# Patient Record
Sex: MALE | Race: BLACK OR AFRICAN AMERICAN
[De-identification: names, ages, dates, MRNs, and addresses within clinical notes are randomized per-mention and may not be internally consistent; named-entity substitution may affect disease eponyms.]

---

## 2018-08-03 ENCOUNTER — HOSPITAL ENCOUNTER (INPATIENT)
Dept: HOSPITAL 84 - D.M2 | Age: 68
LOS: 4 days | Discharge: HOME | DRG: 246 | End: 2018-08-07
Attending: INTERNAL MEDICINE | Admitting: INTERNAL MEDICINE
Payer: MEDICARE

## 2018-08-03 VITALS
BODY MASS INDEX: 23.27 KG/M2 | WEIGHT: 162.54 LBS | WEIGHT: 162.54 LBS | HEIGHT: 70 IN | BODY MASS INDEX: 23.27 KG/M2 | HEIGHT: 70 IN

## 2018-08-03 VITALS — SYSTOLIC BLOOD PRESSURE: 176 MMHG | DIASTOLIC BLOOD PRESSURE: 92 MMHG

## 2018-08-03 VITALS — DIASTOLIC BLOOD PRESSURE: 68 MMHG | SYSTOLIC BLOOD PRESSURE: 118 MMHG

## 2018-08-03 VITALS — SYSTOLIC BLOOD PRESSURE: 126 MMHG | DIASTOLIC BLOOD PRESSURE: 58 MMHG

## 2018-08-03 VITALS — SYSTOLIC BLOOD PRESSURE: 166 MMHG | DIASTOLIC BLOOD PRESSURE: 84 MMHG

## 2018-08-03 DIAGNOSIS — N18.6: ICD-10-CM

## 2018-08-03 DIAGNOSIS — E87.5: ICD-10-CM

## 2018-08-03 DIAGNOSIS — I12.0: ICD-10-CM

## 2018-08-03 DIAGNOSIS — E78.5: ICD-10-CM

## 2018-08-03 DIAGNOSIS — Z99.2: ICD-10-CM

## 2018-08-03 DIAGNOSIS — I25.119: Primary | ICD-10-CM

## 2018-08-03 DIAGNOSIS — I70.213: ICD-10-CM

## 2018-08-03 DIAGNOSIS — Z87.891: ICD-10-CM

## 2018-08-03 DIAGNOSIS — Z95.1: ICD-10-CM

## 2018-08-03 DIAGNOSIS — I70.92: ICD-10-CM

## 2018-08-03 LAB
ALBUMIN SERPL-MCNC: 3.2 G/DL (ref 3.4–5)
ALP SERPL-CCNC: 89 U/L (ref 46–116)
ALT SERPL-CCNC: 33 U/L (ref 10–68)
ANION GAP SERPL CALC-SCNC: 16.1 MMOL/L (ref 8–16)
APTT BLD: 28 SECONDS (ref 22.8–39.4)
BASOPHILS NFR BLD AUTO: 0.4 % (ref 0–2)
BILIRUB SERPL-MCNC: 0.36 MG/DL (ref 0.2–1.3)
BUN SERPL-MCNC: 72 MG/DL (ref 7–18)
CALCIUM SERPL-MCNC: 8.3 MG/DL (ref 8.5–10.1)
CHLORIDE SERPL-SCNC: 100 MMOL/L (ref 98–107)
CHOLEST/HDLC SERPL: 2.2 RATIO (ref 2.3–4.9)
CK MB SERPL-MCNC: 1 U/L (ref 0–3.6)
CK MB SERPL-MCNC: 1 U/L (ref 0–3.6)
CK MB SERPL-MCNC: 1.1 U/L (ref 0–3.6)
CK SERPL-CCNC: 41 UL (ref 21–232)
CK SERPL-CCNC: 52 UL (ref 21–232)
CK SERPL-CCNC: 53 UL (ref 21–232)
CO2 SERPL-SCNC: 27.6 MMOL/L (ref 21–32)
CREAT SERPL-MCNC: 11 MG/DL (ref 0.6–1.3)
EOSINOPHIL NFR BLD: 2.2 % (ref 0–7)
ERYTHROCYTE [DISTWIDTH] IN BLOOD BY AUTOMATED COUNT: 16.4 % (ref 11.5–14.5)
GLOBULIN SER-MCNC: 3.7 G/L
GLUCOSE SERPL-MCNC: 75 MG/DL (ref 74–106)
HCT VFR BLD CALC: 34.8 % (ref 42–54)
HDLC SERPL-MCNC: 58 MG/DL (ref 32–96)
HGB BLD-MCNC: 11.1 G/DL (ref 13.5–17.5)
IMM GRANULOCYTES NFR BLD: 0.7 % (ref 0–5)
INR PPP: 1.19 (ref 0.85–1.17)
LDL-HDL RATIO: 1.1 RATIO (ref 1.5–3.5)
LDLC SERPL-MCNC: 64 MG/DL (ref 0–100)
LYMPHOCYTES NFR BLD AUTO: 24.3 % (ref 15–50)
MAGNESIUM SERPL-MCNC: 1.8 MG/DL (ref 1.8–2.4)
MCH RBC QN AUTO: 29.5 PG (ref 26–34)
MCHC RBC AUTO-ENTMCNC: 31.9 G/DL (ref 31–37)
MCV RBC: 92.6 FL (ref 80–100)
MONOCYTES NFR BLD: 10.2 % (ref 2–11)
NEUTROPHILS NFR BLD AUTO: 62.2 % (ref 40–80)
NT-PROBNP SERPL-MCNC: 4040 PG/ML (ref 0–125)
OSMOLALITY SERPL CALC.SUM OF ELEC: 295 MOSM/KG (ref 275–300)
PLATELET # BLD: 102 10X3/UL (ref 130–400)
PMV BLD AUTO: 11.2 FL (ref 7.4–10.4)
POTASSIUM SERPL-SCNC: 5.7 MMOL/L (ref 3.5–5.1)
PROT SERPL-MCNC: 6.9 G/DL (ref 6.4–8.2)
PROTHROMBIN TIME: 14.7 SECONDS (ref 11.6–15)
RBC # BLD AUTO: 3.76 10X6/UL (ref 4.2–6.1)
SODIUM SERPL-SCNC: 138 MMOL/L (ref 136–145)
TRIGL SERPL-MCNC: 40 MG/DL (ref 30–200)
TROPONIN I SERPL-MCNC: 0.04 NG/ML (ref 0–0.06)
TROPONIN I SERPL-MCNC: 0.05 NG/ML (ref 0–0.06)
TROPONIN I SERPL-MCNC: 0.07 NG/ML (ref 0–0.06)
WBC # BLD AUTO: 5.4 10X3/UL (ref 4.8–10.8)

## 2018-08-03 PROCEDURE — 5A1D70Z PERFORMANCE OF URINARY FILTRATION, INTERMITTENT, LESS THAN 6 HOURS PER DAY: ICD-10-PCS | Performed by: INTERNAL MEDICINE

## 2018-08-04 VITALS — SYSTOLIC BLOOD PRESSURE: 91 MMHG | DIASTOLIC BLOOD PRESSURE: 45 MMHG

## 2018-08-04 VITALS — DIASTOLIC BLOOD PRESSURE: 60 MMHG | SYSTOLIC BLOOD PRESSURE: 111 MMHG

## 2018-08-04 VITALS — SYSTOLIC BLOOD PRESSURE: 85 MMHG | DIASTOLIC BLOOD PRESSURE: 44 MMHG

## 2018-08-04 VITALS — DIASTOLIC BLOOD PRESSURE: 75 MMHG | SYSTOLIC BLOOD PRESSURE: 122 MMHG

## 2018-08-04 VITALS — DIASTOLIC BLOOD PRESSURE: 60 MMHG | SYSTOLIC BLOOD PRESSURE: 112 MMHG

## 2018-08-04 VITALS — SYSTOLIC BLOOD PRESSURE: 116 MMHG | DIASTOLIC BLOOD PRESSURE: 58 MMHG

## 2018-08-04 LAB
ANION GAP SERPL CALC-SCNC: 14.4 MMOL/L (ref 8–16)
BASOPHILS NFR BLD AUTO: 0.2 % (ref 0–2)
BUN SERPL-MCNC: 59 MG/DL (ref 7–18)
CALCIUM SERPL-MCNC: 7.9 MG/DL (ref 8.5–10.1)
CHLORIDE SERPL-SCNC: 100 MMOL/L (ref 98–107)
CO2 SERPL-SCNC: 27.7 MMOL/L (ref 21–32)
CREAT SERPL-MCNC: 9.5 MG/DL (ref 0.6–1.3)
EOSINOPHIL NFR BLD: 2.8 % (ref 0–7)
ERYTHROCYTE [DISTWIDTH] IN BLOOD BY AUTOMATED COUNT: 16.5 % (ref 11.5–14.5)
GLUCOSE SERPL-MCNC: 114 MG/DL (ref 74–106)
HCT VFR BLD CALC: 28.9 % (ref 42–54)
HGB BLD-MCNC: 9.2 G/DL (ref 13.5–17.5)
IMM GRANULOCYTES NFR BLD: 0.4 % (ref 0–5)
LYMPHOCYTES NFR BLD AUTO: 25.3 % (ref 15–50)
MCH RBC QN AUTO: 29 PG (ref 26–34)
MCHC RBC AUTO-ENTMCNC: 31.8 G/DL (ref 31–37)
MCV RBC: 91.2 FL (ref 80–100)
MONOCYTES NFR BLD: 12.3 % (ref 2–11)
NEUTROPHILS NFR BLD AUTO: 59 % (ref 40–80)
OSMOLALITY SERPL CALC.SUM OF ELEC: 291 MOSM/KG (ref 275–300)
PLATELET # BLD: 101 10X3/UL (ref 130–400)
PMV BLD AUTO: 11 FL (ref 7.4–10.4)
POTASSIUM SERPL-SCNC: 5.1 MMOL/L (ref 3.5–5.1)
RBC # BLD AUTO: 3.17 10X6/UL (ref 4.2–6.1)
SODIUM SERPL-SCNC: 137 MMOL/L (ref 136–145)
WBC # BLD AUTO: 5.4 10X3/UL (ref 4.8–10.8)

## 2018-08-05 VITALS — SYSTOLIC BLOOD PRESSURE: 97 MMHG | DIASTOLIC BLOOD PRESSURE: 53 MMHG

## 2018-08-05 VITALS — DIASTOLIC BLOOD PRESSURE: 57 MMHG | SYSTOLIC BLOOD PRESSURE: 99 MMHG

## 2018-08-05 VITALS — SYSTOLIC BLOOD PRESSURE: 139 MMHG | DIASTOLIC BLOOD PRESSURE: 69 MMHG

## 2018-08-05 VITALS — SYSTOLIC BLOOD PRESSURE: 104 MMHG | DIASTOLIC BLOOD PRESSURE: 57 MMHG

## 2018-08-05 VITALS — SYSTOLIC BLOOD PRESSURE: 94 MMHG | DIASTOLIC BLOOD PRESSURE: 56 MMHG

## 2018-08-05 VITALS — SYSTOLIC BLOOD PRESSURE: 95 MMHG | DIASTOLIC BLOOD PRESSURE: 49 MMHG

## 2018-08-05 LAB
ANION GAP SERPL CALC-SCNC: 16.3 MMOL/L (ref 8–16)
BASOPHILS NFR BLD AUTO: 0.2 % (ref 0–2)
BUN SERPL-MCNC: 77 MG/DL (ref 7–18)
CALCIUM SERPL-MCNC: 7.6 MG/DL (ref 8.5–10.1)
CHLORIDE SERPL-SCNC: 98 MMOL/L (ref 98–107)
CO2 SERPL-SCNC: 26.1 MMOL/L (ref 21–32)
CREAT SERPL-MCNC: 11.5 MG/DL (ref 0.6–1.3)
EOSINOPHIL NFR BLD: 2.2 % (ref 0–7)
ERYTHROCYTE [DISTWIDTH] IN BLOOD BY AUTOMATED COUNT: 16.7 % (ref 11.5–14.5)
GLUCOSE SERPL-MCNC: 107 MG/DL (ref 74–106)
HCT VFR BLD CALC: 27.9 % (ref 42–54)
HGB BLD-MCNC: 8.9 G/DL (ref 13.5–17.5)
IMM GRANULOCYTES NFR BLD: 0.4 % (ref 0–5)
LYMPHOCYTES NFR BLD AUTO: 19.2 % (ref 15–50)
MCH RBC QN AUTO: 28.9 PG (ref 26–34)
MCHC RBC AUTO-ENTMCNC: 31.9 G/DL (ref 31–37)
MCV RBC: 90.6 FL (ref 80–100)
MONOCYTES NFR BLD: 14.9 % (ref 2–11)
NEUTROPHILS NFR BLD AUTO: 63.1 % (ref 40–80)
OSMOLALITY SERPL CALC.SUM OF ELEC: 292 MOSM/KG (ref 275–300)
PLATELET # BLD: 114 10X3/UL (ref 130–400)
PMV BLD AUTO: 11.5 FL (ref 7.4–10.4)
POTASSIUM SERPL-SCNC: 5.4 MMOL/L (ref 3.5–5.1)
RBC # BLD AUTO: 3.08 10X6/UL (ref 4.2–6.1)
SODIUM SERPL-SCNC: 135 MMOL/L (ref 136–145)
WBC # BLD AUTO: 5.1 10X3/UL (ref 4.8–10.8)

## 2018-08-06 VITALS — DIASTOLIC BLOOD PRESSURE: 74 MMHG | SYSTOLIC BLOOD PRESSURE: 128 MMHG

## 2018-08-06 VITALS — DIASTOLIC BLOOD PRESSURE: 59 MMHG | SYSTOLIC BLOOD PRESSURE: 103 MMHG

## 2018-08-06 VITALS — DIASTOLIC BLOOD PRESSURE: 65 MMHG | SYSTOLIC BLOOD PRESSURE: 118 MMHG

## 2018-08-06 LAB
ANION GAP SERPL CALC-SCNC: 17.3 MMOL/L (ref 8–16)
BASOPHILS NFR BLD AUTO: 0.2 % (ref 0–2)
BUN SERPL-MCNC: 88 MG/DL (ref 7–18)
CALCIUM SERPL-MCNC: 7.6 MG/DL (ref 8.5–10.1)
CHLORIDE SERPL-SCNC: 100 MMOL/L (ref 98–107)
CO2 SERPL-SCNC: 26.2 MMOL/L (ref 21–32)
CREAT SERPL-MCNC: 13.5 MG/DL (ref 0.6–1.3)
EOSINOPHIL NFR BLD: 1.6 % (ref 0–7)
ERYTHROCYTE [DISTWIDTH] IN BLOOD BY AUTOMATED COUNT: 16.7 % (ref 11.5–14.5)
GLUCOSE SERPL-MCNC: 86 MG/DL (ref 74–106)
HCT VFR BLD CALC: 26.8 % (ref 42–54)
HGB BLD-MCNC: 8.6 G/DL (ref 13.5–17.5)
IMM GRANULOCYTES NFR BLD: 0.4 % (ref 0–5)
LYMPHOCYTES NFR BLD AUTO: 26.2 % (ref 15–50)
MCH RBC QN AUTO: 28.8 PG (ref 26–34)
MCHC RBC AUTO-ENTMCNC: 32.1 G/DL (ref 31–37)
MCV RBC: 89.6 FL (ref 80–100)
MONOCYTES NFR BLD: 14.8 % (ref 2–11)
NEUTROPHILS NFR BLD AUTO: 56.8 % (ref 40–80)
OSMOLALITY SERPL CALC.SUM OF ELEC: 301 MOSM/KG (ref 275–300)
PLATELET # BLD: 110 10X3/UL (ref 130–400)
PMV BLD AUTO: 12.1 FL (ref 7.4–10.4)
POTASSIUM SERPL-SCNC: 5.5 MMOL/L (ref 3.5–5.1)
RBC # BLD AUTO: 2.99 10X6/UL (ref 4.2–6.1)
SODIUM SERPL-SCNC: 138 MMOL/L (ref 136–145)
WBC # BLD AUTO: 5.2 10X3/UL (ref 4.8–10.8)

## 2018-08-06 PROCEDURE — B2151ZZ FLUOROSCOPY OF LEFT HEART USING LOW OSMOLAR CONTRAST: ICD-10-PCS | Performed by: INTERNAL MEDICINE

## 2018-08-06 PROCEDURE — B2111ZZ FLUOROSCOPY OF MULTIPLE CORONARY ARTERIES USING LOW OSMOLAR CONTRAST: ICD-10-PCS | Performed by: INTERNAL MEDICINE

## 2018-08-06 PROCEDURE — 4A023N7 MEASUREMENT OF CARDIAC SAMPLING AND PRESSURE, LEFT HEART, PERCUTANEOUS APPROACH: ICD-10-PCS | Performed by: INTERNAL MEDICINE

## 2018-08-06 PROCEDURE — B2121ZZ FLUOROSCOPY OF SINGLE CORONARY ARTERY BYPASS GRAFT USING LOW OSMOLAR CONTRAST: ICD-10-PCS | Performed by: INTERNAL MEDICINE

## 2018-08-06 PROCEDURE — B2181ZZ FLUOROSCOPY OF LEFT INTERNAL MAMMARY BYPASS GRAFT USING LOW OSMOLAR CONTRAST: ICD-10-PCS | Performed by: INTERNAL MEDICINE

## 2018-08-06 PROCEDURE — 027034Z DILATION OF CORONARY ARTERY, ONE ARTERY WITH DRUG-ELUTING INTRALUMINAL DEVICE, PERCUTANEOUS APPROACH: ICD-10-PCS | Performed by: INTERNAL MEDICINE

## 2018-08-07 VITALS — SYSTOLIC BLOOD PRESSURE: 113 MMHG | DIASTOLIC BLOOD PRESSURE: 57 MMHG

## 2018-08-07 VITALS — SYSTOLIC BLOOD PRESSURE: 104 MMHG | DIASTOLIC BLOOD PRESSURE: 57 MMHG

## 2018-08-07 VITALS — SYSTOLIC BLOOD PRESSURE: 100 MMHG | DIASTOLIC BLOOD PRESSURE: 54 MMHG

## 2018-08-07 LAB
ANION GAP SERPL CALC-SCNC: 15.8 MMOL/L (ref 8–16)
BASOPHILS NFR BLD AUTO: 0 % (ref 0–2)
BUN SERPL-MCNC: 57 MG/DL (ref 7–18)
CALCIUM SERPL-MCNC: 7.6 MG/DL (ref 8.5–10.1)
CHLORIDE SERPL-SCNC: 99 MMOL/L (ref 98–107)
CO2 SERPL-SCNC: 27.3 MMOL/L (ref 21–32)
CREAT SERPL-MCNC: 10 MG/DL (ref 0.6–1.3)
EOSINOPHIL NFR BLD: 1.5 % (ref 0–7)
ERYTHROCYTE [DISTWIDTH] IN BLOOD BY AUTOMATED COUNT: 17 % (ref 11.5–14.5)
GLUCOSE SERPL-MCNC: 86 MG/DL (ref 74–106)
HCT VFR BLD CALC: 27.1 % (ref 42–54)
HGB BLD-MCNC: 8.7 G/DL (ref 13.5–17.5)
IMM GRANULOCYTES NFR BLD: 0.4 % (ref 0–5)
LYMPHOCYTES NFR BLD AUTO: 20.2 % (ref 15–50)
MCH RBC QN AUTO: 28.9 PG (ref 26–34)
MCHC RBC AUTO-ENTMCNC: 32.1 G/DL (ref 31–37)
MCV RBC: 90 FL (ref 80–100)
MONOCYTES NFR BLD: 17 % (ref 2–11)
NEUTROPHILS NFR BLD AUTO: 60.9 % (ref 40–80)
OSMOLALITY SERPL CALC.SUM OF ELEC: 288 MOSM/KG (ref 275–300)
PLATELET # BLD: 110 10X3/UL (ref 130–400)
PMV BLD AUTO: 11 FL (ref 7.4–10.4)
POTASSIUM SERPL-SCNC: 5.1 MMOL/L (ref 3.5–5.1)
RBC # BLD AUTO: 3.01 10X6/UL (ref 4.2–6.1)
SODIUM SERPL-SCNC: 137 MMOL/L (ref 136–145)
WBC # BLD AUTO: 4.7 10X3/UL (ref 4.8–10.8)

## 2018-08-07 PROCEDURE — 027034Z DILATION OF CORONARY ARTERY, ONE ARTERY WITH DRUG-ELUTING INTRALUMINAL DEVICE, PERCUTANEOUS APPROACH: ICD-10-PCS | Performed by: INTERNAL MEDICINE

## 2018-08-08 ENCOUNTER — HOSPITAL ENCOUNTER (INPATIENT)
Dept: HOSPITAL 84 - D.M2 | Age: 68
LOS: 2 days | Discharge: HOME HEALTH | DRG: 246 | End: 2018-08-10
Attending: INTERNAL MEDICINE | Admitting: INTERNAL MEDICINE
Payer: MEDICARE

## 2018-08-08 VITALS
HEIGHT: 70 IN | HEIGHT: 70 IN | BODY MASS INDEX: 24.62 KG/M2 | WEIGHT: 172 LBS | BODY MASS INDEX: 24.62 KG/M2 | BODY MASS INDEX: 24.62 KG/M2 | BODY MASS INDEX: 24.62 KG/M2 | WEIGHT: 172 LBS

## 2018-08-08 DIAGNOSIS — Z99.2: ICD-10-CM

## 2018-08-08 DIAGNOSIS — E78.5: ICD-10-CM

## 2018-08-08 DIAGNOSIS — I12.0: ICD-10-CM

## 2018-08-08 DIAGNOSIS — N18.6: ICD-10-CM

## 2018-08-08 DIAGNOSIS — I21.4: Primary | ICD-10-CM

## 2018-08-08 DIAGNOSIS — D63.1: ICD-10-CM

## 2018-08-08 DIAGNOSIS — I25.10: ICD-10-CM

## 2018-08-08 LAB — HCV AB S/CO SERPL IA: 0.1 (ref 0–0.9)

## 2018-08-09 VITALS — SYSTOLIC BLOOD PRESSURE: 113 MMHG | DIASTOLIC BLOOD PRESSURE: 54 MMHG

## 2018-08-09 VITALS — SYSTOLIC BLOOD PRESSURE: 113 MMHG | DIASTOLIC BLOOD PRESSURE: 50 MMHG

## 2018-08-09 VITALS — SYSTOLIC BLOOD PRESSURE: 94 MMHG | DIASTOLIC BLOOD PRESSURE: 50 MMHG

## 2018-08-09 VITALS — DIASTOLIC BLOOD PRESSURE: 69 MMHG | SYSTOLIC BLOOD PRESSURE: 129 MMHG

## 2018-08-09 VITALS — SYSTOLIC BLOOD PRESSURE: 114 MMHG | DIASTOLIC BLOOD PRESSURE: 63 MMHG

## 2018-08-09 VITALS — DIASTOLIC BLOOD PRESSURE: 61 MMHG | SYSTOLIC BLOOD PRESSURE: 109 MMHG

## 2018-08-09 LAB
ALBUMIN SERPL-MCNC: 2.4 G/DL (ref 3.4–5)
ALP SERPL-CCNC: 81 U/L (ref 46–116)
ALT SERPL-CCNC: 26 U/L (ref 10–68)
ANION GAP SERPL CALC-SCNC: 13.2 MMOL/L (ref 8–16)
BASOPHILS NFR BLD AUTO: 0 % (ref 0–2)
BILIRUB SERPL-MCNC: 0.36 MG/DL (ref 0.2–1.3)
BUN SERPL-MCNC: 66 MG/DL (ref 7–18)
CALCIUM SERPL-MCNC: 7.1 MG/DL (ref 8.5–10.1)
CHLORIDE SERPL-SCNC: 100 MMOL/L (ref 98–107)
CK MB SERPL-MCNC: 1.3 U/L (ref 0–3.6)
CK MB SERPL-MCNC: 1.5 U/L (ref 0–3.6)
CK MB SERPL-MCNC: 1.6 U/L (ref 0–3.6)
CK SERPL-CCNC: 61 UL (ref 21–232)
CK SERPL-CCNC: 67 UL (ref 21–232)
CK SERPL-CCNC: 72 UL (ref 21–232)
CO2 SERPL-SCNC: 30.4 MMOL/L (ref 21–32)
CREAT SERPL-MCNC: 10.3 MG/DL (ref 0.6–1.3)
EOSINOPHIL NFR BLD: 3.9 % (ref 0–7)
ERYTHROCYTE [DISTWIDTH] IN BLOOD BY AUTOMATED COUNT: 16.9 % (ref 11.5–14.5)
GLOBULIN SER-MCNC: 2.8 G/L
GLUCOSE SERPL-MCNC: 101 MG/DL (ref 74–106)
HCT VFR BLD CALC: 26.4 % (ref 42–54)
HGB BLD-MCNC: 8.4 G/DL (ref 13.5–17.5)
IMM GRANULOCYTES NFR BLD: 0.2 % (ref 0–5)
INR PPP: 1.27 (ref 0.85–1.17)
LYMPHOCYTES NFR BLD AUTO: 35.1 % (ref 15–50)
MCH RBC QN AUTO: 29 PG (ref 26–34)
MCHC RBC AUTO-ENTMCNC: 31.8 G/DL (ref 31–37)
MCV RBC: 91 FL (ref 80–100)
MONOCYTES NFR BLD: 11.3 % (ref 2–11)
NEUTROPHILS NFR BLD AUTO: 49.5 % (ref 40–80)
OSMOLALITY SERPL CALC.SUM OF ELEC: 296 MOSM/KG (ref 275–300)
PLATELET # BLD: 104 10X3/UL (ref 130–400)
PMV BLD AUTO: 11.8 FL (ref 7.4–10.4)
POTASSIUM SERPL-SCNC: 4.6 MMOL/L (ref 3.5–5.1)
PROT SERPL-MCNC: 5.2 G/DL (ref 6.4–8.2)
PROTHROMBIN TIME: 15.5 SECONDS (ref 11.6–15)
RBC # BLD AUTO: 2.9 10X6/UL (ref 4.2–6.1)
SODIUM SERPL-SCNC: 139 MMOL/L (ref 136–145)
TROPONIN I SERPL-MCNC: 2.3 NG/ML (ref 0–0.06)
TROPONIN I SERPL-MCNC: 3.04 NG/ML (ref 0–0.06)
TROPONIN I SERPL-MCNC: 3.66 NG/ML (ref 0–0.06)
WBC # BLD AUTO: 4.4 10X3/UL (ref 4.8–10.8)

## 2018-08-10 VITALS — SYSTOLIC BLOOD PRESSURE: 112 MMHG | DIASTOLIC BLOOD PRESSURE: 52 MMHG

## 2018-08-10 VITALS — SYSTOLIC BLOOD PRESSURE: 120 MMHG | DIASTOLIC BLOOD PRESSURE: 55 MMHG

## 2018-08-10 VITALS — SYSTOLIC BLOOD PRESSURE: 108 MMHG | DIASTOLIC BLOOD PRESSURE: 66 MMHG

## 2018-08-10 VITALS — DIASTOLIC BLOOD PRESSURE: 54 MMHG | SYSTOLIC BLOOD PRESSURE: 117 MMHG

## 2018-08-10 PROCEDURE — 4A023N7 MEASUREMENT OF CARDIAC SAMPLING AND PRESSURE, LEFT HEART, PERCUTANEOUS APPROACH: ICD-10-PCS | Performed by: INTERNAL MEDICINE

## 2018-08-10 PROCEDURE — 027036Z DILATION OF CORONARY ARTERY, ONE ARTERY WITH THREE DRUG-ELUTING INTRALUMINAL DEVICES, PERCUTANEOUS APPROACH: ICD-10-PCS | Performed by: INTERNAL MEDICINE

## 2018-08-10 PROCEDURE — 5A1D70Z PERFORMANCE OF URINARY FILTRATION, INTERMITTENT, LESS THAN 6 HOURS PER DAY: ICD-10-PCS | Performed by: INTERNAL MEDICINE

## 2018-08-10 PROCEDURE — B2111ZZ FLUOROSCOPY OF MULTIPLE CORONARY ARTERIES USING LOW OSMOLAR CONTRAST: ICD-10-PCS | Performed by: INTERNAL MEDICINE

## 2018-09-07 ENCOUNTER — HOSPITAL ENCOUNTER (EMERGENCY)
Dept: HOSPITAL 84 - D.ER | Age: 68
Discharge: HOME | End: 2018-09-07
Payer: MEDICARE

## 2018-09-07 VITALS — DIASTOLIC BLOOD PRESSURE: 83 MMHG | SYSTOLIC BLOOD PRESSURE: 147 MMHG

## 2018-09-07 VITALS
HEIGHT: 70 IN | BODY MASS INDEX: 21.95 KG/M2 | HEIGHT: 70 IN | BODY MASS INDEX: 21.95 KG/M2 | WEIGHT: 153.32 LBS | WEIGHT: 153.32 LBS

## 2018-09-07 DIAGNOSIS — K59.00: ICD-10-CM

## 2018-09-07 DIAGNOSIS — Z99.2: ICD-10-CM

## 2018-09-07 DIAGNOSIS — I12.9: ICD-10-CM

## 2018-09-07 DIAGNOSIS — N18.9: ICD-10-CM

## 2018-09-07 DIAGNOSIS — K29.70: Primary | ICD-10-CM

## 2018-09-07 LAB
ALBUMIN SERPL-MCNC: 3.6 G/DL (ref 3.4–5)
ALP SERPL-CCNC: 85 U/L (ref 46–116)
ALT SERPL-CCNC: 22 U/L (ref 10–68)
ANION GAP SERPL CALC-SCNC: 17.1 MMOL/L (ref 8–16)
BASOPHILS NFR BLD AUTO: 0.2 % (ref 0–2)
BILIRUB SERPL-MCNC: 0.48 MG/DL (ref 0.2–1.3)
BUN SERPL-MCNC: 61 MG/DL (ref 7–18)
CALCIUM SERPL-MCNC: 8.6 MG/DL (ref 8.5–10.1)
CHLORIDE SERPL-SCNC: 97 MMOL/L (ref 98–107)
CO2 SERPL-SCNC: 28.3 MMOL/L (ref 21–32)
CREAT SERPL-MCNC: 11.3 MG/DL (ref 0.6–1.3)
EOSINOPHIL NFR BLD: 0.5 % (ref 0–7)
ERYTHROCYTE [DISTWIDTH] IN BLOOD BY AUTOMATED COUNT: 17.1 % (ref 11.5–14.5)
GLOBULIN SER-MCNC: 4 G/L
GLUCOSE SERPL-MCNC: 96 MG/DL (ref 74–106)
HCT VFR BLD CALC: 34.6 % (ref 42–54)
HGB BLD-MCNC: 11.2 G/DL (ref 13.5–17.5)
IMM GRANULOCYTES NFR BLD: 0.3 % (ref 0–5)
LYMPHOCYTES NFR BLD AUTO: 18.2 % (ref 15–50)
MCH RBC QN AUTO: 30.2 PG (ref 26–34)
MCHC RBC AUTO-ENTMCNC: 32.4 G/DL (ref 31–37)
MCV RBC: 93.3 FL (ref 80–100)
MONOCYTES NFR BLD: 11.1 % (ref 2–11)
NEUTROPHILS NFR BLD AUTO: 69.7 % (ref 40–80)
OSMOLALITY SERPL CALC.SUM OF ELEC: 292 MOSM/KG (ref 275–300)
PLATELET # BLD: 181 10X3/UL (ref 130–400)
PMV BLD AUTO: 11.3 FL (ref 7.4–10.4)
POTASSIUM SERPL-SCNC: 4.4 MMOL/L (ref 3.5–5.1)
PROT SERPL-MCNC: 7.6 G/DL (ref 6.4–8.2)
RBC # BLD AUTO: 3.71 10X6/UL (ref 4.2–6.1)
SODIUM SERPL-SCNC: 138 MMOL/L (ref 136–145)
WBC # BLD AUTO: 6 10X3/UL (ref 4.8–10.8)

## 2018-09-25 ENCOUNTER — HOSPITAL ENCOUNTER (OUTPATIENT)
Dept: HOSPITAL 84 - D.CATH | Age: 68
End: 2018-09-25
Attending: INTERNAL MEDICINE
Payer: MEDICARE

## 2018-09-25 VITALS — WEIGHT: 160.34 LBS | BODY MASS INDEX: 22.95 KG/M2 | HEIGHT: 70 IN

## 2018-09-25 VITALS — SYSTOLIC BLOOD PRESSURE: 129 MMHG | DIASTOLIC BLOOD PRESSURE: 53 MMHG

## 2018-09-25 DIAGNOSIS — I70.211: Primary | ICD-10-CM

## 2018-09-25 LAB
ANION GAP SERPL CALC-SCNC: 17.4 MMOL/L (ref 8–16)
BASOPHILS NFR BLD AUTO: 0 % (ref 0–2)
BUN SERPL-MCNC: 50 MG/DL (ref 7–18)
CALCIUM SERPL-MCNC: 9.1 MG/DL (ref 8.5–10.1)
CHLORIDE SERPL-SCNC: 98 MMOL/L (ref 98–107)
CO2 SERPL-SCNC: 29 MMOL/L (ref 21–32)
CREAT SERPL-MCNC: 10.3 MG/DL (ref 0.6–1.3)
EOSINOPHIL NFR BLD: 1.4 % (ref 0–7)
ERYTHROCYTE [DISTWIDTH] IN BLOOD BY AUTOMATED COUNT: 17 % (ref 11.5–14.5)
GLUCOSE SERPL-MCNC: 95 MG/DL (ref 74–106)
HCT VFR BLD CALC: 39.8 % (ref 42–54)
HGB BLD-MCNC: 12.7 G/DL (ref 13.5–17.5)
IMM GRANULOCYTES NFR BLD: 0.3 % (ref 0–5)
LYMPHOCYTES NFR BLD AUTO: 19.6 % (ref 15–50)
MCH RBC QN AUTO: 30.5 PG (ref 26–34)
MCHC RBC AUTO-ENTMCNC: 31.9 G/DL (ref 31–37)
MCV RBC: 95.7 FL (ref 80–100)
MONOCYTES NFR BLD: 13.6 % (ref 2–11)
NEUTROPHILS NFR BLD AUTO: 65.1 % (ref 40–80)
OSMOLALITY SERPL CALC.SUM OF ELEC: 291 MOSM/KG (ref 275–300)
PLATELET # BLD: 169 10X3/UL (ref 130–400)
PMV BLD AUTO: 11 FL (ref 7.4–10.4)
POTASSIUM SERPL-SCNC: 4.4 MMOL/L (ref 3.5–5.1)
RBC # BLD AUTO: 4.16 10X6/UL (ref 4.2–6.1)
SODIUM SERPL-SCNC: 140 MMOL/L (ref 136–145)
WBC # BLD AUTO: 7.1 10X3/UL (ref 4.8–10.8)

## 2018-10-09 ENCOUNTER — HOSPITAL ENCOUNTER (OUTPATIENT)
Dept: HOSPITAL 84 - D.CATH | Age: 68
Discharge: HOME | End: 2018-10-09
Attending: INTERNAL MEDICINE
Payer: MEDICARE

## 2018-10-09 VITALS
HEIGHT: 70 IN | BODY MASS INDEX: 22.24 KG/M2 | BODY MASS INDEX: 22.24 KG/M2 | WEIGHT: 155.33 LBS | WEIGHT: 155.33 LBS | HEIGHT: 70 IN

## 2018-10-09 VITALS — DIASTOLIC BLOOD PRESSURE: 72 MMHG | SYSTOLIC BLOOD PRESSURE: 129 MMHG

## 2018-10-09 DIAGNOSIS — I70.92: ICD-10-CM

## 2018-10-09 DIAGNOSIS — I70.212: Primary | ICD-10-CM

## 2018-10-09 DIAGNOSIS — Z01.812: ICD-10-CM

## 2018-10-09 LAB
ANION GAP SERPL CALC-SCNC: 17.2 MMOL/L (ref 8–16)
BASOPHILS NFR BLD AUTO: 0.5 % (ref 0–2)
BUN SERPL-MCNC: 40 MG/DL (ref 7–18)
CALCIUM SERPL-MCNC: 8.9 MG/DL (ref 8.5–10.1)
CHLORIDE SERPL-SCNC: 101 MMOL/L (ref 98–107)
CO2 SERPL-SCNC: 27.1 MMOL/L (ref 21–32)
CREAT SERPL-MCNC: 9.5 MG/DL (ref 0.6–1.3)
EOSINOPHIL NFR BLD: 1.7 % (ref 0–7)
ERYTHROCYTE [DISTWIDTH] IN BLOOD BY AUTOMATED COUNT: 17.2 % (ref 11.5–14.5)
GLUCOSE SERPL-MCNC: 71 MG/DL (ref 74–106)
HCT VFR BLD CALC: 44.7 % (ref 42–54)
HGB BLD-MCNC: 13.6 G/DL (ref 13.5–17.5)
IMM GRANULOCYTES NFR BLD: 0.2 % (ref 0–5)
LYMPHOCYTES NFR BLD AUTO: 27 % (ref 15–50)
MCH RBC QN AUTO: 29.9 PG (ref 26–34)
MCHC RBC AUTO-ENTMCNC: 30.4 G/DL (ref 31–37)
MCV RBC: 98.2 FL (ref 80–100)
MONOCYTES NFR BLD: 14 % (ref 2–11)
NEUTROPHILS NFR BLD AUTO: 56.6 % (ref 40–80)
OSMOLALITY SERPL CALC.SUM OF ELEC: 286 MOSM/KG (ref 275–300)
PLATELET # BLD: 132 10X3/UL (ref 130–400)
PMV BLD AUTO: 11.8 FL (ref 7.4–10.4)
POTASSIUM SERPL-SCNC: 5.3 MMOL/L (ref 3.5–5.1)
RBC # BLD AUTO: 4.55 10X6/UL (ref 4.2–6.1)
SODIUM SERPL-SCNC: 140 MMOL/L (ref 136–145)
WBC # BLD AUTO: 4.2 10X3/UL (ref 4.8–10.8)

## 2019-02-03 ENCOUNTER — HOSPITAL ENCOUNTER (INPATIENT)
Dept: HOSPITAL 84 - D.ER | Age: 69
LOS: 6 days | Discharge: HOME HEALTH | DRG: 248 | End: 2019-02-09
Attending: INTERNAL MEDICINE | Admitting: INTERNAL MEDICINE
Payer: MEDICARE

## 2019-02-03 VITALS — SYSTOLIC BLOOD PRESSURE: 164 MMHG | DIASTOLIC BLOOD PRESSURE: 88 MMHG

## 2019-02-03 VITALS
BODY MASS INDEX: 21.03 KG/M2 | HEIGHT: 70 IN | WEIGHT: 146.91 LBS | BODY MASS INDEX: 21.03 KG/M2 | BODY MASS INDEX: 21.03 KG/M2 | HEIGHT: 70 IN | WEIGHT: 146.91 LBS | BODY MASS INDEX: 21.03 KG/M2

## 2019-02-03 DIAGNOSIS — N18.6: ICD-10-CM

## 2019-02-03 DIAGNOSIS — I25.119: ICD-10-CM

## 2019-02-03 DIAGNOSIS — I12.0: ICD-10-CM

## 2019-02-03 DIAGNOSIS — I21.4: Primary | ICD-10-CM

## 2019-02-03 DIAGNOSIS — K22.2: ICD-10-CM

## 2019-02-03 DIAGNOSIS — K44.9: ICD-10-CM

## 2019-02-03 NOTE — NUR
C/O CHEST PAIN LEVEL 8 ON NUMBER SCALE DESCRIBED AS SHARP, SHOOTING. CALLED
AND RECEIVED ORDERS FROM VIRGIL RASMUSSEN TO GIVE MORPHINE 4MG IV Q 4HR PRN
AND ZOFRAN 4MG Q4H IV PRN FOR NAUSEA. TO GIVE HIS B/P MEDICATIONS FOR ELEVATED
B/P.

## 2019-02-03 NOTE — NUR
RECEIVED FROM ER VIA . AMBULATED TO THE BED WITH ASSISTANCE. ALERT/ORIENTED
X3. DENIES CHEST PAIN. IV IN RT FA INTACT SL. LEFT UA FISTULA NOTED WITH GOOD
BRUIT/THRILL. VITAL SIGNS TAKEN SHOWS B/P 164/88. STATED HE HAD TAKEN HIS
MORNING B/P MEDICATIONS BUT NOT THE PM'S. REQUESTED A SNACK, STATED HE HAD
NOTHING TO EAT SINCE NOON. ORIENTED TO THE ROOM AND CALL LIGHT.

## 2019-02-03 NOTE — HEMODYNAMI
PATIENT:BROOK CABELLO                                    MEDICAL RECORD: [id number]
: 46                                            [address]
[id number]                                       ADMISSION DATE: 19
 
 
 Generatedon:201911:10
Patient name: BROOK CABELLO Patient #: [id number] Visit #: [id number] SSN: 
: 1950 Date
of study: 2019
Page: Of
Hemodynamic Procedure Report
****************************
Patient Data
Patient Demographics
Procedure consent was obtained
First Name: BROOK           Gender: Male
Last Name: DESTINEY          : 1950
Patient #: [id number]       Age: 68 year(s)
Visit #: [id number]       Race: Unknown
Accession #:
[id number]
Additional ID: [id number]
Contact details
Address: [address] [place] [address]    Phone: [phone]
State: [place]
City: [place]
Zip code: [address]
Past Medical History
Allergies: No known allergies
Admission
Admission Data
Admission Date: 2/3/2019    Admission Time: 20:57
Room #: 2136
Lab Results
Lab Result Date: 2019   Lab Result Time: 0:00
Biochemistry
Name         Units    Result                Min      Max
BUN          mg/dl    52       --(----)-*   7        18
Creatinine   mg/dl    12.4     --(----)-*   0.6      1.3
CBC
Name         Units    Result                Min      Max
Hemoglobin   g/dl     11.1     *-(----)--   13.5     17.5
Procedure
Procedure Types
Cath Procedure
Diagnostic Procedure
LHC
Coronaries w/Grafts
Sedation Charges
Moderate Sedation up to 15 minutes
PCI Procedure
AMI/SVG/ PTCA or Stent
SVG-BMS/YOCASTA Initial
Procedure Description
Procedure Date
Procedure Date: 2019
Procedure Start Time: 10:36
Procedure End Time: 11:05
Procedure Staff
 
Name                            Function
Osman Galvan MD              Performing Physician
Dario Jacinto RT                  Scrub
Johan Sumner RT               Scrub
Isabell Call RT               Monitor
Heidi Stein RN                Nurse
Procedure Data
Cath Procedure
Fluoroscopy
Diagnostic fluoroscopy      Total fluoroscopy Time: 9.2
time: 9.2 min               min
Diagnostic fluoroscopy      Total fluoroscopy dose: 636
dose: 636 mGy               mGy
Contrast Material
Contrast Material Type                       Amount (ml)
Isovue 300                                   157
Entry Location
Entry     Primary  Successful  Side  Size  Upsize Upsize Entry    Closure Succes
sful  Closure
Location                             (Fr)  1 (Fr) 2 (Fr) Remarks  Device        
      Remarks
Femoral                        Right 5 Fr
vein
Femoral                        Right 5 Fr  6 Fr                   Exoseal
artery                                     Short
Estimated blood loss: 10 ml
Diagnostic catheters
Device Type               Used For           End Catheter
Placement
MULTIPACK JL 4.0 5Fr      Procedure
catheter
MULTIPACK 3DRC 5Fr        Procedure
catheter
DIAGNOSTIC AR1 MOD 5Fr    Procedure
catheter ([id number])
DIAGNOSTIC AL2 5Fr        Procedure
catheter (329531X)
DIAGNOSTIC LCB 5Fr        Procedure
catheter (806773F)
MULTIPACK Pigtail 5 Fr    Procedure
catheter
Procedure Complications
No complications
Procedure Medications
Medication           Administration Route Dosage
0.9% NaCl            I.V.                 100 ml/hr
Oxygen               etCO2 Nasal cannula  2 l/min
Lidocaine 2%         added to field       20
Heparin Flush Bag    added to field       2 bags
(1000units/500ml NS)
Versed               I.V.                 2 mg
Fentanyl             I.V.                 50 mcg
Versed               I.V.                 2 mg
Fentanyl             I.V.                 50 mcg
Heparin Bolus        I.V.                 5000 units
Plavix               P.O.                 75 mg
Hemodynamics
Rest
HGB: 11.1 (g/dl) Heart Rate: 73 (bpm)
Snapshots
 
Pre Cath      Intra         NCS           Post Cath
Vital Signs
Time     Heart  Resp   SPO2 etCO2   NIBP (mmHg)  Rhythm  Pain    Sedation
Rate   (ipm)  (%)  (mmHg)                       Status  Level
(bpm)
10:21:51 71     12     100  26      164/85(108)  NSR     0 (11)  10(A)
, No
pain
10:26:17 68     14     100  29      166/85(105)  NSR     0 (11)  10(A)
, No
pain
10:30:41 70     13     100  29.8    166/92(114)  NSR     0 (11)  10(A)
, No
pain
10:35:03 69     14     100  36.5    158/91(105)  NSR     0 (11)  10(A)
, No
pain
10:39:23 69     11     100  33.4    158/90(105)  NSR     0 (11)  9(A)
, No
pain
10:43:44 72     10     100  30      142/80(102)  NSR     0 (11)  10(A)
, No
pain
10:48:00 78     11     100  30      113/79(104)  NSR     0 (11)  9(A)
, No
pain
10:52:07 73     11     100  25.3    127/77(99)   NSR     0 (11)  9(A)
, No
pain
10:56:19 87     13     100  20.9    131/79(108)  NSR     0 (11)  9(A)
, No
pain
11:00:29 88     11     100  21.6    150/93(124)  NSR     0 (11)  10(A)
, No
pain
11:04:45 96     12     100  39.5    160/109(130) NSR     0 (11)  10(A)
, No
pain
Medications
Time     Medication       Route   Dose  Verified Delivered Reason          Notes
    Effectiveness
by       by
10:25:52 0.9% NaCl        I.V.    100   Osman  Heidi     used for
ml/hr [facility] Stein   procedure
MD LOBATO
10:25:58 Oxygen           etCO2   2     Osman  Heidi     used for
Nasal   l/min Dora  Emil   procedure
cannula       MD       RN
10:26:04 Lidocaine 2%     added   20ml  Osman Eppsory   for local
to      vial  [facility]   anesthetic
field         MD       MD
10:26:10 Heparin Flush    added   2     Osman  Osman   used for
Bag              to      bags  [facility]   procedure
(1000units/500ml field         MD       MD
NS)
10:38:03 Versed           I.V.    2 mg  Osman  Heidi     for sedation
[facility] MD, RN
10:38:09 Fentanyl         I.V.    50    Osman  Heidi     for sedation
mcg   [facility] Stein MD, RN
10:42:59 Versed           I.V.    2 mg  Osman  Heidi     for sedation
[facility] Stein MD, RN
10:43:05 Fentanyl         I.V.    50    Osman  Heidi     for sedation
[facility]   [facility] Prav[facility] Stein MD, RN
10:52:56 Heparin Bolus    I.V.    5000  Osman  Heidi     for             verif
ied
units [facility] Stein   anticoagulation with Dr. MD LOBATO                        [place]
10:55:27 Plavix           P.O.    75 mg Osmna Gordon     for
[facility] Stein   antiplatelet
MD       RN        therapy
Procedure Log
Time     Note
9:52:11  Signed procedure consent form obtained from patient.
9:52:13  Diagnostic Cath status Elective
9:52:14  Heidi Stein RN sent for patient. Start room use.
10:07:49 Patient received from Med II to CCL 1 Alert and
oriented. Tansferred to table in Supine position.
10:07:50 Warm blankets applied, and monica hugger turned on for
patient comfort.
10:07:51 Correct patient and procedure confirmed by team.
10:07:51 ECG and BP/O2 sat monitors applied to patient.
10:08:00 H&P Date Dictated: 2/3/2019 Within 30 days and on
chart..
10:08:01 Pre-procedure instructions explained to patient.
10:08:01 Pre-op teaching completed and patient verbalized
understanding.
10:08:02 Family unavailable.
10:08:04 Patient NPO since Midnight.
10:08:11 Patient allergic to No known allergies
10:08:34 Lab Result : BUN 52 mg/dl
10:08:34 Lab Result : Hemoglobin 11.1 g/dl
10:08:34 Lab Result : Creatinine 12.4 mg/dl
10:18:42 Is the patient allergic to Iodine/contrast media? No.
10:18:44 Is patient on blood thinner?No
10:18:45 Patient diabetic? No.
10:18:49 Previous problem with sedation/anesthesia? No ?
10:18:50 Snore? No
10:18:51 Sleep apnea? No
10:18:52 Deviated septum? No
10:18:52 Opens mouth fully? Yes
10:18:53 Sticks out tongue? Yes
10:18:54 Airway obstruction? No ?
10:18:57 Dentures? No ?
10:19:00 Pre procedure: right dorsailis pedis pulse Doppler
10:19:04 Pre procedure: left dorsailis pedis pulse Doppler
10:19:31 IV started by Heidi Stein RN inright hand with a 22
gauge IV catheter with 0.9% NaCl at KVO.
10:19:32 Lab results completed and on chart.
10:19:37 Right groin area was prepped with chlora-prep and
draped in sterile fashion
10:20:01 Alarms reviewed by R. N.
10:20:02 Sharps counted by scrub and verified by R.N.
10:20:16 Use device set Femoral Dx
10:20:18 ACIST Syringe (21695) opened to sterile field.
10:20:19 Bag Decanter (2002S) opened to sterile field.
10:20:20 ACIST Hand Control (71817) opened to sterile field.
 
10:20:20 ACIST Manifold (63338) opened to sterile field.
10:20:21 Tegaderm 4 x 4 (1626W) opened to sterile field.
10:20:30 SHEATH 5FR Darlington (AGQ466) opened to sterile field.
10:20:31 Medline Cath Pack ([id number]) opened to sterile field.
10:20:31 DIAGNOSTIC WIRE .035 260cm J wire (542873) opened to
sterile field.
10:20:32 DIAGNOSTIC Multipack 5Fr catheter set ([id number]) opened
to sterile field.
10::39 Baseline sample Acquired.
10:20:39 Vital chart was started
10::43 Rhythm: sinus rhythm
10::44 Full Disclosure recording started
10:25:52 0.9% NaCl 100 ml/hr I.V. was administered by Heidi Stein RN; used for procedure;
10:25:58 Oxygen 2 l/min etCO2 Nasal cannula was administered by
Heidi Stein RN; used for procedure;
10:26:04 Lidocaine 2% 20ml vial added to field was administered
by Osman Galvan MD; for local anesthetic;
10:26:10 Heparin Flush Bag (1000units/500ml NS) 2 bags added to
field was administered by Osman Galvan MD; used for
procedure;
10:27:40 IV right hand D/C'd due to infiltration.
10:28:04 DR. GALVAN WILL ACCESS VEIN FOR MEDS.
10:30:38 --------ALL STOP TIME OUT------
10::39 Final Timeout: patient, procedure, and site verified
with staff and physician. All members of the team are
in agreement.
10:30:43 Right groin site verified by team.
10:30:48 Fire Safety Assessment: A--An alcohol-based skin
anteseptic being used preoperatively., C--Open oxygen
or nitrous oxide is being used., D--An ESU, laser, or
fiber-optic light is being used.
10:30:50 Physical assessment completed. ASA score P 2 - A
patient with mild systemic disease as per Osman Galvan MD.
10:30:53 Sedation plan: IV Moderate Sedation Medication:Versed,
Fentanyl
10:36:09 Procedure started.
10:36:13 Local anesthetic to right femoral artery with
Lidocaine 2% by Osman Galvan MD.**INITIAL ACCESS
ONLY**
10:36:24 Zero performed for pressure channel P1
10:36:37 A 5 Fr sheath was inserted into the Right Femoral vein
10:36:49 A 5 Fr sheath was inserted into the Right Femoral
artery
10:37:07 SHEATH 5FR Darlington ([id number]) opened to sterile field.
10:38:03 Versed 2 mg I.V. was administered by Heidi Stein RN;
for sedation;
10:38:09 Fentanyl 50 mcg I.V. was administered by Heidi Stein
RN; for sedation;
10:38:42 A MULTIPACK JL 4.0 5Fr catheter was advanced over the
wire and used for Procedure.
10:39:27 LCA angiography performed.
10:39:30 Catheter removed.
10:40:02 A MULTIPACK 3DRC 5Fr catheter was advanced over the
wire and used for Procedure.
10:40:46 RCA angiography performed.
10:41:58 LIMA to LAD angiography performed.
10:42:59 Versed 2 mg I.V. was administered by Heidi Stein RN;
for sedation;
 
10:43:05 Fentanyl 50 mcg I.V. was administered by Heidi Stein
RN; for sedation;
10:43:31 Catheter removed.
10:43:36 A DIAGNOSTIC AR1 MOD 5Fr catheter ([id number]) was
advanced over the wire and used for Procedure.
10:45:11 UNABLE TO ENGAGE
10:45:12 Catheter removed.
10:45:50 A DIAGNOSTIC AL2 5Fr catheter ([id number]) was advanced
over the wire and used for Procedure.
10:49:08 UNABLE T O ENGAGE
10:49:09 Catheter removed.
10:49:37 A DIAGNOSTIC LCB 5Fr catheter ([id number]) was advanced
over the wire and used for Procedure.
10:49:44 SVG to Diag angiography performed.
10:50:04 Catheter removed.
10:50:37 A MULTIPACK Pigtail 5 Fr catheter was advanced over
the wire and used for Procedure.
10:51:23 CATH REMOVED
10:51:28 INFLATOR Merit BasixCompak (PI0412) opened to sterile
field.
10:51:28 WHISPER 300cm guide wire ([id number]) opened to sterile
field.
10:51:29 SHEATH 6FR Darlington (DFP168) opened to sterile field.
10:51:41 GUIDE 6FR LCB catheter (LA6LCB) opened to sterile
field.
10:51:59 Sheath upsized to a 6 Fr Short.
10:52:56 Heparin Bolus 5000 units I.V. was administered by
Heidi Stein RN; for anticoagulation; verified with
Dr. Cunningham
10:53:11 6 Fr LCB guide catheter was inserted over the wire
10:55:25 WHISPER 300 wire advanced.
10:55:27 Plavix 75 mg P.O. was administered by Heidi Stein
RN; for antiplatelet therapy;
10:56:31 Place stent Inflation Number: 1 A INTEGRITY OTW 3.5 X
30 stent ([id number]) was prepped and advanced across
the Aorta Left -> Mid LAD. The stent was deployed at
14 AMANDA for 0:15 (min:sec).
10:56:53 Stent catheter was removed intact over wire.
10:59:32 Place stent Inflation Number: 2 A INTEGRITY RX 3.5 x
18 stent ([id number]) was prepped and advanced across
the Aorta Left -> Mid LAD. The stent was deployed at
14 AMANDA for 0:15 (min:sec).
11:00:10 Stent catheter was removed intact over wire.
11:00:11 Wire removed.
11:00:11 Guide catheter removed.
11:01:19 Procedure type changed to Cath procedure, Diagnostic
procedure, LHC, Coronaries w/Grafts, Sedation Charges,
Moderate Sedation up to 15 minutes, PCI procedure,
AMI/SVG/ PTCA or Stent, SVG-BMS/YOCASTA Initial
11:01:33 EXOSEAL 6Fr () opened to sterile field.
11:01:44 Sheath removed intact; hemostasis achieved with
Exoseal to the Right Femoral artery.
11:01:47 Procedure ended.(Physican Out)
11:03:02 Contrast amount:Isovue 300 157ml.
11:03:08 Fluoroscopy time 09.20 minutes.
11:03:12 Fluoroscopy dose: 636 mGy
11:03:12 Flurop Dose total: 636
11:03:29 5FR SHEATH VENOUS was sutured in with 2-0 SILK.
11:03:40 Post-op/insertion site Right Femoral artery dressed
using a 4 x 4 and Tegaderm.
 
11:03:43 Post-procedure physical assessment completed. ASA
score P 2 - A patient with mild systemic disease as
per Osman Galvan MD.
11:03:46 Post procedure rhythm: unchanged.
11:03:48 Estimated blood loss: 10 ml
11:03:49 Post procedure instruction explained to
patient.Patient verbalizes understanding.
11:03:50 Patient needs reinforcement of post procedure
teaching.
11:05:17 Procedure and supply charges have been captured,
reviewed, submitted and are correct.
11:05:19 Procedure Complication : No complications
11:05:21 Vital chart was stopped
11:05:22 See physician's report for complete and final results.
11:05:23 Report given to PCU.
11:05:29 Patient transfered to PCU with Bed.
11:05:31 Procedure ended.
11:05:31 Full Disclosure recording stopped
11:05:34 End room use (Document Last)
Intervention Summary
Intervention Notes
Time     ActionType  Lesion and  Equipment    Action#  Pressure  Duration
Attributes  Used
10:56:31 Place stent Aorta Left  INTEGRITY    1        14        00:15
-> Mid LAD  OTW 3.5 X 30
stent
([id number])
10:59:32 Place stent Aorta Left  INTEGRITY RX 2        14        00:15
-> Mid LAD  3.5 x 18
stent
([id number])
Device Usage
Item Name    Manufacture  Quantity  Catalog     Hospital Part    Current Minimal
 Lot# /
Number      Charge   Number  Stock   Stock   Serial#
Code
ACIST        Acist        1         86969       239374   749970  769481  20
Syringe      Medical
(25275)      Systems Inc
Bag Decanter Microtek     1         S       266366   06224   801661  5
(S)      Medical Inc.
ACIST Hand   Acist        1         62131       894342   144647  743324  5
Control      Medical
(27283)      Systems Inc
ACIST        Acist        1         16017       012145   481863  864751  5
Manifold     Medical
(56717)      Systems Inc
Tegaderm 4 x 3M           1         1626W       520680   449502  523554  5
4 (1626W)
SHEATH 5FR   Terumo       2         VBP206      466202   495568  172745  5
Darlington
(KIT502)
Medline Cath Medline      1         [id number]   682034   85360   752765  5
Pack
([id number])
DIAGNOSTIC   St Akira      1         853017      272539   299886  396628  30
WIRE .035
260cm J wire
(864730)
DIAGNOSTIC   Cardinal     1         IA6251      746847   95002   516190  30
 
Multipack    Health
5Fr catheter
set (PA9558)
MULTIPACK JL Cardinal     1                                      020434  5
4.0 5Fr      Health
catheter
MULTIPACK    Cardinal     1                                      965008  5
3DRC 5Fr     Health
catheter
DIAGNOSTIC   Cardinal     1         140400Q     166460   057862  065084  15
AR1 MOD 5Fr  Health
catheter
(362201S)
DIAGNOSTIC   Cardinal     1         480256C     877296   978990  128539  15
AL2 5Fr      Health
catheter
(572182F)
DIAGNOSTIC   Cardinal     1         495589Z     565435   363729  226696  5
LCB 5Fr      Health
catheter
(862685D)
MULTIPACK    Cardinal     1                                      519785  5
Pigtail 5 Fr Health
catheter
INFLATOR     Merit        1         IL6792      146578   340081  776541  15
[facility]        Medical
BasixCompak
(AB6168)
WHISPER      Hall       1         [id number]   177688   766045  692591  5
300cm guide  Vascular
wire
(2158086OF)
SHEATH 6FR   Terumo       1         ZTL372      318476   446835  459615  40
Darlington
(IAO729)
GUIDE 6FR    Medtronic    1         LA6LCB      579522   76060   572882  1
LCB catheter
(LA6LCB)
INTEGRITY    Medtronic    1         [id number]   230454   596986  060750  1      
 [id number]
OTW 3.5 X 30
stent
([id number])
INTEGRITY RX Medtronic    1         [id number]  441839   087085  695083  5      
 [id number]
3.5 x 18
stent
([id number])
EXOSEAL 6Fr  Cardinal     1                595181   041294  613390  10
()      Health
Signature Audit [place]
Stage           Time        Signature      Unsigned
Intra-Procedure 2019    Isabell Call
11:10:43 AM RT(R)
Signatures
Monitor : Isabell Call Signature :
RT                       ______________________________
 
Date : ______________ Time :
______________
 
 
 
 
 
 
 
 
 
 
 
 
 
 
 
 
 
 
 
 
 
 
 
 
 
 
 
 
 
 
 
 
 
 
 
 
 
 
 
 
 
 
 
 
 
 
 
 
 
 
 
 
 
[place] [address] [place] [address]

## 2019-02-04 VITALS — DIASTOLIC BLOOD PRESSURE: 58 MMHG | SYSTOLIC BLOOD PRESSURE: 110 MMHG

## 2019-02-04 VITALS — DIASTOLIC BLOOD PRESSURE: 76 MMHG | SYSTOLIC BLOOD PRESSURE: 131 MMHG

## 2019-02-04 VITALS — SYSTOLIC BLOOD PRESSURE: 164 MMHG | DIASTOLIC BLOOD PRESSURE: 88 MMHG

## 2019-02-04 VITALS — DIASTOLIC BLOOD PRESSURE: 77 MMHG | SYSTOLIC BLOOD PRESSURE: 139 MMHG

## 2019-02-04 VITALS — SYSTOLIC BLOOD PRESSURE: 121 MMHG | DIASTOLIC BLOOD PRESSURE: 85 MMHG

## 2019-02-04 LAB
ALBUMIN SERPL-MCNC: 2.8 G/DL (ref 3.4–5)
ALP SERPL-CCNC: 74 U/L (ref 46–116)
ALT SERPL-CCNC: 24 U/L (ref 10–68)
ANION GAP SERPL CALC-SCNC: 11.4 MMOL/L (ref 8–16)
BASOPHILS NFR BLD AUTO: 0 % (ref 0–2)
BILIRUB SERPL-MCNC: 0.34 MG/DL (ref 0.2–1.3)
BUN SERPL-MCNC: 52 MG/DL (ref 7–18)
CALCIUM SERPL-MCNC: 8.1 MG/DL (ref 8.5–10.1)
CHLORIDE SERPL-SCNC: 96 MMOL/L (ref 98–107)
CO2 SERPL-SCNC: 24.5 MMOL/L (ref 21–32)
CREAT SERPL-MCNC: 12.4 MG/DL (ref 0.6–1.3)
EOSINOPHIL NFR BLD: 2.3 % (ref 0–7)
ERYTHROCYTE [DISTWIDTH] IN BLOOD BY AUTOMATED COUNT: 15.8 % (ref 11.5–14.5)
GLOBULIN SER-MCNC: 3.5 G/L
GLUCOSE SERPL-MCNC: 95 MG/DL (ref 74–106)
HCT VFR BLD CALC: 34.3 % (ref 42–54)
HGB BLD-MCNC: 11.1 G/DL (ref 13.5–17.5)
IMM GRANULOCYTES NFR BLD: 0.3 % (ref 0–5)
LYMPHOCYTES NFR BLD AUTO: 24.9 % (ref 15–50)
MCH RBC QN AUTO: 29.8 PG (ref 26–34)
MCHC RBC AUTO-ENTMCNC: 32.4 G/DL (ref 31–37)
MCV RBC: 92 FL (ref 80–100)
MONOCYTES NFR BLD: 14.2 % (ref 2–11)
NEUTROPHILS NFR BLD AUTO: 58.3 % (ref 40–80)
OSMOLALITY SERPL CALC.SUM OF ELEC: 270 MOSM/KG (ref 275–300)
PLATELET # BLD: 128 10X3/UL (ref 130–400)
PMV BLD AUTO: 11.5 FL (ref 7.4–10.4)
POTASSIUM SERPL-SCNC: 3.9 MMOL/L (ref 3.5–5.1)
PROT SERPL-MCNC: 6.3 G/DL (ref 6.4–8.2)
RBC # BLD AUTO: 3.73 10X6/UL (ref 4.2–6.1)
SODIUM SERPL-SCNC: 128 MMOL/L (ref 136–145)
TROPONIN I SERPL-MCNC: 0.42 NG/ML (ref 0–0.06)
WBC # BLD AUTO: 3.9 10X3/UL (ref 4.8–10.8)

## 2019-02-04 PROCEDURE — B2121ZZ FLUOROSCOPY OF SINGLE CORONARY ARTERY BYPASS GRAFT USING LOW OSMOLAR CONTRAST: ICD-10-PCS | Performed by: INTERNAL MEDICINE

## 2019-02-04 PROCEDURE — B2111ZZ FLUOROSCOPY OF MULTIPLE CORONARY ARTERIES USING LOW OSMOLAR CONTRAST: ICD-10-PCS | Performed by: INTERNAL MEDICINE

## 2019-02-04 PROCEDURE — 4A023N7 MEASUREMENT OF CARDIAC SAMPLING AND PRESSURE, LEFT HEART, PERCUTANEOUS APPROACH: ICD-10-PCS | Performed by: INTERNAL MEDICINE

## 2019-02-04 PROCEDURE — 02703EZ DILATION OF CORONARY ARTERY, ONE ARTERY WITH TWO INTRALUMINAL DEVICES, PERCUTANEOUS APPROACH: ICD-10-PCS | Performed by: INTERNAL MEDICINE

## 2019-02-04 PROCEDURE — B2151ZZ FLUOROSCOPY OF LEFT HEART USING LOW OSMOLAR CONTRAST: ICD-10-PCS | Performed by: INTERNAL MEDICINE

## 2019-02-04 NOTE — NUR
WAS GOING TO CHECK ON PT AND THIS NURSE NOTICED THAT PT WAS SITTING UP EATING
HIS LUNCH, INFORMED PT THAT HE NEEDED TO REMAIN FLAT FOR 2HOURS. RT GROIN
ASSESSED AT THIS TIME NOTICED THAT PT HAD DEVELOPED A HEMATOMA TO RT GROIN.
CATH LAB TEAM NOTIFIED AND KIRT APPLIED FEMSTOP TO RT GROIN, TO REMAIN IN
PLACE FOR 1HR AND PT MUST LAY FLAT FOR 4HRS.PT VERBALIZED UNDERSTANDING, WILL
CONTINUE TO MONITOR PT.

## 2019-02-04 NOTE — NUR
RECEIVED PT BACK TO ROOM 2136 VIA BED, VITAL SIGNS STABLE, PLACED PT ON
FREQUENT VITAL SIGNS. RT GROIN DRESSING CDI WITH PULSE PALPABLE. VENOUS SHEATH
TO RT GROIN NOTED NOW INFUSING NS AT 100CC/HR. PT DROWSY BUT EASILY AROUSES TO
VOICE. INSTRUCTED PT TO REMAIN FLAT FOR 4HOURS. PT DENIES ANY NEEDS AT THIS
TIME. CALL LIGHT IN REACH, NAD NOTED, WILL CONTINUE TO MONITOR.

## 2019-02-04 NOTE — NUR
ADMINISTERED 2MG OF MORPHINE FOR PAIN LEVEL OF 9/10. PT DENIES ANY OTHER NEEDS
AT THIS TIME. NO CHANGES TO RT GROIN.

## 2019-02-04 NOTE — NUR
PRE-OP MEDS GIVEN AT THIS TIME. PT'S RT FA IV NOT WORKING, TRIED TO START IV
X2 STICKS, WAS UNSUCCESSFUL. NOTIFIED CATH LAB AND WAS INFORMED THAT THEY
COULD GET IV IN CATH LAB. UNABLE TO GIVE BENADRYL IV.

## 2019-02-04 NOTE — NUR
NO CHANGES FROM PREVIOUS ASSESSMENT. PT RATES PAIN LEVEL NOW 7/10. DENIES ANY
NEEDS AT THIS TIME. CALL LIGHT IN REACH, NAD NOTED, WILL CONTINUE TO MONITOR.

## 2019-02-04 NOTE — NUR
ALERT/AWAKE DENIES PAIN OR ANY NEEDS. RT GROIN HEART CATH SITE DRSG HAS WITH
SOME DRIED BLOOD. RT GROIN IV WITH NS INFUSING AT 20ML/HR. ORIENTED TO CALL
LIGHT FOR ANY NEEDS.

## 2019-02-05 VITALS — SYSTOLIC BLOOD PRESSURE: 133 MMHG | DIASTOLIC BLOOD PRESSURE: 50 MMHG

## 2019-02-05 VITALS — DIASTOLIC BLOOD PRESSURE: 50 MMHG | SYSTOLIC BLOOD PRESSURE: 99 MMHG

## 2019-02-05 VITALS — DIASTOLIC BLOOD PRESSURE: 70 MMHG | SYSTOLIC BLOOD PRESSURE: 120 MMHG

## 2019-02-05 VITALS — SYSTOLIC BLOOD PRESSURE: 110 MMHG | DIASTOLIC BLOOD PRESSURE: 55 MMHG

## 2019-02-05 VITALS — DIASTOLIC BLOOD PRESSURE: 62 MMHG | SYSTOLIC BLOOD PRESSURE: 111 MMHG

## 2019-02-05 VITALS — DIASTOLIC BLOOD PRESSURE: 67 MMHG | SYSTOLIC BLOOD PRESSURE: 133 MMHG

## 2019-02-05 NOTE — NUR
AM ROUNDS- PT RESTING COMFORTABLY IN BED, EASILY AROUSES TO VOICE. PT A/O X4,
RESP EVEN AND NONLABORED ON RA. RT GROIN BRUISED A LITTLE BUT NO SIGNS OF
BLEEDING OR HEMATOMA. RT GROIN VENOUS SHEATH INFUSING NS AT KVO. PT DENIES ANY
NEEDS AT THIS TIME. CALL LIGHT IN REACH, NAD NOTED, WILL CONTINUE TO MONITOR.

## 2019-02-05 NOTE — NUR
NOTIFIED KATH KUMAR THAT PT IS ASKING WHEN HE WILL GET DIALYISIS, SINCE HE
USUALLY GETS DIALYISIS MWF. KATH KUMAR STATED THAT DOCTOR JENY WILL SEE
PT TODAY.

## 2019-02-05 NOTE — NUR
AM MEDS GIVEN AT THIS TIME. ALSO GAVE 2MG OF MORPHINE FOR PAIN LEVEL OF 8/10.
PT DENIES ANY OTHER NEEDS AT THIS TIME, CALL LIGHT IN REACH, NAD NOTED,W ILL
CONTINUE TO MONITOR.

## 2019-02-05 NOTE — NUR
INITIAL ASSESSMENT COMPLETED - PT A/O X4. PT SITTING UP IN BED, RR EVEN AND
UL. DENIES BATHROOM NEEDS AT THIS TIME. NO C/O PAIN/DISCOMFORT. R GROIN IV
C/D/I. NO FURTHER NEEDS NOTED AT THIS TIME, WCTM AND FOLLOW POC. CL IN REACH,
SR UP X2, BED IN LOWEST POSITION.

## 2019-02-05 NOTE — NUR
PT TRANSFERED BACK TO ROOM, DIALYSIS NURSE NOTIFIED THIS NURSE THAT THEY WERE
UNABLE TO DUE DIALYSIS DUE TO FISTULA NOT WORKING. PT DENIES ANY NEEDS AT THIS
TIME,. CALL LIGHT IN REACH,NAD NOTED, WILL CONTINUE TO MONITOR.

## 2019-02-05 NOTE — NUR
CALLED KATH/MYRON WITH [facility] TO ADVISE HER THIS PATIENT IS ADMITTED TO DR. AGRAWAL BUT NO ONE FROM [facility] HAS SEEN PATIENT WITH ADMIT DATE OF 02/03/19.
VERBAL ACKNOWLEDGEMENT RETURNTED

## 2019-02-06 VITALS — SYSTOLIC BLOOD PRESSURE: 127 MMHG | DIASTOLIC BLOOD PRESSURE: 70 MMHG

## 2019-02-06 VITALS — SYSTOLIC BLOOD PRESSURE: 132 MMHG | DIASTOLIC BLOOD PRESSURE: 79 MMHG

## 2019-02-06 VITALS — DIASTOLIC BLOOD PRESSURE: 71 MMHG | SYSTOLIC BLOOD PRESSURE: 128 MMHG

## 2019-02-06 VITALS — DIASTOLIC BLOOD PRESSURE: 69 MMHG | SYSTOLIC BLOOD PRESSURE: 145 MMHG

## 2019-02-06 LAB
AMYLASE SERPL-CCNC: 144 U/L (ref 25–115)
ANION GAP SERPL CALC-SCNC: 21.9 MMOL/L (ref 8–16)
APTT BLD: 29.9 SECONDS (ref 22.8–39.4)
BASOPHILS NFR BLD AUTO: 0 % (ref 0–2)
BUN SERPL-MCNC: 86 MG/DL (ref 7–18)
CALCIUM SERPL-MCNC: 7.6 MG/DL (ref 8.5–10.1)
CHLORIDE SERPL-SCNC: 95 MMOL/L (ref 98–107)
CO2 SERPL-SCNC: 23 MMOL/L (ref 21–32)
CREAT SERPL-MCNC: 16.8 MG/DL (ref 0.6–1.3)
EOSINOPHIL NFR BLD: 2.8 % (ref 0–7)
ERYTHROCYTE [DISTWIDTH] IN BLOOD BY AUTOMATED COUNT: 15.9 % (ref 11.5–14.5)
GLUCOSE SERPL-MCNC: 94 MG/DL (ref 74–106)
HCT VFR BLD CALC: 33.2 % (ref 42–54)
HGB BLD-MCNC: 10.9 G/DL (ref 13.5–17.5)
IMM GRANULOCYTES NFR BLD: 0.2 % (ref 0–5)
INR PPP: 1.33 (ref 0.85–1.17)
LIPASE SERPL-CCNC: 258 U/L (ref 73–393)
LYMPHOCYTES NFR BLD AUTO: 20.5 % (ref 15–50)
MCH RBC QN AUTO: 29.9 PG (ref 26–34)
MCHC RBC AUTO-ENTMCNC: 32.8 G/DL (ref 31–37)
MCV RBC: 91.2 FL (ref 80–100)
MONOCYTES NFR BLD: 15.1 % (ref 2–11)
NEUTROPHILS NFR BLD AUTO: 61.4 % (ref 40–80)
OSMOLALITY SERPL CALC.SUM OF ELEC: 295 MOSM/KG (ref 275–300)
PLATELET # BLD: 120 10X3/UL (ref 130–400)
PMV BLD AUTO: 10.4 FL (ref 7.4–10.4)
POTASSIUM SERPL-SCNC: 4.9 MMOL/L (ref 3.5–5.1)
PROTHROMBIN TIME: 16 SECONDS (ref 11.6–15)
RBC # BLD AUTO: 3.64 10X6/UL (ref 4.2–6.1)
SODIUM SERPL-SCNC: 135 MMOL/L (ref 136–145)
WBC # BLD AUTO: 4.7 10X3/UL (ref 4.8–10.8)

## 2019-02-06 PROCEDURE — 0DB68ZX EXCISION OF STOMACH, VIA NATURAL OR ARTIFICIAL OPENING ENDOSCOPIC, DIAGNOSTIC: ICD-10-PCS | Performed by: INTERNAL MEDICINE

## 2019-02-06 PROCEDURE — 0D758ZZ DILATION OF ESOPHAGUS, VIA NATURAL OR ARTIFICIAL OPENING ENDOSCOPIC: ICD-10-PCS | Performed by: INTERNAL MEDICINE

## 2019-02-06 NOTE — NUR
Dialysis Coordinator: [facility] MWF. ADELSO HERNANDEZ.

## 2019-02-06 NOTE — NUR
PT FOUND WANDERING IN THE STREETS. THE POLICE WAS NOTIFIED AND THEY CALLED THE
HOSPITAL. I WENT DOWN TO PICK PT BACK FROM THE MAIN ENTRANCE WITH TWO OTHER
NURSES, BACK INTO THE FACILITY AND HIS ROOM. PT APPEARS DISORIENTED, PT HAS
JUST HAD EGD DONE THIS AM. [facility] TO HELP REORIENT PT
ABOUT HIS DIALYSIS DAYS. PT CURRENTLY STABLE AND HAS ACCEPTED TO REMAIN IN THE
HOSPITAL. PT HAD PREVIOUSLY TOOK OUT HIS IV AND HAS REFUSED A NEW IV AT THIS
TIME. PT CURRENTLY 68 SINUS ON TELE. CALLED MONISHA VELAZCO TO NOTIFY HER ABOUT PT.
NO BLEEDING FROM PT IV SITE NOTED, NO S/S OF DISTRESS, PT DENIES NEED FOR PAIN
AT THIS TIME. WILL CTM. CL IN REACH, BED IN LOW.

## 2019-02-06 NOTE — NUR
RECIEVED BEDSIDE REPORT. AM ROUNDS COMPLETED. PT JUST RETURNED FROM EGD WITH
BIOPSY. RECIEVED REPORT DONNELL. STATES SHE REMOVED VENOUS SHEET FROM GROIN
AREA. AM MEDS GIVEN. AM MEALS ORDERED. PT DENIES ANY FURTHER NEEDS AT THIS
TIME. WILL CPOC. CL IN REACH, BED IN LOW. WILL CTM.

## 2019-02-06 NOTE — NUR
PT SITTING UP IN BED WITH EYES CLOSED RR- EVEN AND UNLABORED. POSEY BED ALARM
IN PLACE. PT DENIES ANY NEEDS OR PAIN At THIS TIME. BED LOW CALL LIGHT WITHIN
REACH. WILL CONTINUE TO MONIOR.

## 2019-02-06 NOTE — NUR
PT AWARE OF NPO STATUS - CONENTS TO BE SIGNED IN THE AM. NO NEEDS NOTED AT
THIS TIME. RR EVEN AND UL, NO S/S OF DISTRESS. RESTING QUIETLY IN BED. WILL
CONTINUE TO ASSESS NEEDS. DENIES ANY PAIN/DISCOMFORT AT THIS TIME. CL IN
REACH, SR UP X2, BED IN LOWEST POSITION.

## 2019-02-06 NOTE — NUR
Nutrition Follow Up:
Chart reviewed. Pt scheduled for EGD today. Pt reported that he felt as if
food was getting stuck in his throat.
Diet: Renal Gastric Soft
PO Intake: 39% meal avg
BM: 2/5/19
Labs and meds reviewed
Rec continue renal gastric soft diet with SLP recs for consistencies if
needed.
RD following.

## 2019-02-07 VITALS — SYSTOLIC BLOOD PRESSURE: 145 MMHG | DIASTOLIC BLOOD PRESSURE: 82 MMHG

## 2019-02-07 VITALS — DIASTOLIC BLOOD PRESSURE: 88 MMHG | SYSTOLIC BLOOD PRESSURE: 151 MMHG

## 2019-02-07 VITALS — DIASTOLIC BLOOD PRESSURE: 78 MMHG | SYSTOLIC BLOOD PRESSURE: 134 MMHG

## 2019-02-07 VITALS — DIASTOLIC BLOOD PRESSURE: 67 MMHG | SYSTOLIC BLOOD PRESSURE: 136 MMHG

## 2019-02-07 VITALS — DIASTOLIC BLOOD PRESSURE: 96 MMHG | SYSTOLIC BLOOD PRESSURE: 169 MMHG

## 2019-02-07 LAB
ANION GAP SERPL CALC-SCNC: 30.9 MMOL/L (ref 8–16)
BUN SERPL-MCNC: 94 MG/DL (ref 7–18)
CALCIUM SERPL-MCNC: 8.2 MG/DL (ref 8.5–10.1)
CHLORIDE SERPL-SCNC: 94 MMOL/L (ref 98–107)
CO2 SERPL-SCNC: 16.7 MMOL/L (ref 21–32)
CREAT SERPL-MCNC: 18.2 MG/DL (ref 0.6–1.3)
GLUCOSE SERPL-MCNC: 95 MG/DL (ref 74–106)
OSMOLALITY SERPL CALC.SUM OF ELEC: 300 MOSM/KG (ref 275–300)
POTASSIUM SERPL-SCNC: 5.6 MMOL/L (ref 3.5–5.1)
SODIUM SERPL-SCNC: 136 MMOL/L (ref 136–145)
T4 FREE SERPL-MCNC: 1.15 NG/DL (ref 0.76–1.46)
TSH SERPL-ACNC: 2.94 UIU/ML (ref 0.36–3.74)

## 2019-02-07 NOTE — NUR
PT BACK FROM DIALYSIS BY WHEELCHAIR. MEDS GIVEN. PT CURRENTLY RESTING IN BED.
PT STATES HE FEELS MUCH MORE BETTER AND DENIES ANY NEEDS AT THIS TIME. WILL
CTM.

## 2019-02-07 NOTE — NUR
PT RESTING IN BED WITH EYES CLOSED RR-EVEN AND UNLABORED. BED LOW CALL LIGHT
WITHIN REACH. WILL CONTINUE TO MONITOR.

## 2019-02-07 NOTE — NUR
FOUND PT WONDERING AROUND IN HALLWAY. DIRECTED PT BACK TO ROOM AND IN BED. PT
BED ALARM IN PLACE. PT RR-EVEN AND UNLABORED. PT COMPLAINS OF HEART BURN. BED
LOW CALL LIGHT WITHIN REACH. WILL CONTINUE TO MONITOR.

## 2019-02-07 NOTE — NUR
PT HAVE A NEW ORDER FOR CARAFATE AND PROTONIX. MEDS NOT GIVEN YET. PT IN
DIALYSIS. WILL ADMINISTER WHEN PT RETURN.

## 2019-02-07 NOTE — NUR
AM ROUNDS COMPLETED. VSS, PT STILL CONFUSED, AND HAS BEEN TRYING TO LEAVE THE
FACILTY. PT SCHEDULED FOR DIALYSIS. THIS AM. RR UNLABORED, NO S/S OF DISTRESS.
I HAVE TRIED ALL MORNING TO REORIENT THE PT TO TIME AND PLACE. WILL CTM. CL IN
REACH. BED IN  LOW.

## 2019-02-07 NOTE — NUR
Received patient in bed resting, alert and oriented x 4, left arm fistula
(good bruit and thrill), PIV in Rt AC is SL, Right groin dressing post Cath
procedure is C/D/I with large amount purplish bruising around site.   Soft
when palpated, no signs of hematoma.  On room air, respirations easy and
regular, no signs of distress.  No voiced concerns at this time.

## 2019-02-07 NOTE — NUR
PT FOUND THROWING UP BY LUIS ALFREDO MICHAUD. IV 4MG ZOFRAN GIVEN PER MITCHEL CONNELL.
PT STATES HE FEELS LIKE THROWING UP, BUT NOTHING IS REALLY COMING OUT AT THIS
MOMENT. WILL CTM.

## 2019-02-07 NOTE — NUR
22G IV ESTABLISHED IN PT'S RIGHT HAND. TV FLUSHES. STICKS X2. PT VOMITING
CLEAR BLACK FOUL SMELLING VOMIT. PT COMPLAINS OF "HEART BURN." BED LOW CALL
LIGHT WITHIN REACH. WILL CONTINUE TO MONITOR.

## 2019-02-08 VITALS — DIASTOLIC BLOOD PRESSURE: 78 MMHG | SYSTOLIC BLOOD PRESSURE: 146 MMHG

## 2019-02-08 VITALS — DIASTOLIC BLOOD PRESSURE: 87 MMHG | SYSTOLIC BLOOD PRESSURE: 141 MMHG

## 2019-02-08 VITALS — SYSTOLIC BLOOD PRESSURE: 150 MMHG | DIASTOLIC BLOOD PRESSURE: 79 MMHG

## 2019-02-08 VITALS — DIASTOLIC BLOOD PRESSURE: 53 MMHG | SYSTOLIC BLOOD PRESSURE: 140 MMHG

## 2019-02-08 LAB
ANION GAP SERPL CALC-SCNC: 21.6 MMOL/L (ref 8–16)
BUN SERPL-MCNC: 59 MG/DL (ref 7–18)
CALCIUM SERPL-MCNC: 8.9 MG/DL (ref 8.5–10.1)
CHLORIDE SERPL-SCNC: 97 MMOL/L (ref 98–107)
CO2 SERPL-SCNC: 25.1 MMOL/L (ref 21–32)
CREAT SERPL-MCNC: 13.5 MG/DL (ref 0.6–1.3)
GLUCOSE SERPL-MCNC: 85 MG/DL (ref 74–106)
OSMOLALITY SERPL CALC.SUM OF ELEC: 293 MOSM/KG (ref 275–300)
POTASSIUM SERPL-SCNC: 4.7 MMOL/L (ref 3.5–5.1)
SODIUM SERPL-SCNC: 139 MMOL/L (ref 136–145)

## 2019-02-08 NOTE — MORECARE
CASE MANAGEMENT DISCHARGE SUMMARY
 
 
PATIENT: BROOK CABELLO                       UNIT: [id number]
ACCOUNT#: [id number]                       ADM DATE: 19
AGE: 68     : [birth year]  SEX: M            ROOM/BED: D.2136    
AUTHOR: MARCELLE ASTUDILLO                             PHYSICIAN:                               
 
REFERRING PHYSICIAN: ELIF AGRAWAL MD       
DATE OF SERVICE: 19
Discharge Plan
 
 
Patient Name: BROOK CABELLO
Facility: [facility]:[place]
Encounter #: [id number]
Medical Record #: [id number]
: 1950
Planned Disposition: Home with Home Health
Anticipated Discharge Date: 19
 
Discharge Date: 
Expected LOS: 4
Initial Reviewer: [email or web address]
Initial Review Date: 2019
Generated: 19   6:07 pm 
  
 
 
 
 
 
 
Coverage Notice
 
Reviewer: [id number] Leon Ashby
 
Notice Issued Date-Time: 2019  15:15
Notice Type: Medicare Outpatient Observation Notice
 
Notice Delivered To: Patient
Relationship to Patient: Self
Representative Name: 
 
Delivery Method: HAND - Hand Delivered
Marla Days:
Prior Verbal Notification: 
 
Recipient Understood Notice: Yes
Recipient Signature: Yes
 
Med Rec Note Co-signed by Attending:
 
Coverage Notice Comment:  
Reviewer: [id number] Leon Berg
 
Notice Issued Date-Time: 2019  14:55
Notice Type: Patient Choice Letter
 
Notice Delivered To: Patient
Relationship to Patient: 
Representative Name: 
 
Delivery Method: HAND - Hand Delivered
Marla Days:
Prior Verbal Notification: 
 
Recipient Understood Notice: Yes
Recipient Signature: Yes
Med Rec Note Co-signed by Attending:
 
Coverage Notice Comment:  [facility].
Reviewer: [id number] Leon Berg
 
Notice Issued Date-Time: 2019  14:55
Notice Type: IM Discharge Notice
 
Notice Delivered To: Patient
Relationship to Patient: 
Representative Name: 
 
Delivery Method: HAND - Hand Delivered
Marla Days:
Prior Verbal Notification: 
 
Recipient Understood Notice: Yes
Recipient Signature: Yes
Med Rec Note Co-signed by Attending:
 
Coverage Notice Comment:  
Patient Name: BROOK CABELLO
 
Encounter #: [id number]
Page [id number]
 
 
 
 
 
Electronically Signed by MARCELLE ASTUDILLO on 19 at 1707
 
 
 
 
 
 
**All edits/amendments must be made on the electronic document**
 
DICTATION DATE: 19     : CANDIE  19     
RPT#: [id number]                                DC DATE:        
                                               STATUS: ADM IN  
[facility]
 [place] [address]
***END OF REPORT***

## 2019-02-08 NOTE — CN
PATIENT NAME:BROOK CABELLO                                 MEDICAL RECORD: [id number]
: 46                                              LOCATION:. D.2136
ADMIT DATE: 19                                       ACCOUNT: [id number]
CONSULTING PHYSICIAN:    NELLY CHAPMAN MD          
                                               
REFERRING PHYSICIAN:     ELIF AGRAWAL MD       
 
 
DATE OF CONSULTATION:  2019
 
HISTORY OF PRESENT ILLNESS:  A 68-year-old gentleman with a history of coronary
artery disease, status post coronary artery bypass grafting, history of
recurrent stenting, has history of chronic renal sufficiency, transferred here
from [place] for higher level of care, being found to have positive enzymes. 
Currently pain free.  Reports some effort and tolerance as of late.  We are
asked to see him concerning his cardiovascular status.
 
PAST MEDICAL HISTORY:  Includes;
1.  History of chronic renal insufficiency.
2.  Hypertension.
3.  Dyslipidemia.
 
MEDICATIONS:  Include Flexeril 5 mg p.o. t.i.d., Phenergan 25 b.i.d. p.r.n.,
Plavix 75 every day, atorvastatin 40 every day, hydralazine 25 t.i.d.,
metoprolol 25 every day.
 
SOCIAL HISTORY:  Lives in [place].  Nonsmoker, nondrinker.  No set of exercise
program.  Does try to stay active.
 
PHYSICAL EXAMINATION:
GENERAL:  Pleasant, appears stated age.
VITAL SIGNS:  Blood pressure 139/77, pulse 79 and regular.
HEENT:  Normocephalic, atraumatic.
NECK:  No JVD or bruit.
HEART:  Regular with II/VI systolic ejection murmur.
LUNGS:  Fairly good air excursion.
ABDOMEN:  Soft, nontender.
EXTREMITIES:  Pulses; decreased 1+.  There is no edema.
 
IMPRESSION:  Acute coronary artery syndrome/ non-ST-elevation myocardial
infarction.
 
PLAN:  For angiography, intervention based on above.
 
TRANSINT:[id number] Voice Confirmation ID: [id number] DOCUMENT ID: [id number]
                                           
                                           NELLY CHAPMAN MD          
 
 
 
Electronically Signed by NELLY CHAPMAN on 19 at 1222
CC:                                                             [id number]
DICTATION DATE: 19 0855     :     19 1044      ADM IN  
                                                                              
[place] [address]

## 2019-02-08 NOTE — NUR
PT LYING IN BED WITH EYES CLOSED, RR EVEN AND UNLABORED. BED IN LOW POSITION.
72 SINUS ON TELEMETRY. NO S/S OF DISTRESS NOTED. CALL LIGHT IN REACH. WILL
CTM.

## 2019-02-08 NOTE — MORECARE
CASE MANAGEMENT DISCHARGE SUMMARY
 
 
PATIENT: BROOK CABELLO                       UNIT: [id number]
ACCOUNT#: [id number]                       ADM DATE: 19
AGE: 68     : [birth year]  SEX: M            ROOM/BED: D.2136    
AUTHOR: WILDADOC                             PHYSICIAN:                               
 
REFERRING PHYSICIAN: ELIF AGRAWAL MD       
DATE OF SERVICE: 19
Discharge Plan
 
 
Patient Name: BROOK CABELLO
Facility: [facility]:[place]
Encounter #: [id number]
Medical Record #: [id number]
: 1950
Planned Disposition: Home with Home Health
Anticipated Discharge Date: 19
 
Discharge Date: 
Expected LOS: 4
Initial Reviewer: [email or web address]
Initial Review Date: 2019
Generated: 19   6:28 pm 
Comments
 
DCP- Discharge Planning
 
Updated by [email or web address]: Moises Berg on 19   4:22 pm CT
Patient Name:  BROOK CABELLO   
Encounter No:  [id number]   
:  1950   
Primary Insurance:  MEDICARE A & B  
Anticipated DC Date: 2019   
Planned Disposition:  Home with Home Health  
External Planned Provider: [facility]  
  
  
DISCHARGE PLANNING NOTE:    
CM RECEIVED HOME HEALTH ORDER, MET WITH PT IN ROOM TO DISCUSS DISCHARGE 
PLANNING AND NEEDS. PT REPORTS LIVING AT HOME INDEPENDENTLY AND ALONE, BUT 
HAS BEEN STAYING WITH AND WILL RETURN TO HIS SISTER MERARI'S HOME AT [address]  PHONE [phone].  PT HAS  ROLLING WALKER FROM 
[employer].  PT HAS [facility] AT HOME PRIOR TO ADMISSION 
FOR NURSING AND THERAPY SERVICES.  PT GOES TO OUTPATIENT DIALYSIS IN [place], 
[facility], 1100AM, SISTER DRIVES TO AND FROM UNIT.  CM DISCUSSED AVAILABILITY OF 
HOME HEALTH, REHAB SERVICES AND MEDICAL EQUIPMENT. PT WOULD LIKE HOME HEALTH 
WITH [facility] WHEN HE GETS HOME FOR THERAPY SERVICES. PROVIDER LISTING 
 
PROVIDED TO PT.  CHOICE LETTER FOR [facility] SIGNED.  PT REPORTS HIS SSITER 
KNOWS HE IS LEAVING TOMORROW AND WILL PICK HIM UP FOR DISCHARGE HOME TOMORROW.
 IMPORTANT MESSAGE FROM MEDICARE PROVIDED AND EXPLAINED.  
  
FOR DISCHARGE, FAX DISCHARGE INFORMATION TO [facility]  [facility] AT 
[phone], CALL AND NOTIFY [facility] OF DISCHARGE HOME [phone].  
  
Moises Otis, CASE MANAGEMENT
 DCPIA - Discharge Planning Initial Assessment
 
Updated by [id number]: Moises Berg on 19   5:07 pm
*  Is the patient Alert and Oriented?
Yes
*  How many steps to enter\exit or inside your home?
 
*  PCP
DR. MICHELLE REEVES
*  Pharmacy
[facility]
*  Preadmission Environment
Home with Family
*  ADLs
Independent
*  Equipment
Rolling Walker
*  Other Equipment
[employer] - MEDICAL EQUIPMENT PROVIDER
*  List name and contact numbers for known caregivers / representatives who 
currently or will assist patient after discharge:
MERARI CABELLO, SISTER, [phone] [address], [place]  [address]
 
*  Verbal permission to speak to the caregivers and representatives has been 
obtained from the patient.
Yes
*  Community resources currently utilized
Home Health
*  Please name any agencies selected above.
[facility]
*  Additional services required to return to the preadmission environment?
No
*  Can the patient safely return to the preadmission environment?
Yes
*  Has this patient been hospitalized within the prior 30 days at any 
hospital?
No
 
 
 
 
 
Coverage Notice
 
 
Reviewer: [id number] Leon Ashby
 
Notice Issued Date-Time: 2019  15:15
Notice Type: Medicare Outpatient Observation Notice
 
Notice Delivered To: Patient
Relationship to Patient: Self
Representative Name: 
 
Delivery Method: HAND - Hand Delivered
Marla Days:
Prior Verbal Notification: 
 
Recipient Understood Notice: Yes
Recipient Signature: Yes
Med Rec Note Co-signed by Attending:
 
Coverage Notice Comment:  
Reviewer: [email or web address] Leon Berg
 
Notice Issued Date-Time: 2019  14:55
Notice Type: Patient Choice Letter
 
Notice Delivered To: Patient
Relationship to Patient: 
Representative Name: 
 
Delivery Method: HAND - Hand Delivered
Marla Days:
Prior Verbal Notification: 
 
Recipient Understood Notice: Yes
Recipient Signature: Yes
Med Rec Note Co-signed by Attending:
 
Coverage Notice Comment:  [facility]
Reviewer: [id number] Leon Berg
 
Notice Issued Date-Time: 2019  14:55
Notice Type: IM Discharge Notice
 
Notice Delivered To: Patient
Relationship to Patient: 
Representative Name: 
 
Delivery Method: HAND - Hand Delivered
Marla Days:
Prior Verbal Notification: 
 
Recipient Understood Notice: Yes
Recipient Signature: Yes
Med Rec Note Co-signed by Attending:
 
 
Coverage Notice Comment:  
 
Last DP export: 19   4:19 pm
Patient Name: BROOK CABELLO
Encounter #: [id number]
Page [id number]
 
 
 
 
 
Electronically Signed by DOC MCCM on 19 at 1728
 
 
 
 
 
 
**All edits/amendments must be made on the electronic document**
 
DICTATION DATE: 19     : CANDIE  19     
RPT#: [id number]                                DC DATE:        
                                               STATUS: ADM IN  
[facility]
 [place] [address]
***END OF REPORT***

## 2019-02-08 NOTE — OP
PATIENT NAME:  BROOK CABELLO                             MEDICAL RECORD: [id number]
:[birth year]                                             LOCATION:D.M2     D.2136
                                                         ADMISSION DATE:19
SURGEON:  NELLY CHAPMAN MD          
 
 
DATE OF OPERATION:  2019
 
PROCEDURE:  Left heart catheterization, selective coronary angiography, right
femoral artery approach.
 
CATHETERS:  A 5-Saudi Arabian sheath, 5/4 left and right Logan, 5/4 pig.
 
The procedure was well tolerated.  We proceeded immediately to stenting of
saphenous vein graft to circumflex.
 
FINDINGS:  Left ventriculography not performed.
 
CORONARY ANATOMY:
LEFT MAIN:  Left main fills for a short time, basically totally occluded as is
the native LAD and native circumflex.
RIGHT CORONARY ARTERY:  Totally occluded proximally.
 
BYPASS GRAFTS:
1.  LIMA to LAD is widely patent.  There is a small LAD distally with no
discrete stenosis.
2.  Saphenous vein graft to the circumflex.  This fills a large circ territory
as well as retrograde fashion fills the distal right.  This has a long diffuse
80% stenosis from his ostium to the mid portion of the graft.
 
PLAN:  Intervention momentarily.
 
DESCRIPTION OF PROCEDURE:  A 5-Saudi Arabian sheath was changed for a 5-Saudi Arabian sheath. 
LCB guiding catheter provided excellent guide catheter support followed by 300
cm Whisper wire was placed across the tightly occluded LAD down this portion of
vessel.  Stents deployed were a 3.5 x 30 mm and a 3.5 x 18 mm, both Integrity
non-drug-eluting stent up to 14 atmospheres.  Final angiography shows excellent
resolution with basically resolution of the entire lesion from 80% up to 90%
stenosis with no significant residual.  SINGH flow actually improved distally
with improvement in collateral flow to the distal right as well.  Sheath closed
with ExoSeal device.  The patient was previously on Plavix.  Heparin was used in
the lab.
 
TRANSINT:[id number] Voice Confirmation ID: [id number] DOCUMENT ID: [id number]
                                           
                                           NELLY CHAPMAN MD          
 
 
 
Electronically Signed by NELLY CHAPMAN on 19 at 1222
CC:                                                             [id number]
DICTATION DATE: 19 1116     :     19 1238      ADM IN  
                                                                              
[place] [address]

## 2019-02-08 NOTE — NUR
PATIENT DOES NOT HAVE PASSCODE SET UP. SISTER CALLED ASKING FOR INFORMATION
ABOUT PATIENT. I WENT IN AND ASKED IF I COULD GIVE HER INFO AND PATIENT
STATED, "YES. SHE IS MY CAREGIVER."

## 2019-02-08 NOTE — MORECARE
CASE MANAGEMENT DISCHARGE SUMMARY
 
 
PATIENT: BROOK CABELLO                       UNIT: [id number]
ACCOUNT#: [id number]                       ADM DATE: 19
AGE: 68     : [birth year]  SEX: M            ROOM/BED: D.2136    
AUTHOR: MARCELLE ASTUDILLO                             PHYSICIAN:                               
 
REFERRING PHYSICIAN: ELIF AGRAWAL MD       
DATE OF SERVICE: 19
Discharge Plan
 
 
Patient Name: BROOK CABELLO
Facility: [facility]:[place]
Encounter #: [id number]
Medical Record #: [id number]
: 1950
Planned Disposition: Home with Home Health
Anticipated Discharge Date: 19
 
Discharge Date: 
Expected LOS: 4
Initial Reviewer: [email or web address]
Initial Review Date: 2019
Generated: 19   6:19 pm 
 DCPIA - Discharge Planning Initial Assessment
 
Updated by [id number]: Moises Berg on 19   5:07 pm
*  Is the patient Alert and Oriented?
Yes
*  How many steps to enter\exit or inside your home?
 
*  PCP
DR. MICHELLE REEVES
*  Pharmacy
[facility]
*  Preadmission Environment
Home with Family
*  ADLs
Independent
*  Equipment
Rolling Walker
*  Other Equipment
[employer] - MEDICAL EQUIPMENT PROVIDER
*  List name and contact numbers for known caregivers / representatives who 
currently or will assist patient after discharge:
MERARI CABELLO, SISTER, [phone] [address]  [address]
 
*  Verbal permission to speak to the caregivers and representatives has been 
 
obtained from the patient.
Yes
*  Community resources currently utilized
Home Health
*  Please name any agencies selected above.
[facility]
*  Additional services required to return to the preadmission environment?
No
*  Can the patient safely return to the preadmission environment?
Yes
*  Has this patient been hospitalized within the prior 30 days at any 
hospital?
No
 
External Providers
External Provider: [facility]
 
Next Contact Date: 2019
Service Request Date: 
Service Type: 
Resolution: 
 
Reviewer: 
Comments: 
 
 
 
 
Coverage Notice
 
Reviewer: [id number] Leon Ashby
 
Notice Issued Date-Time: 2019  15:15
Notice Type: Medicare Outpatient Observation Notice
 
Notice Delivered To: Patient
Relationship to Patient: Self
Representative Name: 
 
Delivery Method: HAND - Hand Delivered
Marla Days:
Prior Verbal Notification: 
 
Recipient Understood Notice: Yes
Recipient Signature: Yes
Med Rec Note Co-signed by Attending:
 
Coverage Notice Comment:  
Reviewer: [email or web address] Leon Berg
 
Notice Issued Date-Time: 2019  14:55
Notice Type: Patient Choice Letter
 
 
Notice Delivered To: Patient
Relationship to Patient: 
Representative Name: 
 
Delivery Method: HAND - Hand Delivered
Marla Days:
Prior Verbal Notification: 
 
Recipient Understood Notice: Yes
Recipient Signature: Yes
Med Rec Note Co-signed by Attending:
 
Coverage Notice Comment:  [facility].
Reviewer: [email or web address] Leon Berg
 
Notice Issued Date-Time: 2019  14:55
Notice Type: IM Discharge Notice
 
Notice Delivered To: Patient
Relationship to Patient: 
Representative Name: 
 
Delivery Method: HAND - Hand Delivered
Marla Days:
Prior Verbal Notification: 
 
Recipient Understood Notice: Yes
Recipient Signature: Yes
Med Rec Note Co-signed by Attending:
 
Coverage Notice Comment:  
 
Last DP export: 19   4:07 pm
Patient Name: BROOK CABELLO
 
Encounter #: [id number]
Page [phone]
 
 
 
 
 
Electronically Signed by MARCELLE ASTUDILLO on 19 at 1719
 
 
 
 
 
 
**All edits/amendments must be made on the electronic document**
 
DICTATION DATE: 19     : CANDIE  19     
RPT#: [id number]                                DC DATE:        
                                               STATUS: ADM IN  
[facility]
[address] [place] [address]
***END OF REPORT***

## 2019-02-08 NOTE — EC
PATIENT:BROOK CABELLO                   DATE OF SERVICE: 02/03/19
SEX: M                                  MEDICAL RECORD: [id number]
DATE OF BIRTH: [birth year]                        LOCATION:D.M2      D.213
AGE OF PATIENT: 68                             ADMISSION DATE: 02/05/19
 
REFERRING PHYSICIAN:                               
 
INTERPRETING PHYSICIAN: NELLY CHAPMAN MD          
 
 
 
                             ECHOCARDIOGRAM REPORT
  ECHO CHARGES 4               ECHO COMPLETE                 Date: 02/04/19
 
 
 
CLINICAL DIAGNOSIS: ELEVATED TROPONIN             
 
                         ECHOCARDIOGRAPHIC MEASUREMENTS
      (adult normal given)
   AC root (d.<3.7cm) 3.5  cm   LV Septum d (<1.2 cm> 1.5  cm
      Valve Excursion 0.7  cm     LV Septum (systole) 1.9  cm
Left Atria (s.<4.0cm> 3.0  cm          LVPW d(<1.2cm) 1.4  cm
        RV (d.<2.3cm) 2.3  cm           LVPW (sytole) 1.9  cm
  LV diastole(<5.6CM) 5.4  cm       MV E-F(>70mm/sec)      cm
           LV systole 3.3  cm           LVOT Diameter 2.0  cm
       MV exc.(>10mm)      cm
Est.ejection fraction (50-75%)     %
 
   DOPPLER:
     LVIT      cm/sec A 107  cm/sec E 85.0  cm/sec
       LA      cm/sec      RVSP 30.0 mmHg
     LVOT 78.0 cm/sec   AOP1/2T      m/s
  Asc. Ao 280  cm/sec
     RVOT 64.0 cm/sec
       RA      cm/sec
       PA 77.0 cm/sec
 AV Gradient Peak 32.0 mmHg  AV Mean 17.3 mmHg  AV Area 0.8  cm
 MV Gradient Peak 5.6  mmHg  MV Mean 1.8  mmHg  MV Area      cm
   COMMENTS:                                              
 
 
 Cardiac Sonographer: Antwon PANIAGUAOE            
      Cardiologist: 3          Dr. Cunningham             
             TAPE# PACS           
                                       Pericardial Effusion N                        
 
 
DATE OF SERVICE:  02/05/2019
 
Adequate 2-D echo, color flow and spectral Doppler, and M-Mode.
 
LVH is present.  LV internal dimensions are normal.  Wall motion is normal.  EF
is greater than or equal to 55%.  Aortic valve sclerosis is with mild stenosis
by Doppler interrogation.  Peak gradient of 30 mmHg, putting this in mild AS
range.  Left atrium is normal.  Mitral valve shows no prolapse.  Mild MR. 
Right-sided chamber is grossly normal.  Mild TR.
 
 
 
 
ECHOCARDIOGRAM REPORT                          [id number]    BROOK CABELLO           
 
 
TRANSINT:[id number] Voice Confirmation ID: [id number] DOCUMENT ID: [id number]
                                           
                                           NELLY CHAPMAN MD          
 
 
 
Electronically Signed by NELLY CHAPMAN on 02/08/19 at 1222
 
 
 
 
 
 
 
 
 
 
 
 
 
 
 
 
 
 
 
 
 
 
 
 
 
 
 
 
 
 
 
 
 
 
 
 
 
 
 
CC:                                                             [id number]
DICTATION DATE: 02/05/19 0838     :     02/05/19 0933      ADM IN  
                                                                              
[facility]                                          
[address] [place] [address]

## 2019-02-08 NOTE — NUR
REPORT RECEIVED. EVENING ROUNDS COMPLETED. PT SITTING UP IN BED WITH EYES
OPEN, RR EVEN AND UNLABORED. BED IN LOW POSITION. INTRODUCED SELF TO PT. PT
DENIES FURTHER NEEDS. NO S/S OF DISTRESS. CALL LIGHT IN REACH. WILL CTM.

## 2019-02-08 NOTE — NUR
UPON ASSESSMENT THIS MORNING I NOTED PATIENT HAD NO PIV. PER MANDY, APN WITH
RENAL, NO NEED TO RESTART IV AT THIS TIME.

## 2019-02-09 VITALS — SYSTOLIC BLOOD PRESSURE: 155 MMHG | DIASTOLIC BLOOD PRESSURE: 86 MMHG

## 2019-02-09 VITALS — SYSTOLIC BLOOD PRESSURE: 151 MMHG | DIASTOLIC BLOOD PRESSURE: 82 MMHG

## 2019-02-09 VITALS — SYSTOLIC BLOOD PRESSURE: 149 MMHG | DIASTOLIC BLOOD PRESSURE: 81 MMHG

## 2019-02-09 VITALS — DIASTOLIC BLOOD PRESSURE: 81 MMHG | SYSTOLIC BLOOD PRESSURE: 146 MMHG

## 2019-02-09 LAB
ALBUMIN SERPL-MCNC: 3 G/DL (ref 3.4–5)
ALP SERPL-CCNC: 87 U/L (ref 46–116)
ALT SERPL-CCNC: 22 U/L (ref 10–68)
ANION GAP SERPL CALC-SCNC: 21.2 MMOL/L (ref 8–16)
BASOPHILS NFR BLD AUTO: 0.3 % (ref 0–2)
BILIRUB SERPL-MCNC: 0.34 MG/DL (ref 0.2–1.3)
BUN SERPL-MCNC: 73 MG/DL (ref 7–18)
CALCIUM SERPL-MCNC: 8.4 MG/DL (ref 8.5–10.1)
CHLORIDE SERPL-SCNC: 95 MMOL/L (ref 98–107)
CO2 SERPL-SCNC: 25.3 MMOL/L (ref 21–32)
CREAT SERPL-MCNC: 15.3 MG/DL (ref 0.6–1.3)
EOSINOPHIL NFR BLD: 1.3 % (ref 0–7)
ERYTHROCYTE [DISTWIDTH] IN BLOOD BY AUTOMATED COUNT: 16 % (ref 11.5–14.5)
GLOBULIN SER-MCNC: 3.7 G/L
GLUCOSE SERPL-MCNC: 94 MG/DL (ref 74–106)
HCT VFR BLD CALC: 35.9 % (ref 42–54)
HCV AB S/CO SERPL IA: <0.1 S/CO RAT (ref 0–0.9)
HGB BLD-MCNC: 11.6 G/DL (ref 13.5–17.5)
IMM GRANULOCYTES NFR BLD: 0.3 % (ref 0–5)
LYMPHOCYTES NFR BLD AUTO: 23.3 % (ref 15–50)
MCH RBC QN AUTO: 29.3 PG (ref 26–34)
MCHC RBC AUTO-ENTMCNC: 32.3 G/DL (ref 31–37)
MCV RBC: 90.7 FL (ref 80–100)
MONOCYTES NFR BLD: 17.4 % (ref 2–11)
NEUTROPHILS NFR BLD AUTO: 57.4 % (ref 40–80)
OSMOLALITY SERPL CALC.SUM OF ELEC: 295 MOSM/KG (ref 275–300)
PLATELET # BLD: 159 10X3/UL (ref 130–400)
PMV BLD AUTO: 11.3 FL (ref 7.4–10.4)
POTASSIUM SERPL-SCNC: 4.5 MMOL/L (ref 3.5–5.1)
PROT SERPL-MCNC: 6.7 G/DL (ref 6.4–8.2)
RBC # BLD AUTO: 3.96 10X6/UL (ref 4.2–6.1)
SODIUM SERPL-SCNC: 137 MMOL/L (ref 136–145)
WBC # BLD AUTO: 3.9 10X3/UL (ref 4.8–10.8)

## 2019-02-09 NOTE — NUR
PATIENTS PHARMACY IN [place] CLOSED, DOES NOT REOPEN UNTIL MONDAY. [facility] IN
[place] IS CLOSED AT THIS TIME AND DOES NOT REOPEN UNTIL MONDAY. NEEDED A 24
PHARMACY FOR PATIENT TO  HIS CARAFATE. CALLED PRESCRIPTION TO [facility]
ON [facility] IN [place]. [phone]

## 2019-02-09 NOTE — NUR
Patient refused to continue the gastric emptying scan with approx. 30 mins
left. Stated he was having severe burning in chest. Tried to elevate patient
to see if that would help ease the burning. He stated it did not and wanted
the exam stopped. Tried to convience the patient to continue so he could have
a complete exam. He acknowledged his understanding that if the exam is
terminated, then he would only have a partial study sent to the physician
to read due to being unable to re-start the exam. He still refused to
continue. Once the patient was moved from under the camera and sat up, he
stated he felt much better.

## 2019-02-09 NOTE — NUR
RECIEVED REPORT FROM VILMA REIS, THAT PT REOMOVED HIS IV, AND REFUSED GETTING
ANOTHER ONE. ALSO PT REFUSED HIS TELEMETRY. WILL CTM.

## 2019-02-09 NOTE — NUR
READ DC INSTRUCTION TO PT. PT VERBALIZED UNDERSTANDING. TELE RETURNED TO
TELEMETRY JUVENAL ALVARADO. ALL PT BELONGINGS SENT HOME WITH PT. PT WHEELED OUT OF
THE FACILITY. LANE GAVIRIA BROTHER-DANETTE WILL GIVE PT RIDE HOME. PT
DISCHARGED.

## 2019-02-09 NOTE — NUR
SAMIA FROM IR STATES PT COULD NOT COMPLETE THE PROCEDURE. PT WHEELED BACK TO
HIS ROOM. PT STATES HE HAS BEEN HAVING STOMACH ACHE ALL MORNING. PROTONIX AND
CARAFATE GIVEN ALONGSIDE AM MEDS. WILL CTM. CL IN REACH, BED IN LOW.

## 2019-02-09 NOTE — NUR
PT LYING IN BED WITH EYES OPEN, RR EVEN AND UNLABORED. PT CONTINUES TO REMOVE
TELEMETRY LEADS AFTER EDUCATION PROVIDED. DENIES FURTHER NEEDS, CALL LIGHT IN
REACH. WILL CTM.

## 2019-02-09 NOTE — NUR
RECIEVED BEDSIDE REPORT. AM ROUNDS COMPLETED. PT SITTING UP IN BED. VVS,
AA0X3. RR UNLABORED, NO S/S OF DISTRESS. PT IS READY FOR GASTRIC EMPTYING
SCAN. PT STILL NPO TILL AFTER THE PROCEDURE. PT DENIES ANY NEEDS AT THIS TIME.
WILL CPOC. AM MEDS NOT GIVEN AT THIS TIME, WILL CTM.

## 2019-02-11 NOTE — MORECARE
CASE MANAGEMENT DISCHARGE SUMMARY
 
 
PATIENT: BROOK CABELLO                       UNIT: [id number]
ACCOUNT#: [id number]                       ADM DATE: 19
AGE: 68     : [birth year]  SEX: M            ROOM/BED: D.2131    
AUTHOR: MARCELLE ASTUDILLO                             PHYSICIAN:                               
 
REFERRING PHYSICIAN: ELIF AGRAWAL MD       
DATE OF SERVICE: 19
Discharge Plan
 
 
Patient Name: BROOK CABELLO
Facility: [facility]:[place]
Encounter #: [id number]
Medical Record #: [id number]
: 1950
Planned Disposition: Home with Home Health
Anticipated Discharge Date: 19
 
Discharge Date: 2019
Expected LOS: 4
Initial Reviewer: [id number]
Initial Review Date: 2019
Generated: 19  12:19 pm 
Comments
 
DCP- Discharge Planning
 
Updated by [email or web address]: Moises Berg on 19  10:16 am CT
Patient Name:  BROOK CABELLO   
Encounter No:  [id number]   
:  1950   
Primary Insurance:  MEDICARE A & B  
Anticipated DC Date: 2019   
Planned Disposition:  Home with Home Health  
External Planned Provider: [facility]  
  
  
DCP follow-up note: CM REVIEWED CHART, CM CALLED [facility] TO NOTIFY OF 
DISCHARGE HOME [phone].  [facility] INFORMED CM THAT THEY HAD RECEIVED 
ALL DISCHARGE INFORMATION OVER THE WEEKEND AND THE NURSE IS ADMITTING PT 
TODAY FOR HOME HEALTH.  NO FURTHER NEEDS IDENTIFIED.  
  
  
Moises Berg, CASE MANAGEMENT.  
Moises Berg
DCP- Discharge Planning
 
 
Updated by [id number]: Moises Berg on 19   4:22 pm CT
Patient Name:  BROOK CABELLO   
Encounter No:  [id number]   
:  1950   
Primary Insurance:  MEDICARE A & B  
Anticipated DC Date: 2019   
Planned Disposition:  Home with Home Health  
External Planned Provider: [facility], [place] OFFICE  
  
  
DISCHARGE PLANNING NOTE:    
CM RECEIVED HOME HEALTH ORDER, MET WITH PT IN ROOM TO DISCUSS DISCHARGE 
PLANNING AND NEEDS. PT REPORTS LIVING AT HOME INDEPENDENTLY AND ALONE, BUT 
HAS BEEN STAYING WITH AND WILL RETURN TO HIS SISTER MERARI'S HOME AT [facility] 
[address].  PHONE [phone].  PT HAS  ROLLING WALKER FROM 
[facility].  PT HAS [facility] AT HOME PRIOR TO ADMISSION 
FOR NURSING AND THERAPY SERVICES.  PT GOES TO OUTPATIENT DIALYSIS IN [place], 
[facility], 1100AM, SISTER DRIVES TO AND FROM UNIT.  CM DISCUSSED AVAILABILITY OF 
HOME HEALTH, REHAB SERVICES AND MEDICAL EQUIPMENT. PT WOULD LIKE HOME HEALTH 
WITH [facility] WHEN HE GETS HOME FOR THERAPY SERVICES. PROVIDER LISTING 
PROVIDED TO PT.  CHOICE LETTER FOR [facility] SIGNED.  PT REPORTS HIS SSITER 
KNOWS HE IS LEAVING TOMORROW AND WILL PICK HIM UP FOR DISCHARGE HOME TOMORROW.
 IMPORTANT MESSAGE FROM MEDICARE PROVIDED AND EXPLAINED.  
  
FOR DISCHARGE, FAX DISCHARGE INFORMATION TO [facility]  [facility] AT 
[phone], CALL AND NOTIFY [facility] OF DISCHARGE HOME [phone].  
  
Moises Berg, CASE MANAGEMENT
 DCPIA - Discharge Planning Initial Assessment
 
Updated by [email or web address]: Moises Berg on 19   5:07 pm
*  Is the patient Alert and Oriented?
Yes
*  How many steps to enter\exit or inside your home?
 
*  PCP
DR. MICHELLE REEVES
*  Pharmacy
[facility]
*  Preadmission Environment
Home with Family
*  ADLs
Independent
*  Equipment
Rolling Walker
*  Other Equipment
[facility] - MEDICAL EQUIPMENT PROVIDER
*  List name and contact numbers for known caregivers / representatives who 
currently or will assist patient after discharge:
MERARI CABELLO, , [phone] [address] [address], [place]  [phone]
 
*  Verbal permission to speak to the caregivers and representatives has been 
 
obtained from the patient.
Yes
*  Community resources currently utilized
Home Health
*  Please name any agencies selected above.
[facility]
*  Additional services required to return to the preadmission environment?
No
*  Can the patient safely return to the preadmission environment?
Yes
*  Has this patient been hospitalized within the prior 30 days at any 
hospital?
No
 
 
 
 
 
Coverage Notice
 
Reviewer: [email or web address] - Patria Ashby
 
Notice Issued Date-Time: 2019  15:15
Notice Type: Medicare Outpatient Observation Notice
 
Notice Delivered To: Patient
Relationship to Patient: Self
Representative Name: 
 
Delivery Method: HAND - Hand Delivered
Marla Days:
Prior Verbal Notification: 
 
Recipient Understood Notice: Yes
Recipient Signature: Yes
Med Rec Note Co-signed by Attending:
 
Coverage Notice Comment:  
Reviewer: [email or web address] Leon Berg
 
Notice Issued Date-Time: 2019  14:55
Notice Type: Patient Choice Letter
 
Notice Delivered To: Patient
Relationship to Patient: 
Representative Name: 
 
Delivery Method: HAND - Hand Delivered
Marla Days:
Prior Verbal Notification: 
 
Recipient Understood Notice: Yes
 
Recipient Signature: Yes
Med Rec Note Co-signed by Attending:
 
Coverage Notice Comment:  [facility].
Reviewer: [email or web address] Leon Berg
 
Notice Issued Date-Time: 2019  14:55
Notice Type: IM Discharge Notice
 
Notice Delivered To: Patient
Relationship to Patient: 
Representative Name: 
 
Delivery Method: HAND - Hand Delivered
Marla Days:
Prior Verbal Notification: 
 
Recipient Understood Notice: Yes
Recipient Signature: Yes
Med Rec Note Co-signed by Attending:
 
Coverage Notice Comment:  
 
Last DP export: 19   4:28 pm
Patient Name: BROOK CABELLO
Encounter #: [id number]
Page [id number]
 
 
 
 
 
Electronically Signed by MARCELLE ASTUDILLO on 19 at 1119
 
 
 
 
 
 
**All edits/amendments must be made on the electronic document**
 
DICTATION DATE: 198     : CANDIE  19 1118     
RPT#: [id number]                                DC DATE:19
                                               STATUS: DIS IN  
[facility]
[address] [place] [address]
***END OF REPORT***

## 2019-02-22 ENCOUNTER — HOSPITAL ENCOUNTER (INPATIENT)
Dept: HOSPITAL 84 - D.ER | Age: 69
LOS: 2 days | Discharge: HOME | DRG: 246 | End: 2019-02-24
Attending: INTERNAL MEDICINE | Admitting: INTERNAL MEDICINE
Payer: MEDICARE

## 2019-02-22 VITALS — SYSTOLIC BLOOD PRESSURE: 128 MMHG | DIASTOLIC BLOOD PRESSURE: 69 MMHG

## 2019-02-22 VITALS — SYSTOLIC BLOOD PRESSURE: 141 MMHG | DIASTOLIC BLOOD PRESSURE: 63 MMHG

## 2019-02-22 VITALS
BODY MASS INDEX: 21.66 KG/M2 | HEIGHT: 70 IN | BODY MASS INDEX: 21.66 KG/M2 | HEIGHT: 70 IN | BODY MASS INDEX: 21.66 KG/M2 | WEIGHT: 151.32 LBS | WEIGHT: 151.32 LBS | BODY MASS INDEX: 21.66 KG/M2

## 2019-02-22 VITALS — SYSTOLIC BLOOD PRESSURE: 112 MMHG | DIASTOLIC BLOOD PRESSURE: 57 MMHG

## 2019-02-22 VITALS — DIASTOLIC BLOOD PRESSURE: 98 MMHG | SYSTOLIC BLOOD PRESSURE: 176 MMHG

## 2019-02-22 VITALS — SYSTOLIC BLOOD PRESSURE: 169 MMHG | DIASTOLIC BLOOD PRESSURE: 93 MMHG

## 2019-02-22 VITALS — SYSTOLIC BLOOD PRESSURE: 153 MMHG | DIASTOLIC BLOOD PRESSURE: 80 MMHG

## 2019-02-22 VITALS — SYSTOLIC BLOOD PRESSURE: 122 MMHG | DIASTOLIC BLOOD PRESSURE: 59 MMHG

## 2019-02-22 VITALS — SYSTOLIC BLOOD PRESSURE: 145 MMHG | DIASTOLIC BLOOD PRESSURE: 68 MMHG

## 2019-02-22 VITALS — DIASTOLIC BLOOD PRESSURE: 73 MMHG | SYSTOLIC BLOOD PRESSURE: 148 MMHG

## 2019-02-22 VITALS — SYSTOLIC BLOOD PRESSURE: 115 MMHG | DIASTOLIC BLOOD PRESSURE: 50 MMHG

## 2019-02-22 VITALS — SYSTOLIC BLOOD PRESSURE: 161 MMHG | DIASTOLIC BLOOD PRESSURE: 93 MMHG

## 2019-02-22 VITALS — SYSTOLIC BLOOD PRESSURE: 155 MMHG | DIASTOLIC BLOOD PRESSURE: 88 MMHG

## 2019-02-22 VITALS — SYSTOLIC BLOOD PRESSURE: 137 MMHG | DIASTOLIC BLOOD PRESSURE: 80 MMHG

## 2019-02-22 VITALS — SYSTOLIC BLOOD PRESSURE: 162 MMHG | DIASTOLIC BLOOD PRESSURE: 83 MMHG

## 2019-02-22 VITALS — DIASTOLIC BLOOD PRESSURE: 65 MMHG | SYSTOLIC BLOOD PRESSURE: 133 MMHG

## 2019-02-22 VITALS — SYSTOLIC BLOOD PRESSURE: 144 MMHG | DIASTOLIC BLOOD PRESSURE: 68 MMHG

## 2019-02-22 VITALS — SYSTOLIC BLOOD PRESSURE: 156 MMHG | DIASTOLIC BLOOD PRESSURE: 84 MMHG

## 2019-02-22 VITALS — SYSTOLIC BLOOD PRESSURE: 136 MMHG | DIASTOLIC BLOOD PRESSURE: 62 MMHG

## 2019-02-22 VITALS — SYSTOLIC BLOOD PRESSURE: 157 MMHG | DIASTOLIC BLOOD PRESSURE: 85 MMHG

## 2019-02-22 DIAGNOSIS — I12.0: ICD-10-CM

## 2019-02-22 DIAGNOSIS — I21.4: Primary | ICD-10-CM

## 2019-02-22 DIAGNOSIS — R07.9: ICD-10-CM

## 2019-02-22 DIAGNOSIS — Z99.2: ICD-10-CM

## 2019-02-22 DIAGNOSIS — N18.6: ICD-10-CM

## 2019-02-22 LAB
ALBUMIN SERPL-MCNC: 3.3 G/DL (ref 3.4–5)
ALP SERPL-CCNC: 98 U/L (ref 46–116)
ALT SERPL-CCNC: 19 U/L (ref 10–68)
ANION GAP SERPL CALC-SCNC: 20.2 MMOL/L (ref 8–16)
APTT BLD: 129.6 SECONDS (ref 22.8–39.4)
BASOPHILS NFR BLD AUTO: 0.1 % (ref 0–2)
BILIRUB SERPL-MCNC: 0.54 MG/DL (ref 0.2–1.3)
BUN SERPL-MCNC: 32 MG/DL (ref 7–18)
CALCIUM SERPL-MCNC: 8.4 MG/DL (ref 8.5–10.1)
CHLORIDE SERPL-SCNC: 102 MMOL/L (ref 98–107)
CK MB SERPL-MCNC: 2.8 U/L (ref 0–3.6)
CK MB SERPL-MCNC: 4.1 U/L (ref 0–3.6)
CK SERPL-CCNC: 110 UL (ref 21–232)
CK SERPL-CCNC: 131 UL (ref 21–232)
CO2 SERPL-SCNC: 23.4 MMOL/L (ref 21–32)
CREAT SERPL-MCNC: 9.4 MG/DL (ref 0.6–1.3)
EOSINOPHIL NFR BLD: 0.7 % (ref 0–7)
ERYTHROCYTE [DISTWIDTH] IN BLOOD BY AUTOMATED COUNT: 17 % (ref 11.5–14.5)
GLOBULIN SER-MCNC: 3.5 G/L
GLUCOSE SERPL-MCNC: 78 MG/DL (ref 74–106)
HCT VFR BLD CALC: 35.2 % (ref 42–54)
HGB BLD-MCNC: 10.7 G/DL (ref 13.5–17.5)
IMM GRANULOCYTES NFR BLD: 0.1 % (ref 0–5)
INR PPP: 1.36 (ref 0.85–1.17)
LYMPHOCYTES NFR BLD AUTO: 19.5 % (ref 15–50)
MAGNESIUM SERPL-MCNC: 1.9 MG/DL (ref 1.8–2.4)
MCH RBC QN AUTO: 29.1 PG (ref 26–34)
MCHC RBC AUTO-ENTMCNC: 30.4 G/DL (ref 31–37)
MCV RBC: 95.7 FL (ref 80–100)
MONOCYTES NFR BLD: 7.5 % (ref 2–11)
NEUTROPHILS NFR BLD AUTO: 72.1 % (ref 40–80)
OSMOLALITY SERPL CALC.SUM OF ELEC: 288 MOSM/KG (ref 275–300)
PLATELET # BLD: 135 10X3/UL (ref 130–400)
PMV BLD AUTO: 11.8 FL (ref 7.4–10.4)
POTASSIUM SERPL-SCNC: 3.6 MMOL/L (ref 3.5–5.1)
PROT SERPL-MCNC: 6.8 G/DL (ref 6.4–8.2)
PROTHROMBIN TIME: 16.2 SECONDS (ref 11.6–15)
RBC # BLD AUTO: 3.68 10X6/UL (ref 4.2–6.1)
SODIUM SERPL-SCNC: 142 MMOL/L (ref 136–145)
TROPONIN I SERPL-MCNC: 3.1 NG/ML (ref 0–0.06)
TROPONIN I SERPL-MCNC: 3.7 NG/ML (ref 0–0.06)
WBC # BLD AUTO: 7 10X3/UL (ref 4.8–10.8)

## 2019-02-22 NOTE — NUR
REASSESSMENT COMPLETE, PLEASE SEE FLOW SHEETS FOR DETAILS. NO ACUTE CHANGES
FROM PREVIOUS ASSESSMENT TO NOTE. EJ SITE INSPECTED, CDI. BED LOW AND
LOCKED, TITRATING NITRO TO EFFECT, HEMODYNAMICALLY STABLE. WILL CPOC.

## 2019-02-22 NOTE — HEMODYNAMI
PATIENT:BROOK CABELLO                                    MEDICAL RECORD: [id number]
: [birth year]                                            LOCATION:[place] [address]
ACCT# [id number]                                       ADMISSION DATE: 19
 
 
 Generatedon:201913:08
Patient name: BROOK CABELLO Patient #: [id number] Visit #: [id number] SSN: 
: 1950 Date of study:
2019
Page: Of
Hemodynamic Procedure Report
****************************
Patient Data
Patient Demographics
Procedure consent was obtained
First Name: BROOK           Gender: Male
Last Name: DESTINEY          : 1950
Patient #: [id number]       Age: 68 year(s)
Visit #: [id number]       Race: Unknown
Accession #:
[id number]
Additional ID: [id number]
Contact details
Address: [address] [place] [address]    Phone: [phone]
State: [place]
City: [place]
Zip code: [address]
Past Medical History
Allergies: No known allergies
Admission
Admission Data
Admission Date: 2019   Admission Time: 17:27
Admit Source: Emergency
department
Room #: [facility]
Procedure
Procedure Types
Cath Procedure
Diagnostic Procedure
LHC
LHC w/Coronaries w/Grafts
Sedation Charges
Moderate Sedation up to 15 minutes
PCI Procedure
AMI/SVG/ PTCA or Stent
SVG-BMS/YOCASTA Initial
Procedure Description
Procedure Date
Procedure Date: 2019
Procedure Start Time: 12:42
Procedure End Time: 13:07
Procedure Staff
Name                            Function
Trevor Bhandari MD                Performing Physician
Florentino Ball RT                  Monitor
Stacey Christy RT             Scrub
Gutierrez Lee RN              Nurse
Procedure Data
Cath Procedure
 
Fluoroscopy
Diagnostic fluoroscopy      Total fluoroscopy Time: 9
time: 9 min                 min
Diagnostic fluoroscopy      Total fluoroscopy dose:
dose: 306.63 mGy            306.63 mGy
Contrast Material
Contrast Material Type                       Amount (ml)
Isovue 300                                   104
Entry Location
Entry     Primary  Successful  Side  Size  Upsize Upsize Entry    Closure Succes
sful  Closure
Location                             (Fr)  1 (Fr) 2 (Fr) Remarks  Device        
      Remarks
Femoral                        Left  6 Fr                         Exoseal
artery                               Short
Estimated blood loss: 10 ml
Diagnostic catheters
Device Type               Used For           End Catheter
Placement
MULTIPACK Pigtail 5 Fr    Procedure
catheter
MULTIPACK JL 4.0 5Fr      Procedure
catheter
DIAGNOSTIC JL 6 5Fr       Procedure
catheter (297143U)
DIAGNOSTIC JL 5 5Fr       Procedure
catheter (187314Y)
DIAGNOSTIC IM 5Fr         Procedure
catheter (428820F)
Procedure Complications
No complications
Procedure Medications
Medication           Administration Route Dosage
0.9% NaCl            I.V.                 10 ml/hr
Oxygen               etCO2 Nasal cannula  2 l/min
Lidocaine 2%                              20
Heparin Flush Bag    added to field       2 bags
(1000units/500ml NS)
Heparin Drip                              800 units/hr
(33096jhdqk/250 D5W)
Nitro (50mg/250 D5W)                      33 mcg/min
Fentanyl             I.V.                 50 mcg
Nitro (50mg/250 D5W)                      33 mcg/min
Fentanyl             I.V.                 50 mcg
Hemodynamics
Rest
Heart Rate: 58 (bpm)
Snapshots
Pre Cath      Intra         NCS           Post Cath
Vital Signs
Time     Heart  Resp   SPO2 etCO2   NIBP       Rhythm  Pain    Sedation
Rate   (ipm)  (%)  (mmHg)  (mmHg)             Status  Level
(bpm)
12:29:36 63     20     100  0       146/66(99) NSR     0 (11)  10(A)
, No
pain
12:34:01 58     12     100  0       142/58(81) NSR     0 (11)  10(A)
, No
pain
12:38:17 58     12     100  0       116/51(70) NSR     0 (11)  10(A)
 
, No
pain
12:42:35 58     11     100  0       112/43(76) NSR     0 (11)  10(A)
, No
pain
12:46:53 59     11     100  0       112/32(71) NSR     0 (11)  10(A)
, No
pain
12:51:07 59     11     100  0       127/49(78) NSR     0 (11)  10(A)
, No
pain
12:56:20 61     11     100  0       122/59(80) NSR     0 (11)  10(A)
, No
pain
13:00:34 61     13     100  0       123/70(80) NSR     0 (11)  10(A)
, No
pain
13:04:48 60     12     100  0       139/63(81) NSR     0 (11)  10(A)
, No
pain
Medications
Time     Medication       Route           Dose     Verified Delivered Reason    
      Notes  Effectiveness
by       by
12:34:11 0.9% NaCl        I.V.            10 ml/hr Gutierrez Whitley   Per physic
ofelia Lee
RN       RN
12:34:26 Oxygen           etCO2 Nasal     2 l/min  Gutierrez  Gutierrez   for low 02
 sats
cannula                  Rosa Lee
RN       RN
12:34:37 Lidocaine 2%                     20ml     Gutierrez  Gutierrez   for local
vial     Lordarrel Lee   anesthetic
RN       RN
12:34:48 Heparin Flush    added to field  2 bags   Gutierrez  Gutierrez   used for
Bag                                       Lorigan  Lordarrel   procedure
(1000units/500ml                          RN       RN
NS)
12:35:30 Heparin Drip     I.V. drip       800      Gutierrez  Gutierrez   for
(12225ngoui/250  infusing and    units/hr Lorigan  Lorigan   anticoagulation
D5W)             turned off               RN       RN
12:35:56 Nitro (50mg/250  I.V. drip       33       Gutierrez  Gutierrez   for
D5W)             infusing        mcg/min  Lorigan  Lordarrel   vasodilation
RN       RN
12:39:04 Fentanyl         I.V.            50 mcg   Gutierrez  Gutierrez   for sedati
on
Lordarrel Lee RN       RN
12:43:10 Nitro (50mg/250  I.V. drip       33       Gutierrez  Gutierrez   for
D5W)             infusing(turned mcg/min  Lorigan  Lorigan   vasodilation
off)                     RN       RN
13:00:52 Fentanyl         I.V.            50 mcg   Gutierrez  Gutierrez   for sedati
on
Rosa Lee RN       RN
Procedure Log
Time     Note
12:14:33 Informed consent obtained and on chart
12:14:38 Admit Source: Emergency department
 
12:14:54 Diagnostic Cath status Elective
12:14:56 Gutierrez Lee RN sent for patient. Start room use.
12:14:56 Time tracking: Regular hours (M-F 7:00 - 5:00)
12:14:59 Plan of Care:Hemodynamics will remain stable., Cardiac
rhythm will remain stable., Comfort level will be
maintained., Respiratory function will remain
adequate., Patient/ family verbilizes understanding of
procedure., Procedure tolerated without complication.,
Recovers from procedure without complications..
12:22:29 Patient received from CVICU to CCL 1 Alert and
oriented. Tansferred to table in Supine position.
12:22:30 Warm blankets applied, and monica hugger turned on for
patient comfort.
12:22:31 Correct patient and procedure confirmed by team.
12:22:32 ECG and BP/O2 sat monitors applied to patient.
12:22:33 Full Disclosure recording started
12:28:32 Vital chart was started
12:28:39 Rhythm: sinus bradycardia
12:34:08 H&P Date Dictated: 2019 Within 30 days and on
chart..
12:34:09 Pre-procedure instructions explained to patient.
12:34:10 Pre-op teaching completed and patient verbalized
understanding.
12:34:11 0.9% NaCl 10 ml/hr I.V. was administered by Gutierrez Lee RN; Per physician;
12:34:12 Family unavailable.
12:34:13 Patient NPO since Midnight.
12:34:18 Patient allergic to No known allergies
12:34:20 Is the patient allergic to Iodine/contrast media? No.
12:34:21 Is patient on blood thinner?Yes
12:34:26 Oxygen 2 l/min etCO2 Nasal cannula was administered by
Gutierrez Lee RN; for low 02 sats;
12:34:27 **ACC** The patient was administered the following
blood thiners within the last 24 hours: **ACC**Plavix,
**ACC**Heparin
12:34:29 Patient diabetic? No.
12:34:31 Previous problem with sedation/anesthesia? No ?
12:34:32 Snore? No
12:34:33 Sleep apnea? No
12:34:34 Deviated septum? No
12:34:34 Opens mouth fully? Yes
12:34:35 Sticks out tongue? Yes
12:34:36 Airway obstruction? No ?
12:34:37 Lidocaine 2% 20ml vial was administered by Gutierrez Lee RN; for local anesthetic;
12:34:41 Dentures? Yes out
12:34:43 Pre procedure: right dorsailis pedis pulse 2+ Normal;
easily identifiable; not easily obliterated
12:34:45 Pre procedure: left dorsailis pedis pulse 2+ Normal;
easily identifiable; not easily obliterated
12:34:47 Patient pain scale 0/10 ?.
12:34:48 Heparin Flush Bag (1000units/500ml NS) 2 bags added to
field was administered by Gutierrez Lee RN; used for
procedure;
12:34:53 IV patent on arrival in right EJ with 0.9% NaCl at
[facility].
12:35:04 Lab results completed and on chart.
12:35:06 Left groin area was prepped with chlora-prep and
draped in sterile fashion
12:35:07 Alarms reviewed by RIraj N.
 
12:35:07 Sharps counted by scrub and verified by R.N.
12:35:09 Use device set Femoral Dx
12:35:10 ACIST Syringe (59343) opened to sterile field.
12:35:11 Bag Decanter (2002S) opened to sterile field.
12:35:11 Medline Cath Pack ([id number]) opened to sterile field.
12:35:12 ACIST Hand Control (13673) opened to sterile field.
12:35:12 ACIST Manifold (08209) opened to sterile field.
12:35:13 Tegaderm 4 x 4 (1626W) opened to sterile field.
12:35:14 DIAGNOSTIC WIRE .035 260cm J wire (460554) opened to
sterile field.
12:35:15 DIAGNOSTIC Multipack 5Fr catheter set (FW5371) opened
to sterile field.
12:35:30 Heparin Drip (06259yrade/250 D5W) 800 units/hr I.V.
drip infusing and turned off was administered by
Gutierrez Lee RN; for anticoagulation;
12:35:56 Nitro (50mg/250 D5W) 33 mcg/min I.V. drip infusing was
administered by Gutierrez Lee RN; for vasodilation;
12:37:07 Physician arrived
12:37:07 --------ALL STOP TIME OUT------
12:37:08 Final Timeout: patient, procedure, and site verified
with staff and physician. All members of the team are
in agreement.
12:37:10 Right groin site verified by team.
12:37:48 Fire Safety Assessment: A--An alcohol-based skin
anteseptic being used preoperatively., C--Open oxygen
or nitrous oxide is being used., D--An ESU, laser, or
fiber-optic light is being used.
12:38:40 Physical assessment completed. ASA score P 3 - A
patient with severe systemic disease as per Trevor Bhandari MD.
12:38:43 Sedation plan: IV Moderate Sedation
Medication:Fentanyl
12:39:04 Fentanyl 50 mcg I.V. was administered by Gutierrez Lee RN; for sedation;
12:41:18 Procedure started.
12:41:25 Zero performed for pressure channel P1
12:41:29 Zero performed for pressure channel P1
12:41:32 Baseline sample Acquired.
12:41:37 Zero performed for pressure channel P1
12:42:00 Local anesthetic to left femerol artery with Lidocaine
2% by Trevor Bhandari MD.**INITIAL ACCESS ONLY**
12:42:14 A 6 Fr Short sheath was inserted into the Left Femoral
artery
12:42:20 SHEATH 6FR Rhinelander (IGH992) opened to sterile field.
12:42:58 A MULTIPACK Pigtail 5 Fr catheter was advanced over
the wire and used for Procedure.
12:43:10 Nitro (50mg/250 D5W) 33 mcg/min I.V. drip
infusing(turned off) was administered by Gutierrez Lee RN; for vasodilation;
12:45:00 Catheter removed.
12:45:06 A MULTIPACK JL 4.0 5Fr catheter was advanced over the
wire and used for Procedure.
12:45:57 Catheter exchanged over wire.
12:47:26 A DIAGNOSTIC JL 6 5Fr catheter (687913D) was advanced
over the wire and used for Procedure.
12:48:38 Catheter exchanged over wire.
12:48:43 A DIAGNOSTIC JL 5 5Fr catheter (457074Q) was advanced
over the wire and used for Procedure.
12:49:03 LCA angiography performed.
12:49:04 Catheter exchanged over wire.
 
12:49:20 A DIAGNOSTIC IM 5Fr catheter (732552L) was advanced
over the wire and used for Procedure.
12:49:45 LIMA to LAD angiography performed.
12:50:39 SVG to Circ angiography performed.
12:50:49 Catheter removed.
12:51:03 GUIDE 6FR GEETHA 90 catheter ([id number]) opened to sterile
field.
12:51:13 CHOICE PT Extra Support 182cm wire (7582621Y0) opened
to sterile field.
12:51:14 INFLATOR Merit BasixCompak (AL8288) opened to sterile
field.
12:51:36 6 Fr GEETHA 90cm guide catheter was inserted over the
wire
12:51:42 CHOICE PT ES wire advanced.
12:55:08 Wire advanced across lesion.
12:57:17 Inflate balloon Inflation number: 1 A EUPHORA 2.0 x 15
Balloon ([id number]) was prepped and advanced across the
LIMA -> Dist LAD, then inflated to 13 AMANDA for 0:10
(min:sec).
12:57:35 Balloon removed over the wire.
12:59:04 Place stent Inflation Number: 2 A ANTONIO RX 2.0 x 30
stent ([id number]) was prepped and advanced across
the LIMA -> Dist LAD. The stent was deployed at 17 AMANDA
for 0:10 (min:sec).
12:59:06 Stent catheter was removed intact over wire.
12:59:06 Wire removed.
12:59:07 Guide catheter removed.
12:59:18 EXOSEAL 6Fr () opened to sterile field.
12:59:28 Sheath removed intact; hemostasis achieved with
Exoseal to the Left Femoral artery.
12:59:30 Procedure ended.(Physican Out)
13:00:52 Fentanyl 50 mcg I.V. was administered by Gutierrez Lee RN; for sedation;
13:05:34 Fluoroscopy time 09.00 minutes.
13:05:50 Flurop Dose total: 306.63
13:05:50 Fluoroscopy dose: 306.63 mGy
13:05:54 Contrast amount:Isovue 300 104ml.
13:05:55 Sharps counted by scrub and verified by R.N.
13:05:59 Post-op/insertion site Left Femoral artery dressed
using a 4 x 4 and Tegaderm.
13:06:05 Post left femerol artery:stable, soft, clean and dry
13:06:06 Post Procedure Pulses reassessed and unchanged
13:06:08 Post-procedure physical assessment completed. ASA
score P 3 - A patient with severe systemic disease as
per Trevor Bhandari MD.
13:06:10 Post procedure rhythm: unchanged.
13:06:13 Estimated blood loss: 10 ml
13:06:14 Post procedure instruction explained to
patient.Patient verbalizes understanding.
13:06:14 Patient needs reinforcement of post procedure
teaching.
13:06:27 Procedure type changed to Cath procedure, Diagnostic
procedure, LHC, LHC w/Coronaries w/Grafts, Sedation
Charges, Moderate Sedation up to 15 minutes, PCI
procedure, AMI/SVG/ PTCA or Stent, SVG-BMS/YOCASTA
Initial
13:07:34 Procedure and supply charges have been captured,
reviewed, submitted and are correct.
13:07:36 Procedure Complication : No complications
13:07:38 Vital chart was stopped
 
13:07:38 See physician's report for complete and final results.
13:07:39 Report given to CVICU.
13:07:41 Patient transfered to CVICU with Stretcher.
13:07:44 Procedure ended.
13:07:44 Full Disclosure recording stopped
13:07:55 End room use (Document Last)
Intervention Summary
Intervention Notes
Time     ActionType  Lesion and  Equipment Used Action#  Pressure  Duration
Attributes
12:57:17 Inflate     LIMA ->     EUPHORA 2.0 x  1        13        00:10
balloon     Dist LAD    15 Balloon
([id number])
12:59:04 Place stent LIMA ->     ANTONIO RX 2.0 x  2        17        00:10
Dist LAD    30 stent
([id number])
Device Usage
Item Name      Manufacture  Quantity  Catalog Number Hospital Part     Current M
inimal Lot# /
Charge   Number   Stock   Stock   Serial#
Code
ACIST Syringe  Acist        1         28522          356171   751353   053728  2
0
(10668)        Medical
Systems Inc
Bag Decanter   Microtek     1         S          730627   12987    146459  5
(S)        Medical Inc.
Medline Cath   Medline      1         [id number]      235836   14027    430899  5
Pack
([id number])
ACIST Hand     Acist        1         55723          855024   794172   047627  5
Control        Medical
(43532)        Systems Inc
ACIST Manifold Acist        1         08423          043628   634291   037437  5
(51004)        Medical
Systems Inc
Tegaderm 4 x 4 3M           1         1626W          605470   691352   757585  5
(1626W)
DIAGNOSTIC     St Akira      1         835733         366904   647470   732418  3
0
WIRE .035
260cm J wire
(641947)
DIAGNOSTIC     Cardinal     1         QQ5934         035309   95084    630049  3
0
Multipack 5Fr  Health
catheter set
(PW1461)
SHEATH 6FR     Terumo       1         FUQ845         363041   976231   634118  4
0
Rhinelander
(XNU917)
MULTIPACK      Cardinal     1                                          731609  5
Pigtail 5 Fr   Health
catheter
MULTIPACK JL   Cardinal     1                                          580361  5
4.0 5Fr        Health
catheter
DIAGNOSTIC JL  Cardinal     1         215727G        040448   390150   700970  5
6 5Fr catheter Health
 
(348220Z)
DIAGNOSTIC JL  Cardinal     1         927361N        231926   557239   071141  5
5 5Fr catheter Health
(983450Q)
DIAGNOSTIC IM  Cardinal     1         244294A        707551   226045   421693  5
5Fr catheter   Health
(640160P)
GUIDE 6FR GEETHA  Medtronic    1         [id number]        483829   04810    034764  1
90 catheter
(CG0ENJL)
CHOICE PT      Girard       1         [id number]  088252   473027   851464  5
Extra Support  Scientific
182cm wire
(8896929C3)
INFLATOR Merit Merit        1         HP1293         612452   398972   156736  1
5
AppAssure Software    Medical
(PO8755)
EUPHORA 2.0 x  Medtronic    1         [id number]       438123   407769   439332  5
       [id number]
15 Balloon
(KUV5469A)
ANTONIO RX 2.0 x  Medtronic    1         [id number]   147400   6276187  836448  5
       [id number]
30 stent
([id number])
EXOSEAL 6Fr    Cardinal     1                   087139   240448   432530  1
0
()        Health
Signature Audit [place]
Stage           Time        Signature      Unsigned
Intra-Procedure 2019   Florentino Ball
1:08:33 PM  RT(R)
Signatures
Monitor : Florentino Ball RT Signature :
______________________________
Date : ______________ Time :
______________
 
 
 
 
 
 
 
 
 
 
 
 
 
 
 
 
 
[id number] [place] [address]

## 2019-02-22 NOTE — NUR
PT ARRIVES VIA EMS TO ER AS A TRANSFER FROM [facility] WITH NSTEMI
AND MI.  PT HAS HEPARIN GTT GOING , NITRO GTT GOING AND NS AT TKO. ALL CONT. ON
ARRIVAL. PT HAS A IV IN HIS RIGHT JUGULAR. NO C/O CHEST PAIN ON ARRIVAL. STATES
HE IS HUNGRY "I HAVE NOT EATEN ALL DAY. "

## 2019-02-22 NOTE — MORECARE
CASE MANAGEMENT DISCHARGE SUMMARY
 
 
PATIENT: BROOK CABELLO                       UNIT: [id number]
ACCOUNT#: [id number]                       ADM DATE: 19
AGE: 68     : [birth year]  SEX: M            ROOM/BED: [facility]    
AUTHOR: MARCELLE ASTUDILLO                             PHYSICIAN:                               
 
REFERRING PHYSICIAN: ELIF AGRAWAL MD       
DATE OF SERVICE: 19
Discharge Plan
 
 
Patient Name: BROOK CABELLO
Facility: [facility]
Encounter #: [id number]
Medical Record #: [id number]
: 1950
Planned Disposition: 
Anticipated Discharge Date: 
 
Discharge Date: 
Expected LOS: 
Initial Reviewer: [email or web address]
Initial Review Date: 2019
Generated: 19   8:31 pm 
  
 
 
 
 
 
 
 
Patient Name: BROOK CABELLO
 
Encounter #: [id number]
Page [id number]
 
 
 
 
 
Electronically Signed by MARCELLE ASTUDILLO on 19 at 1931
 
 
 
 
 
 
**All edits/amendments must be made on the electronic document**
 
DICTATION DATE: 19     : CANDIE  19     
RPT#: [id number]                                DC DATE:        
                                               STATUS: ADM IN  
[facility]
 [place] [address]
***END OF REPORT***

## 2019-02-22 NOTE — NUR
ARRIVED TO UNIT 1930, ADMISSIONS ASSESSMENT AND HISTORY OBTAINED. PLEASE SEE
FLOW SHEETS FOR FULL DETAILS. ATTEMPT TO RESITE IV X3 UNSUCCESSFUL. RIGHT
EXTERNAL IJ IN PLACE, THREE WAY ATTACHED FOR GTTS, DRESSING CDI, PATENT ATT.
WILL CONTINUE TO TITRATE GTTS TO EFFECT AND MONITOR PATIENT.

## 2019-02-23 VITALS — DIASTOLIC BLOOD PRESSURE: 58 MMHG | SYSTOLIC BLOOD PRESSURE: 157 MMHG

## 2019-02-23 VITALS — SYSTOLIC BLOOD PRESSURE: 121 MMHG | DIASTOLIC BLOOD PRESSURE: 57 MMHG

## 2019-02-23 VITALS — SYSTOLIC BLOOD PRESSURE: 145 MMHG | DIASTOLIC BLOOD PRESSURE: 81 MMHG

## 2019-02-23 VITALS — SYSTOLIC BLOOD PRESSURE: 123 MMHG | DIASTOLIC BLOOD PRESSURE: 54 MMHG

## 2019-02-23 VITALS — DIASTOLIC BLOOD PRESSURE: 60 MMHG | SYSTOLIC BLOOD PRESSURE: 127 MMHG

## 2019-02-23 VITALS — DIASTOLIC BLOOD PRESSURE: 51 MMHG | SYSTOLIC BLOOD PRESSURE: 114 MMHG

## 2019-02-23 VITALS — SYSTOLIC BLOOD PRESSURE: 148 MMHG | DIASTOLIC BLOOD PRESSURE: 82 MMHG

## 2019-02-23 VITALS — SYSTOLIC BLOOD PRESSURE: 116 MMHG | DIASTOLIC BLOOD PRESSURE: 55 MMHG

## 2019-02-23 VITALS — DIASTOLIC BLOOD PRESSURE: 74 MMHG | SYSTOLIC BLOOD PRESSURE: 110 MMHG

## 2019-02-23 VITALS — DIASTOLIC BLOOD PRESSURE: 72 MMHG | SYSTOLIC BLOOD PRESSURE: 136 MMHG

## 2019-02-23 VITALS — SYSTOLIC BLOOD PRESSURE: 160 MMHG | DIASTOLIC BLOOD PRESSURE: 61 MMHG

## 2019-02-23 VITALS — SYSTOLIC BLOOD PRESSURE: 122 MMHG | DIASTOLIC BLOOD PRESSURE: 61 MMHG

## 2019-02-23 VITALS — SYSTOLIC BLOOD PRESSURE: 113 MMHG | DIASTOLIC BLOOD PRESSURE: 60 MMHG

## 2019-02-23 VITALS — SYSTOLIC BLOOD PRESSURE: 120 MMHG | DIASTOLIC BLOOD PRESSURE: 56 MMHG

## 2019-02-23 VITALS — DIASTOLIC BLOOD PRESSURE: 56 MMHG | SYSTOLIC BLOOD PRESSURE: 118 MMHG

## 2019-02-23 VITALS — SYSTOLIC BLOOD PRESSURE: 120 MMHG | DIASTOLIC BLOOD PRESSURE: 61 MMHG

## 2019-02-23 VITALS — DIASTOLIC BLOOD PRESSURE: 94 MMHG | SYSTOLIC BLOOD PRESSURE: 137 MMHG

## 2019-02-23 VITALS — SYSTOLIC BLOOD PRESSURE: 117 MMHG | DIASTOLIC BLOOD PRESSURE: 61 MMHG

## 2019-02-23 VITALS — SYSTOLIC BLOOD PRESSURE: 129 MMHG | DIASTOLIC BLOOD PRESSURE: 63 MMHG

## 2019-02-23 VITALS — DIASTOLIC BLOOD PRESSURE: 64 MMHG | SYSTOLIC BLOOD PRESSURE: 120 MMHG

## 2019-02-23 VITALS — SYSTOLIC BLOOD PRESSURE: 128 MMHG | DIASTOLIC BLOOD PRESSURE: 62 MMHG

## 2019-02-23 VITALS — SYSTOLIC BLOOD PRESSURE: 147 MMHG | DIASTOLIC BLOOD PRESSURE: 68 MMHG

## 2019-02-23 VITALS — SYSTOLIC BLOOD PRESSURE: 125 MMHG | DIASTOLIC BLOOD PRESSURE: 62 MMHG

## 2019-02-23 VITALS — SYSTOLIC BLOOD PRESSURE: 123 MMHG | DIASTOLIC BLOOD PRESSURE: 60 MMHG

## 2019-02-23 VITALS — DIASTOLIC BLOOD PRESSURE: 56 MMHG | SYSTOLIC BLOOD PRESSURE: 122 MMHG

## 2019-02-23 VITALS — DIASTOLIC BLOOD PRESSURE: 61 MMHG | SYSTOLIC BLOOD PRESSURE: 127 MMHG

## 2019-02-23 VITALS — SYSTOLIC BLOOD PRESSURE: 130 MMHG | DIASTOLIC BLOOD PRESSURE: 47 MMHG

## 2019-02-23 VITALS — DIASTOLIC BLOOD PRESSURE: 63 MMHG | SYSTOLIC BLOOD PRESSURE: 122 MMHG

## 2019-02-23 VITALS — SYSTOLIC BLOOD PRESSURE: 126 MMHG | DIASTOLIC BLOOD PRESSURE: 59 MMHG

## 2019-02-23 VITALS — DIASTOLIC BLOOD PRESSURE: 55 MMHG | SYSTOLIC BLOOD PRESSURE: 129 MMHG

## 2019-02-23 VITALS — SYSTOLIC BLOOD PRESSURE: 125 MMHG | DIASTOLIC BLOOD PRESSURE: 56 MMHG

## 2019-02-23 VITALS — SYSTOLIC BLOOD PRESSURE: 113 MMHG | DIASTOLIC BLOOD PRESSURE: 55 MMHG

## 2019-02-23 VITALS — DIASTOLIC BLOOD PRESSURE: 64 MMHG | SYSTOLIC BLOOD PRESSURE: 130 MMHG

## 2019-02-23 VITALS — SYSTOLIC BLOOD PRESSURE: 120 MMHG | DIASTOLIC BLOOD PRESSURE: 55 MMHG

## 2019-02-23 VITALS — SYSTOLIC BLOOD PRESSURE: 116 MMHG | DIASTOLIC BLOOD PRESSURE: 57 MMHG

## 2019-02-23 VITALS — DIASTOLIC BLOOD PRESSURE: 71 MMHG | SYSTOLIC BLOOD PRESSURE: 122 MMHG

## 2019-02-23 VITALS — SYSTOLIC BLOOD PRESSURE: 114 MMHG | DIASTOLIC BLOOD PRESSURE: 51 MMHG

## 2019-02-23 VITALS — SYSTOLIC BLOOD PRESSURE: 121 MMHG | DIASTOLIC BLOOD PRESSURE: 55 MMHG

## 2019-02-23 VITALS — SYSTOLIC BLOOD PRESSURE: 145 MMHG | DIASTOLIC BLOOD PRESSURE: 84 MMHG

## 2019-02-23 VITALS — DIASTOLIC BLOOD PRESSURE: 59 MMHG | SYSTOLIC BLOOD PRESSURE: 127 MMHG

## 2019-02-23 VITALS — DIASTOLIC BLOOD PRESSURE: 51 MMHG | SYSTOLIC BLOOD PRESSURE: 117 MMHG

## 2019-02-23 VITALS — DIASTOLIC BLOOD PRESSURE: 68 MMHG | SYSTOLIC BLOOD PRESSURE: 121 MMHG

## 2019-02-23 VITALS — SYSTOLIC BLOOD PRESSURE: 123 MMHG | DIASTOLIC BLOOD PRESSURE: 69 MMHG

## 2019-02-23 VITALS — SYSTOLIC BLOOD PRESSURE: 124 MMHG | DIASTOLIC BLOOD PRESSURE: 63 MMHG

## 2019-02-23 VITALS — DIASTOLIC BLOOD PRESSURE: 61 MMHG | SYSTOLIC BLOOD PRESSURE: 121 MMHG

## 2019-02-23 VITALS — SYSTOLIC BLOOD PRESSURE: 126 MMHG | DIASTOLIC BLOOD PRESSURE: 60 MMHG

## 2019-02-23 VITALS — DIASTOLIC BLOOD PRESSURE: 70 MMHG | SYSTOLIC BLOOD PRESSURE: 129 MMHG

## 2019-02-23 VITALS — DIASTOLIC BLOOD PRESSURE: 51 MMHG | SYSTOLIC BLOOD PRESSURE: 126 MMHG

## 2019-02-23 VITALS — SYSTOLIC BLOOD PRESSURE: 127 MMHG | DIASTOLIC BLOOD PRESSURE: 63 MMHG

## 2019-02-23 VITALS — SYSTOLIC BLOOD PRESSURE: 149 MMHG | DIASTOLIC BLOOD PRESSURE: 78 MMHG

## 2019-02-23 VITALS — DIASTOLIC BLOOD PRESSURE: 64 MMHG | SYSTOLIC BLOOD PRESSURE: 118 MMHG

## 2019-02-23 VITALS — SYSTOLIC BLOOD PRESSURE: 110 MMHG | DIASTOLIC BLOOD PRESSURE: 54 MMHG

## 2019-02-23 VITALS — SYSTOLIC BLOOD PRESSURE: 117 MMHG | DIASTOLIC BLOOD PRESSURE: 54 MMHG

## 2019-02-23 VITALS — SYSTOLIC BLOOD PRESSURE: 156 MMHG | DIASTOLIC BLOOD PRESSURE: 77 MMHG

## 2019-02-23 VITALS — SYSTOLIC BLOOD PRESSURE: 149 MMHG | DIASTOLIC BLOOD PRESSURE: 83 MMHG

## 2019-02-23 VITALS — DIASTOLIC BLOOD PRESSURE: 60 MMHG | SYSTOLIC BLOOD PRESSURE: 118 MMHG

## 2019-02-23 VITALS — DIASTOLIC BLOOD PRESSURE: 55 MMHG | SYSTOLIC BLOOD PRESSURE: 116 MMHG

## 2019-02-23 VITALS — DIASTOLIC BLOOD PRESSURE: 55 MMHG | SYSTOLIC BLOOD PRESSURE: 122 MMHG

## 2019-02-23 VITALS — SYSTOLIC BLOOD PRESSURE: 121 MMHG | DIASTOLIC BLOOD PRESSURE: 61 MMHG

## 2019-02-23 LAB
ALBUMIN SERPL-MCNC: 2.6 G/DL (ref 3.4–5)
ALP SERPL-CCNC: 78 U/L (ref 46–116)
ALT SERPL-CCNC: 11 U/L (ref 10–68)
ANION GAP SERPL CALC-SCNC: 16.2 MMOL/L (ref 8–16)
BASOPHILS NFR BLD AUTO: 0.1 % (ref 0–2)
BILIRUB SERPL-MCNC: 0.43 MG/DL (ref 0.2–1.3)
BUN SERPL-MCNC: 40 MG/DL (ref 7–18)
CALCIUM SERPL-MCNC: 7.9 MG/DL (ref 8.5–10.1)
CHLORIDE SERPL-SCNC: 102 MMOL/L (ref 98–107)
CK MB SERPL-MCNC: 1.2 U/L (ref 0–3.6)
CK SERPL-CCNC: 81 UL (ref 21–232)
CO2 SERPL-SCNC: 26.9 MMOL/L (ref 21–32)
CREAT SERPL-MCNC: 8.4 MG/DL (ref 0.6–1.3)
EOSINOPHIL NFR BLD: 1.7 % (ref 0–7)
ERYTHROCYTE [DISTWIDTH] IN BLOOD BY AUTOMATED COUNT: 16.8 % (ref 11.5–14.5)
GLOBULIN SER-MCNC: 2.9 G/L
GLUCOSE SERPL-MCNC: 91 MG/DL (ref 74–106)
HCT VFR BLD CALC: 29.3 % (ref 42–54)
HGB BLD-MCNC: 9.3 G/DL (ref 13.5–17.5)
IMM GRANULOCYTES NFR BLD: 0.3 % (ref 0–5)
LYMPHOCYTES NFR BLD AUTO: 15.8 % (ref 15–50)
MCH RBC QN AUTO: 29.8 PG (ref 26–34)
MCHC RBC AUTO-ENTMCNC: 31.7 G/DL (ref 31–37)
MCV RBC: 93.9 FL (ref 80–100)
MONOCYTES NFR BLD: 14.4 % (ref 2–11)
NEUTROPHILS NFR BLD AUTO: 67.7 % (ref 40–80)
OSMOLALITY SERPL CALC.SUM OF ELEC: 290 MOSM/KG (ref 275–300)
PLATELET # BLD: 128 10X3/UL (ref 130–400)
PMV BLD AUTO: 11.6 FL (ref 7.4–10.4)
POTASSIUM SERPL-SCNC: 4.1 MMOL/L (ref 3.5–5.1)
PROT SERPL-MCNC: 5.5 G/DL (ref 6.4–8.2)
RBC # BLD AUTO: 3.12 10X6/UL (ref 4.2–6.1)
SODIUM SERPL-SCNC: 141 MMOL/L (ref 136–145)
TROPONIN I SERPL-MCNC: 2.82 NG/ML (ref 0–0.06)
WBC # BLD AUTO: 7.2 10X3/UL (ref 4.8–10.8)

## 2019-02-23 PROCEDURE — 027034Z DILATION OF CORONARY ARTERY, ONE ARTERY WITH DRUG-ELUTING INTRALUMINAL DEVICE, PERCUTANEOUS APPROACH: ICD-10-PCS | Performed by: INTERNAL MEDICINE

## 2019-02-23 PROCEDURE — B2081ZZ PLAIN RADIOGRAPHY OF LEFT INTERNAL MAMMARY BYPASS GRAFT USING LOW OSMOLAR CONTRAST: ICD-10-PCS | Performed by: INTERNAL MEDICINE

## 2019-02-23 NOTE — NUR
REPORT RECEIVED CARE ASSUMED. PT SITTING UP IN BED PULLING OFF LINES AND
MONITORS. PT BECOME AGGRESSIVE TO STAFF WHILE ATTEMPTING TO REORIENTATE.
RESTRAINTS APPLIED BILAT WRIST. PT DISORIENTED. ASSESSMENT DONE SEE FLOW
SHEET. VSS. WILL CONTINUE TO MONITOR.

## 2019-02-23 NOTE — NUR
1850:  UNPLUGGING EKG MONITOR.  CONFUSED.
1900:  MARCO HERMAN NOTIFIED.  NEW ORDERS REC'D.  PLACED IN [place]IA WRIST
RESTRAINTS.

## 2019-02-23 NOTE — NUR
REASSESSMENT COMPLETE, PLEASE SEE FLOW SHEETS FOR DETAILS. NO ACUTE CHANGES TO
NOTE. HEMODYNAMICALLY STABLE. BED LOW AND LOCKED, CALL LIGHT IN REACH. WILL
CONTINUE PLAN OF CARE.

## 2019-02-23 NOTE — NUR
CHLORHEXADINE BATH PROVIDED, TOLERATED WELL. HEMODYNAMICALLY STABLE, BED LOW
AND LOCKED, CALL LIGHT IN REACH. WILL CPOC.

## 2019-02-23 NOTE — NUR
REC'D FROM CATH LAB VIA BED.  CONNECTED TO MONITOR.  L GROIN SOFT.  DENIES ANY
PAIN.  HEPARIN AND NTG GTTS OFF. NS VIA R EXTERNAL JUGULAR AT 20CC/HR.

## 2019-02-24 VITALS — DIASTOLIC BLOOD PRESSURE: 70 MMHG | SYSTOLIC BLOOD PRESSURE: 133 MMHG

## 2019-02-24 VITALS — SYSTOLIC BLOOD PRESSURE: 109 MMHG | DIASTOLIC BLOOD PRESSURE: 72 MMHG

## 2019-02-24 VITALS — SYSTOLIC BLOOD PRESSURE: 129 MMHG | DIASTOLIC BLOOD PRESSURE: 91 MMHG

## 2019-02-24 VITALS — SYSTOLIC BLOOD PRESSURE: 131 MMHG | DIASTOLIC BLOOD PRESSURE: 72 MMHG

## 2019-02-24 VITALS — SYSTOLIC BLOOD PRESSURE: 130 MMHG | DIASTOLIC BLOOD PRESSURE: 70 MMHG

## 2019-02-24 VITALS — DIASTOLIC BLOOD PRESSURE: 66 MMHG | SYSTOLIC BLOOD PRESSURE: 135 MMHG

## 2019-02-24 VITALS — SYSTOLIC BLOOD PRESSURE: 84 MMHG | DIASTOLIC BLOOD PRESSURE: 18 MMHG

## 2019-02-24 VITALS — SYSTOLIC BLOOD PRESSURE: 128 MMHG | DIASTOLIC BLOOD PRESSURE: 72 MMHG

## 2019-02-24 VITALS — SYSTOLIC BLOOD PRESSURE: 125 MMHG | DIASTOLIC BLOOD PRESSURE: 72 MMHG

## 2019-02-24 VITALS — SYSTOLIC BLOOD PRESSURE: 141 MMHG | DIASTOLIC BLOOD PRESSURE: 78 MMHG

## 2019-02-24 VITALS — DIASTOLIC BLOOD PRESSURE: 77 MMHG | SYSTOLIC BLOOD PRESSURE: 142 MMHG

## 2019-02-24 VITALS — DIASTOLIC BLOOD PRESSURE: 63 MMHG | SYSTOLIC BLOOD PRESSURE: 123 MMHG

## 2019-02-24 LAB
ALBUMIN SERPL-MCNC: 2.8 G/DL (ref 3.4–5)
ALP SERPL-CCNC: 89 U/L (ref 46–116)
ALT SERPL-CCNC: 17 U/L (ref 10–68)
ANION GAP SERPL CALC-SCNC: 14.5 MMOL/L (ref 8–16)
BASOPHILS NFR BLD AUTO: 0.2 % (ref 0–2)
BILIRUB SERPL-MCNC: 0.56 MG/DL (ref 0.2–1.3)
BUN SERPL-MCNC: 25 MG/DL (ref 7–18)
CALCIUM SERPL-MCNC: 8.1 MG/DL (ref 8.5–10.1)
CHLORIDE SERPL-SCNC: 100 MMOL/L (ref 98–107)
CO2 SERPL-SCNC: 28.7 MMOL/L (ref 21–32)
CREAT SERPL-MCNC: 7.6 MG/DL (ref 0.6–1.3)
EOSINOPHIL NFR BLD: 1.4 % (ref 0–7)
ERYTHROCYTE [DISTWIDTH] IN BLOOD BY AUTOMATED COUNT: 16.7 % (ref 11.5–14.5)
GLOBULIN SER-MCNC: 3.8 G/L
GLUCOSE SERPL-MCNC: 84 MG/DL (ref 74–106)
HCT VFR BLD CALC: 32.8 % (ref 42–54)
HGB BLD-MCNC: 10.2 G/DL (ref 13.5–17.5)
IMM GRANULOCYTES NFR BLD: 0.2 % (ref 0–5)
LYMPHOCYTES NFR BLD AUTO: 12.5 % (ref 15–50)
MCH RBC QN AUTO: 28.9 PG (ref 26–34)
MCHC RBC AUTO-ENTMCNC: 31.1 G/DL (ref 31–37)
MCV RBC: 92.9 FL (ref 80–100)
MONOCYTES NFR BLD: 11 % (ref 2–11)
NEUTROPHILS NFR BLD AUTO: 74.7 % (ref 40–80)
OSMOLALITY SERPL CALC.SUM OF ELEC: 281 MOSM/KG (ref 275–300)
PLATELET # BLD: 138 10X3/UL (ref 130–400)
PMV BLD AUTO: 12.7 FL (ref 7.4–10.4)
POTASSIUM SERPL-SCNC: 3.2 MMOL/L (ref 3.5–5.1)
PROT SERPL-MCNC: 6.6 G/DL (ref 6.4–8.2)
RBC # BLD AUTO: 3.53 10X6/UL (ref 4.2–6.1)
SODIUM SERPL-SCNC: 140 MMOL/L (ref 136–145)
WBC # BLD AUTO: 5.1 10X3/UL (ref 4.8–10.8)

## 2019-02-24 NOTE — NUR
CALLED PATIENTS SISTER MERARI AGAIN. NO ANSWER. DAUGHTER DID CALL AND SPOKE TO
LUIS ALFREDO QUEEN WHO TOLD HER PATIENT IS DISCHARGED AND JUST WAITING TO BE PICKED
UP.

## 2019-02-24 NOTE — NUR
PATIENTS DAUGHTER ARRIVED TO  FOR DISCHARGE. DC'D IV TO RIGHT EJ AND
ASSISTED PUTTING ON CLOTHES. GAVE DISCHARGE PAPERWORK AND INSTRUCTIONS TO
DAUGHTER. TAKEN TO VEHICLE VIA WHEEL CHAIR.

## 2019-02-24 NOTE — NUR
COMPLETE BED BATH GIVEN. RESTRAINTS REMOVED. DECREASE IN AGGRESSION NOTED. PT
EXPRESS GRATITUDE FOR BATH. VSS. NO SIGNS OF ACUTE DISTRESS NOTED WILL
CONTINUE TO MONITOR.

## 2019-02-25 NOTE — MORECARE
CASE MANAGEMENT DISCHARGE SUMMARY
 
 
PATIENT: BROOK CABELLO                       UNIT: [id number]
ACCOUNT#: [id number]                       ADM DATE: 19
AGE: 68     : 46  SEX: M            ROOM/BED: [facility]    
AUTHOR: MARCELLE ASTUDILLO                             PHYSICIAN:                               
 
REFERRING PHYSICIAN: ELIF AGRAWAL MD       
DATE OF SERVICE: 19
Discharge Plan
 
 
Patient Name: BROOK CABELLO
[facility]
Encounter #: [id number]
Medical Record #: [id number]
: 1950
Planned Disposition: 
Anticipated Discharge Date: 
 
Discharge Date: 2019
Expected LOS: 
Initial Reviewer: [id number]
Initial Review Date: 2019
Generated: 19  11:04 am 
  
 
 
 
 
 
 
 
 
Last DP export: 19   6:31 p
Patient Name: BROOK CABELLO
 
Encounter #: [id number]
Page [id number]
 
 
 
 
 
Electronically Signed by MARCELLE ASTUDILLO on 19 at 1004
 
 
 
 
 
 
**All edits/amendments must be made on the electronic document**
 
DICTATION DATE: 19 1003     : DM  19 1003     
RPT#: [id number]                                DC DATE:19
                                               STATUS: DIS IN  
[facility]
[address] [place] [address]
***END OF REPORT***

## 2019-02-26 NOTE — EC
PATIENT:BROOK CABELLO                   DATE OF SERVICE: 02/22/19
SEX: M                                  MEDICAL RECORD: [id number]
DATE OF BIRTH: [birth year]                        LOCATION:[place] [address]
AGE OF PATIENT: 68                             ADMISSION DATE: 02/22/19
 
REFERRING PHYSICIAN:                               
 
INTERPRETING PHYSICIAN: MARIA ESTHER BHANDARI MD             
 
 
 
                             ECHOCARDIOGRAM REPORT
  ECHO CHARGES 4               ECHO COMPLETE                 Date: 02/23/19
 
 
 
CLINICAL DIAGNOSIS: ASSESS EF AND VALVES,         
                    CAD,STENTS                    
                         ECHOCARDIOGRAPHIC MEASUREMENTS
      (adult normal given)
   AC root (d.<3.7cm) 4.0  cm   LV Septum d (<1.2 cm> 1.3  cm
      Valve Excursion 0.9  cm     LV Septum (systole) 1.4  cm
Left Atria (s.<4.0cm> 4.5  cm          LVPW d(<1.2cm) 1.3  cm
        RV (d.<2.3cm) 4.2  cm           LVPW (sytole) 1.5  cm
  LV diastole(<5.6CM) 6.0  cm       MV E-F(>70mm/sec)      cm
           LV systole 4.9  cm           LVOT Diameter 1.5  cm
       MV exc.(>10mm) 1.3  cm
Est.ejection fraction (50-75%)     %
 
   DOPPLER:
     LVIT      cm/sec A 96.0 cm/sec E 112   cm/sec
       LA      cm/sec      RVSP 36   mmHg
     LVOT 139  cm/sec   AOP1/2T      m/s
  Asc. Ao 312  cm/sec
     RVOT      cm/sec
       RA      cm/sec
         cm/sec
 AV Gradient Peak 39.0 mmHg  AV Mean 24.0 mmHg  AV Area 0.8  cm
 MV Gradient Peak 8.32 mmHg  MV Mean 2.13 mmHg  MV Area      cm
   COMMENTS:                                              
 
 
 Cardiac Sonographer: 2               ZHANNA YOUNGBLOOD              
      Cardiologist: 1          Dr. Bhandari                
             TAPE# PACS           
                                       Pericardial Effusion N                        
 
 
DATE OF SERVICE:  
 
FINDINGS:
1.  Left ventricular chamber size is within normal limits.  Left ventricular
systolic function is normal.  Overall ejection fraction estimated at 55%.
2.  Left atrium is enlarged at 4.5 cm.  Right atrium and right ventricle chamber
sizes are as well mildly dilated.
3.  Valvular structures:  Aortic valve demonstrates moderate-to-severe calcific
aortic stenosis, valve area calculates to 0.7 cm-squared with gradient of 39 mm
across the valve.  The remaining valvular structures have normal structure and
 
 
 
ECHOCARDIOGRAM REPORT                          [id number]    BROOK CABELLO           
 
 
motion.
4.  Doppler interrogation elsewise reveals mild mitral regurgitation, mild
tricuspid regurgitation, no other valvular insufficiency or stenosis.
5.  No evidence of pericardial effusion or left ventricular thrombus.
 
TRANSINT:[id number] Voice Confirmation ID: [id number] DOCUMENT ID: [id number]
                                           
                                           MARIA ESTHER BHANDARI MD             
 
 
 
Electronically Signed by MARIA ESTHER BHANDARI on 02/26/19 at 1118
 
 
 
 
 
 
 
 
 
 
 
 
 
 
 
 
 
 
 
 
 
 
 
 
 
 
 
 
 
 
 
 
 
 
CC:                                                             [id number]
DICTATION DATE: 02/23/19 1536     :     02/23/19 2313      DIS IN  
                                                                      02/24/19
[place] [address] [place] [address]

## 2019-02-26 NOTE — OP
PATIENT NAME:  BROOK CABELLO                             MEDICAL RECORD: [id number]
:[birth year]                                             LOCATION:D.I    D.CV05
                                                         ADMISSION DATE:19
SURGEON:  MARIA ESTHER STERN MD             
 
 
DATE OF OPERATION:  2019
 
DATE OF SERVICE:  2019
 
PROCEDURES:
1.  PTCA stent LAD through patent LIMA graft.
2.  Left heart catheterization.
3.  Selective coronary angiography.
4.  Vein graft angiography.
5.  LIMA angiography.
 
INDICATION:  Angina and coronary artery disease.
 
PROCEDURE IN DETAIL:  After informed consent was obtained and after a detailed
description of risks, benefits as well as alternative therapies, the patient
elected to proceed with angiogram and angioplasty.  The left femoral area was
prepped and draped in normal sterile fashion.  Left femoral artery was
cannulated via modified Seldinger technique with placement of 6-Kiswahili sheath. 
All catheters exchanged through this sheath.
 
FINDINGS:  The left ventriculogram was not performed due to inability to cross
the valve.
 
SELECTIVE CORONARY ANGIOGRAPHY:
1.  Left main is closed.
2.  Left anterior descending is closed.
3.  Left circumflex is  closed.
4.  Right coronary artery is closed.
5.  LIMA to the LAD is open.  The LAD has 95% stenosis after the anastomosis of
the LIMA.
6.  The vein graft to the circumflex is patent.  The previously placed stents
are widely patent.
7.  Vein graft to the RCA is closed.
 
PTCA STENT OF THE LAD THROUGH THE PATENT LIMA GRAFT:  The stent used was a 2.0 x
30 mm [place].  Result was 0% residual stenosis.
 
OVERALL IMPRESSION:  Successful percutaneous transluminal coronary angioplasty
stent of the left anterior descending through the left internal mammary artery
going from 95% initial stenosis to 0% residual.
 
TRANSINT:[id number] Voice Confirmation ID: [id number] DOCUMENT ID: [id number]
                                           
                                           MARIA ESTHER STERN MD             
 
 
 
Electronically Signed by MARIA ESTHER STERN on 19 at 1118
CC:                                                             [phone]
DICTATION DATE: 19 1304     :     19 1324      DIS IN  
                                                                      19
[facility]                                          
[address] [place] [address]

## 2019-05-26 ENCOUNTER — HOSPITAL ENCOUNTER (INPATIENT)
Dept: HOSPITAL 84 - D.ER | Age: 69
LOS: 6 days | Discharge: HOME HEALTH | DRG: 246 | End: 2019-06-01
Attending: INTERNAL MEDICINE | Admitting: INTERNAL MEDICINE
Payer: MEDICARE

## 2019-05-26 VITALS — WEIGHT: 150.32 LBS | BODY MASS INDEX: 21.52 KG/M2 | HEIGHT: 70 IN | BODY MASS INDEX: 21.52 KG/M2

## 2019-05-26 VITALS — SYSTOLIC BLOOD PRESSURE: 112 MMHG | DIASTOLIC BLOOD PRESSURE: 64 MMHG

## 2019-05-26 VITALS — DIASTOLIC BLOOD PRESSURE: 74 MMHG | SYSTOLIC BLOOD PRESSURE: 123 MMHG

## 2019-05-26 DIAGNOSIS — I70.213: ICD-10-CM

## 2019-05-26 DIAGNOSIS — D63.1: ICD-10-CM

## 2019-05-26 DIAGNOSIS — N25.0: ICD-10-CM

## 2019-05-26 DIAGNOSIS — Z99.2: ICD-10-CM

## 2019-05-26 DIAGNOSIS — I25.10: ICD-10-CM

## 2019-05-26 DIAGNOSIS — I12.0: ICD-10-CM

## 2019-05-26 DIAGNOSIS — F17.210: ICD-10-CM

## 2019-05-26 DIAGNOSIS — I21.4: Primary | ICD-10-CM

## 2019-05-26 DIAGNOSIS — E78.5: ICD-10-CM

## 2019-05-26 DIAGNOSIS — N18.6: ICD-10-CM

## 2019-05-26 LAB
APTT BLD: 84.9 SECONDS (ref 22.8–39.4)
CK MB SERPL-MCNC: 30.9 U/L (ref 0–3.6)
CK SERPL-CCNC: 263 UL (ref 21–232)
ERYTHROCYTE [DISTWIDTH] IN BLOOD BY AUTOMATED COUNT: 15.1 % (ref 11.5–14.5)
HCT VFR BLD CALC: 33.6 % (ref 42–54)
HGB BLD-MCNC: 11.2 G/DL (ref 13.5–17.5)
INR PPP: 1.36 (ref 0.85–1.17)
MCH RBC QN AUTO: 28.9 PG (ref 26–34)
MCHC RBC AUTO-ENTMCNC: 33.3 G/DL (ref 31–37)
MCV RBC: 86.8 FL (ref 80–100)
PLATELET # BLD: 119 10X3/UL (ref 130–400)
PMV BLD AUTO: 10.5 FL (ref 7.4–10.4)
PROTHROMBIN TIME: 16.2 SECONDS (ref 11.6–15)
RBC # BLD AUTO: 3.87 10X6/UL (ref 4.2–6.1)
WBC # BLD AUTO: 5.4 10X3/UL (ref 4.8–10.8)

## 2019-05-26 NOTE — HEMODYNAMI
PATIENT:BROOK CABELLO                                    MEDICAL RECORD: [id number]
: 46                                            LOCATION:[facility]     D.2114
ACCT# [id number]                                       ADMISSION DATE: 19
 
 
 Generatedon:20199:45
Patient name: BROOK CABELLO Patient #: [id number] Visit #: [id number] SSN: 
: 1950 Date
of study: 2019
Page: Of
Hemodynamic Procedure Report
****************************
Patient Data
Patient Demographics
Procedure consent was obtained
First Name: BROOK           Gender: Male
Last Name: DESTINEY          : 1950
Patient #: [id number]       Age: 68 year(s)
Visit #: [id number]       Race: 
Accession #:
[id number]
Additional ID: [id number]
Contact details
Address: [address] [place] [address]    Phone: [phone]
State: [place]
City: [place]
Zip code: [id number]
Past Medical History
Allergies: No known allergies
Admission
Admission Data
Admission Date: 2019   Admission Time: 23:23
Room #: 2114
Procedure
Procedure Types
Cath Procedure
Diagnostic Procedure
LHC
LHC w/Coronaries w/Grafts
PCI Procedure
AMI/SVG/ PTCA or Stent
SVG-BMS/YOCASTA Initial
Procedure Description
Procedure Date
Procedure Date: 2019
Procedure Start Time: 9:25
Procedure End Time: 9:43
Procedure Staff
Name                            Function
Trevor Bhandari MD                Performing Physician
Mariela Lloyd RT                    Scrub
Heidi Stein RN                Nurse
Isabell Call RT               Monitor
Procedure Data
Cath Procedure
Fluoroscopy
Diagnostic fluoroscopy      Total fluoroscopy Time: 5.4
time: 5.4 min               min
Diagnostic fluoroscopy      Total fluoroscopy dose: 286
 
dose: 286 mGy               mGy
Contrast Material
Contrast Material Type                       Amount (ml)
Isovue 300                                   69
Entry Location
Entry     Primary  Successful  Side  Size  Upsize Upsize Entry    Closure Succes
sful  Closure
Location                             (Fr)  1 (Fr) 2 (Fr) Remarks  Device        
      Remarks
Femoral                        Right 5 Fr  6 Fr                   Exoseal
artery                                     Short
Estimated blood loss: 10 ml
Diagnostic catheters
Device Type               Used For           End Catheter
Placement
DIAGNOSTIC Pigtail 5Fr    Procedure
catheter (988424S)
DIAGNOSTIC JL 5 5Fr       Procedure
catheter (167339V)
DIAGNOSTIC IM 5Fr         Procedure
catheter (948568T)
Procedure Complications
No complications
Procedure Medications
Medication           Administration Route Dosage
0.9% NaCl            I.V.
Oxygen               etCO2 Nasal cannula  2 l/min
Lidocaine 2%         added to field       20
Heparin Flush Bag    added to field       2 bags
(1000units/500ml NS)
Versed               I.V.                 2 mg
Fentanyl             I.V.                 50 mcg
Lopressor            I.V.                 5 mg
Heparin Bolus        I.V.                 5000 units
Lopressor            I.V.                 5 mg
Hemodynamics
Rest
Heart Rate: 100 (bpm)
Snapshots
Pre Cath      Intra         NCS           Post Cath
Vital Signs
Time    Heart  Resp   SPO2 etCO2   NIBP (mmHg)  Rhythm  Pain    Sedation
Rate   (ipm)  (%)  (mmHg)                       Status  Level
(bpm)
9:12:15 93     17     100  22.3    164/104(128) NSR     0 (11)  10(A)
, No
pain
9:16:37 92     16     98   24.6    169/95(136)  NSR     0 (11)  10(A)
, No
pain
9:21:04 94     19     100  15.6    161/113(139) NSR     0 (11)  10(A)
, No
pain
9:25:22 91     17     100  17.1    166/106(133) NSR     0 (11)  10(A)
, No
pain
9:29:40 102    12     100  21.6    163/108(138) ST      0 (11)  9(A)
, No
pain
9:33:54 89     13     100  32      153/100(135) NSR     0 (11)  9(A)
 
, No
pain
9:38:08 86     15     100  19.3    154/99(136)  NSR     0 (11)  10(A)
, No
pain
9:42:24 89     5           20.8    156/106(142) NSR     0 (11)  10(A)
, No
pain
Medications
Time    Medication       Route   Dose  Verified Delivered Reason          Notes 
   Effectiveness
by       by
9:11:01 0.9% NaCl        I.V.    kvo   Trevor  Heidi     used for
Elisabet Stein   procedure
RN
9:11:09 Oxygen           etCO2   2     Trevor  Heidi     used for
Nasal   l/min Elisabet Stein   procedure
cannula                RN
9:15:04 Lidocaine 2%     added   20ml  Trevor Murray   for local
to      vial  Elisabet Bhandari MD  anesthetic
field
9:15:12 Heparin Flush    added   2     Trevor Murray   used for
Bag              to      bags  Elisabet Bhandari MD  procedure
(1000units/500ml field
NS)
9:25:58 Versed           I.V.    2 mg  Trevor  Heidi     for sedation
Elisabet Stein
RN
9:26:05 Fentanyl         I.V.    50    Trevor  Heidi     for sedation
mcg   Elisabet Stein
RN
9:31:15 Lopressor        I.V.    5 mg  Trevor Torresyla     Per physician
Elisabet Stein RN
9:33:41 Heparin Bolus    I.V.    5000  Trevor  Heidi     for             verifi
ed
units Elisabet Stein   anticoagulation with Dr. LUIS ALFREDO Bhandari
9:35:47 Lopressor        I.V.    5 mg  Trevor Torresyla     Per physician
Elisabet Stein RN
Procedure Log
Time     Note
8:50:32  Informed consent obtained and on chart
8:50:50  Diagnostic Cath Status : Elective
8:51:19  Mariela Lloyd RT(R) sent for patient. Start room use.
8:51:20  Time tracking: Regular hours (M-F 7:00 - 5:00)
8:51:25  Plan of Care:Hemodynamics will remain stable., Cardiac
rhythm will remain stable., Comfort level will be
maintained., Respiratory function will remain
adequate., Patient/ family verbilizes understanding of
procedure., Procedure tolerated without complication.,
Recovers from procedure without complications..
9:05:38  Patient received from Med II to CCL 1 Alert and
oriented. Tansferred to table in Supine position.
9:05:39  Warm blankets applied, and monica hugger turned on for
patient comfort.
9:05:40  Correct patient and procedure confirmed by team.
9:05:42  ECG and BP/O2 sat monitors applied to patient.
9:07:31  Full Disclosure recording started
 
9:10:04  Rhythm: sinus rhythm
9:10:08  Vital chart was started
9:10:14  H&P Date Dictated: 2019 Within 30 days and on
chart..
9:10:16  Pre-procedure instructions explained to patient.
9:10:16  Pre-op teaching completed and patient verbalized
understanding.
9:10:18  Family unavailable.
9:10:19  Patient NPO since Midnight.
9:10:25  Patient allergic to No known allergies
9:10:27  Is the patient allergic to Iodine/contrast media? No.
9:10:29  Is patient on blood thinner?Yes
9:10:32  **ACC** The patient was administered the following
blood thiners within the last 24 hours: **ACC**Plavix
9:10:54  Patient diabetic? No.
9:11:01  0.9% NaCl kvo I.V. was administered by Heidi Stein
RN; used for procedure;
9:11:04  Previous problem with sedation/anesthesia? No ?
9:11:08  Snore? Yes
9:11:09  Oxygen 2 l/min etCO2 Nasal cannula was administered by
Heidi Stein RN; used for procedure;
9:11:09  Sleep apnea? No
9:11:10  Deviated septum? No
9:11:11  Opens mouth fully? Yes
9:11:12  Sticks out tongue? Yes
9:11:13  Airway obstruction? No ?
9:11:17  Dentures? Yes OUT
9:11:31  Patient pain scale 0/10 ?.
9:11:38  Patient pain scale 10/10 Chest.
9:11:49  Pre procedure: right dorsailis pedis pulse 1+
Palpable, but thready & weak; easily obliterated
9:11:58  IV patent on arrival in right IJ with 0.9% NaCl at
KVO.
9:12:02  Lab results completed and on chart.
9:12:08  Right groin area was prepped with chlora-prep and
draped in sterile fashion
9:12:19  Use device set Femoral Dx
9:12:20  ACIST Syringe (31936) opened to sterile field.
9:12:21  Bag Decanter (2002S) opened to sterile field.
9:12:21  Medline Cath Pack ([id number]) opened to sterile field.
9:12:22  DIAGNOSTIC WIRE .035 260cm J wire (852853) opened to
sterile field.
9:12:23  ACIST Hand Control (39429) opened to sterile field.
9:12:24  ACIST Manifold (45289) opened to sterile field.
9:12:26  SHEATH 5FR Hinton (EYA998) opened to sterile field.
9:12:48  RESERVE LEFT ARM
9:13:44  Alarms reviewed by RIraj N.
9:15:04  Lidocaine 2% 20ml vial added to field was administered
by Trevor Bhandari MD; for local anesthetic;
9:15:12  Heparin Flush Bag (1000units/500ml NS) 2 bags added to
field was administered by Trevor Bhandari MD; used for
procedure;
::  --------ALL STOP TIME OUT------
::37  Final Timeout: patient, procedure, and site verified
with staff and physician. All members of the team are
in agreement.
::38  Right groin site verified by team.
9::44  Maximum allowable Isovue 300 dose ?ml. Physician
notified. (300ml for normal creatinines. For patients
with creatinine of 1.7 or higher multiply weight(kg) x
 
5 divided by creatinine.)
9::47  Fire Safety Assessment: A--An alcohol-based skin
anteseptic being used preoperatively., C--Open oxygen
or nitrous oxide is being used., D--An ESU, laser, or
fiber-optic light is being used.
9::49  Physical assessment completed. ASA score P 2 - A
patient with mild systemic disease as per Trevor Bhandari MD.
9::51  Sedation plan: IV Moderate Sedation Medication:Versed,
Fentanyl
9::54  Procedure started.
9::34  Local anesthetic to right femoral artery with
Lidocaine 2% by Trevor Bhandari MD.**INITIAL ACCESS
ONLY**
9::58  Versed 2 mg I.V. was administered by Heidi Stein RN;
for sedation;
9::05  Fentanyl 50 mcg I.V. was administered by Heidi Stein
RN; for sedation;
9:26:08  Zero performed for pressure channel P1
9::38  A 5 Fr sheath was inserted into the Right Femoral
artery
9::49  A DIAGNOSTIC Pigtail 5Fr catheter ([id number]) was
advanced over the wire and used for Procedure.
9::51  Zero performed for pressure channel P1
9::55  Zero performed for pressure channel P1
9:27:06  Baseline sample Acquired.
9:28:14  LV gram done using MORE
9::17  Injector settings: Ml/sec: 10, Volume: 20,
9:28:29  EF : 40 %
9:28:30  Catheter removed.
9:29:05  A DIAGNOSTIC JL 5 5Fr catheter (214881V) was advanced
over the wire and used for Procedure.
9:29:21  LCA angiography performed.
9:29:23  Catheter removed.
9:29:35  A DIAGNOSTIC IM 5Fr catheter ([id number]) was advanced
over the wire and used for Procedure.
9:31:00  LIMA to LAD angiography performed.
9:31:15  Lopressor 5 mg I.V. was administered by Heidi Stein
RN; Per physician;
9:31:41  SVG to Diag angiography performed.
9:31:44  Catheter removed.
9::58  SHEATH 6FR [place]inton (ACW822) opened to sterile field.
9::58  CHOICE PT Extra Support 182cm wire ([id number]) opened
to sterile field.
9::58  INFLATOR Merit BasixCompak (KG7121) opened to sterile
field.
9:32:13  Sheath upsized to a 6 Fr Short.
9:32:23  GUIDE 6FR AR 2.0 SH catheter ([id number]) opened to
sterile field.
9:32:35  6 Fr AR 2 SH guide catheter was inserted over the wire
9:33:41  Heparin Bolus 5000 units I.V. was administered by
Heidi Stein RN; for anticoagulation; verified with
Dr. Bhandari
9:34:22  CHOICE  wire advanced.
9:34:24  Wire advanced across lesion.
9:35:19  Inflate balloon Inflation number: 1 A EUPHORA 3.5 x 30
Balloon ([id number]) was prepped and advanced across the
Undefined2, then inflated to 10 AMANDA for 0:00
(min:sec).
9:35:24  Inflation number: 1 The EUPHORA 3.5 x 30 Balloon
 
([id number]) was reinflated across the Aorta Left -> 1st
Diag, to 10 AMANDA for 0:00 (min:sec).
9:35:30  Inflation number: 2 The EUPHORA 3.5 x 30 Balloon
([id number]) was reinflated across the Aorta Left -> 1st
Diag, to 15 AMANDA for 0:00 (min:sec).
9:35:47  Lopressor 5 mg I.V. was administered by Heidi Stein
RN; Per physician;
::58  Balloon removed over the wire.
9:37:10  Place stent Inflation Number: 3 A ANTONIO RX 3.5 x 38
stent ([id number]) was prepped and advanced across
the Aorta Left -> 1st Diag. The stent was deployed at
21 AMANDA for 0:10 (min:sec).
9:37:31  Stent catheter was removed intact over wire.
9:37:36  Wire removed.
9:37:37  Guide catheter removed.
9:37:43  EXOSEAL 6Fr () opened to sterile field.
9:38:10  Sheath removed intact; hemostasis achieved with
Exoseal to the Right Femoral artery.
9:38:28  Procedure ended.(Physican Out)
9:39:22  Fluoroscopy time 05.40 minutes.
9:39:28  Flurop Dose total: 286
9:39:28  Fluoroscopy dose: 286 mGy
9:39:32  Contrast amount:Isovue 300 69ml.
9:39:33  Sharps counted by scrub and verified by R.N.
9:39:36  Post-op/insertion site Right Femoral artery dressed
using a 4 x 4 and Tegaderm.
9:39:38  Post-procedure physical assessment completed. ASA
score P 3 - A patient with severe systemic disease as
per Trevor Bhandari MD.
9:39:41  Post procedure rhythm: sinus rhythm
9:39:44  Estimated blood loss: 10 ml
9:39:59  Post procedure instruction explained to
patient.Patient verbalizes understanding.
9:39:59  Patient needs reinforcement of post procedure
teaching.
9:41:32  Procedure type changed to Cath procedure, Diagnostic
procedure, LHC, LHC w/Coronaries w/Grafts, PCI
procedure, AMI/SVG/ PTCA or Stent, SVG-BMS/YOCASTA
Initial
9:41:58  Procedure and supply charges have been captured,
reviewed, submitted and are correct.
9:42:00  Procedure Complication : No complications
9:43:40  Vital chart was stopped
9:43:40  See physician's report for complete and final results.
9:43:43  Report given to [facility].
9:43:49  Patient transfered to [facility] with Bed.
9:43:51  Procedure ended.
9:43:51  Full Disclosure recording stopped
9:43:54  End room use (Document Last)
Intervention Summary
Intervention Notes
Time    ActionType  Lesion and  Equipment Used Action#  Pressure  Duration
Attributes
9:35:19 Inflate     Undefined2  EUPHORA 3.5 x  1        10        00:00
balloon                 30 Balloon
([id number])
9:35:24 Reinflate   Aorta Left  EUPHORA 3.5 x  1        10        00:00
balloon     -> 1st Diag 30 Balloon
([id number])
9:35:30 Reinflate   Aorta Left  EUPHORA 3.5 x  2        15        00:00
 
balloon     -> 1st Diag 30 Balloon
([id number])
9:37:10 Place stent Aorta Left  ANTONIO RX 3.5 x  3        21        00:10
-> 1st Diag 38 stent
([id number])
Device Usage
Item Name      Manufacture  Quantity  Catalog Number Hospital Part     Current M
inimal Lot# /
Charge   Number   Stock   Stock   Serial#
Code
ACIST Syringe  Acist        1         49034          049758   733794   745494  2
0
(13583)        Medical
Systems Inc
Bag Decanter   Microtek     1                   664230   61618    513418  5
()        Medical Inc.
Medline Cath   Medline      1         [id number]      030558   28021    912379  5
Pack
([id number])
DIAGNOSTIC     St Akira      1         841795         817101   504128   050098  3
0
WIRE .035
260cm J wire
(982331)
ACIST Hand     Acist        1         83218          424668   118860   309495  5
Control        Medical
(63296)        Systems Inc
ACIST Manifold Acist        1         04696          683289   819300   876586  5
(50329)        Medical
Systems Inc
SHEATH 5FR     Terumo       1         [id number]         393789   347957   120408  5
Hinton
(FNQ890)
DIAGNOSTIC     Cardinal     1         184481R        620863   517686   555122  5
Pigtail 5Fr    Health
catheter
(097622C)
DIAGNOSTIC JL  Cardinal     1         042284C        451170   954325   350648  5
5 5Fr catheter Health
(990911B)
DIAGNOSTIC IM  Cardinal     1         219034Y        533765   512287   646649  5
5Fr catheter   Health
(352759O)
SHEATH 6FR     Terumo       1         WOV848         587441   211452   777982  4
0
Hinton
(YBY512)
CHOICE PT      [place]       1         [id number]  497519   374285   512427  5
Extra Support  Scientific
182cm wire
(0617810I4)
INFLATOR Merit Merit        1         QD3435         367740   091306   739401  1
5
KartMe
(UX6270)
GUIDE 6FR AR   Medtronic    1         [id number]       691641   63944    079216  1
2.0 SH
catheter
([id number])
EUPHORA 3.5 x  Medtronic    1         [id number]       076272   697937   213914  5
 
       [id number]
30 Balloon
([id number])
ANTONIO RX 3.5 x  Medtronic    1         [id number]   536706   8476015  709370  5
       [id number]
38 stent
([id number])
EXOSEAL 6Fr    Cardinal     1                   469719   824594   600906  1
0
()        Health
Signature Audit [place]
Stage           Time        Signature      Unsigned
Intra-Procedure 2019   Isabell Call
9:45:29 AM  RT(R)
Signatures
Monitor : Isabell Call Signature :
RT                       ______________________________
Date : ______________ Time :
______________
 
 
 
 
 
 
 
 
 
 
 
 
 
 
 
 
 
 
 
 
 
 
 
 
 
 
 
 
 
 
 
 
 
 
 
 
[address] [address]

## 2019-05-26 NOTE — HEMODYNAMI
PATIENT:BROOK CABELLO                                    MEDICAL RECORD: [id number]
: 46                                            LOCATION:[facility]     D.2114
[id number]                                       ADMISSION DATE: 19
 
 
 Generatedon:201910:55
Patient name: BROOK CABELLO Patient #: [id number] Visit #: [id number] SSN: 
: 1950 Date
of study: 2019
Page: Of
Hemodynamic Procedure Report
****************************
Patient Data
Patient Demographics
Procedure consent was obtained
First Name: BROOK           Gender: Male
Last Name: DESTINEY          : 1950
Patient #: [id number]       Age: 68 year(s)
Visit #: [id number]       Race: 
Accession #:
[id number]
Additional ID: [id number]
Contact details
Address: [address] [place] [address]    Phone: [phone]
State: [place]
City: [place]
Zip code: [address]
Past Medical History
Allergies: No known allergies
Admission
Admission Data
Admission Date: 2019   Admission Time: 23:23
Admit Source: Emergency
department
Room #: D.2114
Lab Results
Lab Result Date: 2019  Lab Result Time: 4:47
Biochemistry
Name         Units    Result                Min      Max
BUN          mg/dl    60       --(----)-*   7        18
Creatinine   mg/dl    9.6      --(----)-*   0.6      1.3
CBC
Name         Units    Result                Min      Max
Hematocrit   %        34.1     *-(----)--   42       54
Hemoglobin   g/dl     11.4     *-(----)--   13.5     17.5
Procedure
Procedure Types
Cath Procedure
Peripheral Cath Diagnostic Procedure
Cath Lab Peripheral Procedures
Aorto-Femoral-Run-Off
Peripheral vascular Intervention
Stent
Stent-Fem/Popw/plasty
Procedure Description
Procedure Date
Procedure Date: 2019
Procedure Start Time: 10:23
Procedure End Time: 10:53
 
Procedure Staff
Name                            Function
Trevor Bhandari MD                Performing Physician
Florentino Ball RT                  Monitor
Isabell Call RT               Scrub
Heidi Stein RN                Nurse
Jimbo Sunshine RN                Circulator
Procedure Data
Cath Procedure
Fluoroscopy
Diagnostic fluoroscopy      Total fluoroscopy Time: 5.4
time: 5.4 min               min
Diagnostic fluoroscopy      Total fluoroscopy dose: 103
dose: 103 mGy               mGy
Contrast Material
Contrast Material Type                       Amount (ml)
Isovue 370                                   70
Entry Location
Entry     Primary  Successful  Side  Size  Upsize 1   Upsize Entry    Closure Bourne
ccessful  Closure
Location                             (Fr)  (Fr)       2 (Fr) Remarks  Device    
          Remarks
Femoral                        Right 5 Fr                             Exoseal
artery
Femoral                        Left  6 Fr  6 Fr       6 Fr            Exoseal
artery                               Short Mid-Length Short
Estimated blood loss: 10 ml
Diagnostic catheters
Device Type               Used For           End Catheter
Placement
DIAGNOSTIC UF 5Fr         Procedure
catheter ([id number])
DIAGNOSTIC UF 5Fr         Procedure
catheter (404214E6)
Procedure Complications
No complications
Procedure Medications
Medication           Administration Route Dosage
0.9% NaCl            I.V.
Oxygen               etCO2 Nasal cannula  2 l/min
Lidocaine 2%         added to field       20
Heparin Flush Bag    added to field       2 bags
(1000units/500ml NS)
Versed               I.V.                 2 mg
Fentanyl             I.V.                 50 mcg
Fentanyl             I.V.                 50 mcg
Heparin Bolus        I.V.                 4000 units
Hemodynamics
Rest
HGB: 11.4 (g/dl) Heart Rate: 63 (bpm)
Snapshots
Pre Cath      Intra         NCS           Post Cath
Vital Signs
Time     Heart  Resp   SPO2 etCO2   NIBP (mmHg) Rhythm  Pain    Sedation
Rate   (ipm)  (%)  (mmHg)                      Status  Level
(bpm)
9:56:03  63     16     100  31.5    162/84(117) NSR     0 (11)  10(A)
, No
pain
10:00:29 65     19     100  21      144/78(119) NSR     0 (11)  10(A)
 
, No
pain
10:04:49 62     17     100  14.2    145/74(125) NSR     0 (11)  10(A)
, No
pain
10:09:05 62     13     100  11.9    151/83(101) NSR     0 (11)  10(A)
, No
pain
10:13:23 63     11     100  14.9    159/83(137) NSR     0 (11)  10(A)
, No
pain
10:17:43 62     12     100  21.7    166/91(107) NSR     0 (11)  10(A)
, No
pain
10:22:05 65     20     100  11.2    171/85(139) NSR     0 (11)  10(A)
, No
pain
10:26:24 64     10     100  7.4     144/81(115) NSR     0 (11)  10(A)
, No
pain
10:30:40 62     17     100  27.7    146/86(134) NSR     0 (11)  9(A)
, No
pain
10:34:58 60     13     100  21.1    158/80(129) NSR     0 (11)  9(A)
, No
pain
10:39:14 60     13     100  29.2    141/85(121) NSR     0 (11)  9(A)
, No
pain
10:43:28 60     10     100  33.7    157/85(132) NSR     0 (11)  10(A)
, No
pain
10:47:50 59     9      100  23.2    147/79(101) NSR     0 (11)  10(A)
, No
pain
10:52:06 58     10     96   35.2    164/86(132) NSR     0 (11)  10(A)
, No
pain
Medications
Time     Medication       Route   Dose  Verified Delivered Reason          Notes
    Effectiveness
by       by
9:55:06  0.9% NaCl        I.V.    kvo   Trevor  Heidi     used for
Elisabet Stein   procedure
RN
9:55:12  Oxygen           etCO2   2     Trevor  Heidi     used for
Nasal   l/min Elisabet Stein   procedure
cannula                RN
9:55:19  Lidocaine 2%     added   20ml  Trevor  Trevor   for local
to      vial  Elisabet Bhandari MD  anesthetic
field
9:55:23  Heparin Flush    added   2     Trevor  Trevor   used for
Bag              to      bags  Elisabet Bhandari MD  procedure
(1000units/500ml field
NS)
10:23:49 Versed           I.V.    2 mg  Trevor  Heidi     for sedation
Elisabet Stein
RN
10:23:57 Fentanyl         I.V.    50    Trevor  Heidi     for sedation
mcg   Elisabet Stein
 
RN
10:28:04 Fentanyl         I.V.    50    Trevor  Heidi     for sedation
mcg   Elisabet Stein
RN
10:33:10 Heparin Bolus    I.V.    4000  Trevor  Heidi     for             verif
ied
units Elisabet Stein   anticoagulation with Dr. LUIS ALFREDO Bhandari
Procedure Log
Time     Note
9:33:26  Informed consent obtained and on chart
9:34:17  Admit Source: Emergency department
9:35:51  Diagnostic Cath status Urgent
9:36:08  Jimbo Sunshine RN sent for patient. Start room use.
9:36:09  Time tracking: Regular hours (M-F 7:00 - 5:00)
9:36:14  Plan of Care:Hemodynamics will remain stable., Cardiac
rhythm will remain stable., Comfort level will be
maintained., Respiratory function will remain
adequate., Patient/ family verbilizes understanding of
procedure., Procedure tolerated without complication.,
Recovers from procedure without complications..
9:45:35  Patient received from Med II to CCL 1 Alert and
oriented. Tansferred to table in Supine position.
9:45:36  Warm blankets applied, and monica hugger turned on for
patient comfort.
9:45:37  Correct patient and procedure confirmed by team.
9:45:51  ECG and BP/O2 sat monitors applied to patient.
9:54:51  Vital chart was started
9:55:06  0.9% NaCl kvo I.V. was administered by Heidi Stein RN; used for procedure;
9:55:12  Oxygen 2 l/min etCO2 Nasal cannula was administered by
Heidi Stein RN; used for procedure;
9:55:19  Lidocaine 2% 20ml vial added to field was administered
by Trevor Bhandari MD; for local anesthetic;
9:55:23  Heparin Flush Bag (1000units/500ml NS) 2 bags added to
field was administered by Trevor Bhandari MD; used for
procedure;
10:02:11 Baseline sample Acquired.
10:02:17 Rhythm: sinus rhythm
10:02:19 Full Disclosure recording started
10:02:41 H&P Date Dictated: 2019 Within 30 days and on
chart..
10:02:43 Pre-procedure instructions explained to patient.
10:02:43 Pre-op teaching completed and patient verbalized
understanding.
10:02:50 Family unavailable.
10:02:51 Patient NPO since Midnight.
10:03:08 Patient allergic to No known allergies
10:03:10 Is the patient allergic to Iodine/contrast media? No.
10:03:12 Is patient on blood thinner?Yes
10:03:16 **ACC** The patient was administered the following
blood thiners within the last 24 hours: **ACC**Plavix
10:08:15 Patient diabetic? No.
10:08:22 Previous problem with sedation/anesthesia? No ?
10:08:23 Snore? Yes
10:08:24 Sleep apnea? No
10:08:25 Deviated septum? No
10:08:26 Opens mouth fully? Yes
10:08:27 Sticks out tongue? Yes
10:08:30 Airway obstruction? No ?
 
10:08:32 Dentures? Yes out
10:09:25 Pre procedure: right dorsailis pedis pulse 1+
Palpable, but thready & weak; easily obliterated
10:09:26 Pre procedure: left dorsailis pedis pulse 1+ Palpable,
but thready & weak; easily obliterated
10:09:29 Patient pain scale 0/10 ?.
10:09:43 IV patent on arrival in right IJ with 0.9% NaCl at
[facility].
10:10:47 Lab Result : BUN 60 mg/dl
10:10:47 Lab Result : Hemoglobin 11.4 g/dl
10:10:47 Lab Result : Creatinine 9.6 mg/dl
10:10:47 Lab Result : Hematocrit 34.1 %
10:10:50 Lab results completed and on chart.
10:11:27 Bilateral groins area was prepped with chlora-prep and
draped in sterile fashion
10:11:29 Alarms reviewed by R. N.
10:11:30 Sharps counted by scrub and verified by R.N.
10:11:33 Use device set Femoral Dx
10:11:34 ACIST Syringe (50397) opened to sterile field.
10:11:34 Bag Decanter (2002S) opened to sterile field.
10:11:36 ACIST Hand Control (83351) opened to sterile field.
10:11:36 ACIST Manifold (22777) opened to sterile field.
10:11:37 Tegaderm 4 x 4 (1626W) opened to sterile field.
10:11:37 SHEATH 5FR [place] (VFI681) opened to sterile field.
10:11:38 Medline Cath Pack ([id number]) opened to sterile field.
10:11:39 DIAGNOSTIC WIRE .035 260cm J wire (051005) opened to
sterile field.
10:11:40 DIAGNOSTIC Multipack 5Fr catheter set (VZ9323) opened
to sterile field.
10:12:04 Physician arrived
10:17:38 Zero performed for pressure channel P1
10:22:33 --------ALL STOP TIME OUT------
10:22:33 Final Timeout: patient, procedure, and site verified
with staff and physician. All members of the team are
in agreement.
10:22:35 Bilateral groins site verified by team.
10:22:40 Maximum allowable Isovue 300 dose 35.4ml. Physician
notified. (300ml for normal creatinines. For patients
with creatinine of 1.7 or higher multiply weight(kg) x
5 divided by creatinine.)
10:22:43 Fire Safety Assessment: A--An alcohol-based skin
anteseptic being used preoperatively., C--Open oxygen
or nitrous oxide is being used., D--An ESU, laser, or
fiber-optic light is being used.
10:22:45 Physical assessment completed. ASA score P 2 - A
patient with mild systemic disease as per Trevor Bhandari MD.
10::49 Sedation plan: IV Moderate Sedation Medication:Versed,
Fentanyl
10::51 Procedure started.
10:23:00 Local anesthetic to right femoral artery with
Lidocaine 2% by Trevor Bhandari MD.**INITIAL ACCESS
ONLY**
10::49 Versed 2 mg I.V. was administered by Heidi Stein RN;
for sedation;
10::57 Fentanyl 50 mcg I.V. was administered by Heidi Stein
RN; for sedation;
10::57 A 5 Fr sheath was inserted into the Right Femoral
artery
10:24:45 A DIAGNOSTIC UF 5Fr catheter ([id number]) was advanced
 
over the wire and used for Procedure.
10:25:27 Abdominal angiogram w/ runoff was performed.
10:25:31 Left leg runoff performed.
10:26:06 Right leg runoff performed.
10:27:35 EXOSEAL 5Fr () opened to sterile field.
10:27:40 SHEATH 6FR [place] (GRE520) opened to sterile field.
10:27:41 Catheter removed.
10:27:51 GLIDE WIRE Super Stiff Angled 260cm (AY8080) opened to
sterile field.
10:27:52 INFLATOR [place]it BasixCompak (CI9222) opened to sterile
field.
10:28:04 Fentanyl 50 mcg I.V. was administered by Heidi Stein RN; for sedation;
10:29:44 CHOICE PT Extra Support J 300cm guide wire ([id number])
opened to sterile field.
10:29:54 Sheath removed intact; hemostasis achieved with
Exoseal to the Right Femoral artery.
10:30:11 Local anesthetic to left femerol artery with Lidocaine
2% by Trevor Bhandari MD.**ADDITIONAL ACCESS**
10:31:17 TORQUE DEVICE PLASTIC .038 ( TD01) opened to sterile
field.
10:31:46 A 6 Fr Short sheath was inserted into the Left Femoral
artery
10:32:34 A DIAGNOSTIC UF 5Fr catheter ([id number]) was advanced
over the wire and used for Procedure.
10:32:42 glide wire advanced.
10:32:53 glide wire advanced around horn.
10:33:08 SHEATH 6FR [place] (RSR01) opened to sterile
field.
10:33:10 Heparin Bolus 4000 units I.V. was administered by
Heidi Stein RN; for anticoagulation; verified with
Dr. Bhandari
10:34:05 Catheter removed.
10:34:19 Sheath upsized to a 6 Fr Mid-Length.
10:35:08 Wire removed.
10:35:12 choice pt es wire advanced.
10:36:45 Wire advanced across lesion.
10:38:46 Inflate balloon Inflation number: 1 A SABER 6.0 x 8 x
150 balloon ([id number]) was prepped and advanced
across the Distal Superficial Femoral, Right , then
inflated to 11 AMANDA for 0:10 (min:sec) .
10:39:25 Balloon removed over the wire.
10:40:54 SMART 6 X 80 X 120 stent ([id number]) was deployed
across Distal Superficial Femoral, Right .
10:41:58 Inflation number: 2 The SABER 6.0 x 8 x 150 balloon
([id number]) was reinflated across the Distal
Superficial Femoral, Right , to 11 AMANDA for 0:10
(min:sec) .
10:41:59 Stent catheter was removed intact over wire.
10:42:22 Inflation number: 3 The SABER 6.0 x 8 x 150 balloon
([id number]) was reinflated across the Distal
Superficial Femoral, Right , to 11 AMANDA for 0:10
(min:sec) .
10:43:21 Balloon removed over the wire.
10:43:30 Wire removed.
10:43:47 Sheath upsized to a 6 Fr Short.
10:43:53 EXOSEAL 6Fr () opened to sterile field.
10:44:05 Sheath removed intact; hemostasis achieved with
Exoseal to the Left Femoral artery.
10:44:07 Procedure ended.(Physican Out)
 
10:44:22 Fluoroscopy time 05.40 minutes.
10:44: Fluoroscopy dose: 103 mGy
10:44: Flurop Dose total: 103
10:49:59 Contrast amount:Isovue 370 70ml.
10:50:00 Sharps counted by scrub and verified by R.N.
10:50:01 Insertion/operative site no bleeding no hematoma.
10:50:04 Post-op/insertion site Right Femoral artery dressed
using a 4 x 4 and Tegaderm.
10:50:07 Post-op/insertion site Left Femoral artery dressed
using a 4 x 4 and Tegaderm.
10:50:12 Post right femoral artery:stable, soft, clean and dry
10:50:16 Post left femerol artery:stable, soft, clean and dry
10:50:18 Post Procedure Pulses reassessed and unchanged
10:50:20 Post-procedure physical assessment completed. ASA
score P 2 - A patient with mild systemic disease as
per Trevor Bhandari MD.
10:50:22 Post procedure rhythm: unchanged.
10:50:25 Estimated blood loss: 10 ml
10:50:26 Post procedure instruction explained to
patient.Patient verbalizes understanding.
10:50:26 Patient needs reinforcement of post procedure
teaching.
10:50:50 Procedure type changed to Cath procedure, Peripheral
Cath Diagnostic Procedure, Cath Lab Peripheral
Procedures, Aorto-Femoral-Run-Off, Peripheral vascular
Intervention, Stent, Stent-Fem/Popw/plasty
10:52:47 Procedure and supply charges have been captured,
reviewed, submitted and are correct.
10:52:50 Procedure Complication : No complications
10:53:03 Vital chart was stopped
10:53:04 See physician's report for complete and final results.
10:53:12 Report given to PCU.
10:53:14 Patient transfered to PCU with Stretcher.
10:53:15 Procedure ended.
10:53:15 Full Disclosure recording stopped
10:53:22 End room use (Document Last)
Intervention Summary
Intervention Notes
Time     ActionType  Lesion and  Equipment   Action#  Pressure  Duration
Attributes  Used
10:38:46 Inflate     Distal      SABER 6.0 x 1        11        00:10
balloon     Superficial 8 x 150
Femoral,    balloon
Right       ([id number])
10:40:54 Deploy self Distal      SMART 6 X   1
expanding   Superficial 80 X 120
stent       Femoral,    stent
Right       ([id number])
10:41:58 Reinflate   Distal      SABER 6.0 x 2        11        00:10
balloon     Superficial 8 x 150
Femoral,    balloon
Right       (85230449N)
10:42:22 Reinflate   Distal      SABER 6.0 x 3        11        00:10
balloon     Superficial 8 x 150
Femoral,    balloon
Right       ([id number])
Device Usage
Item Name   Manufacture  Quantity  Catalog Number Hospital Part    Current Minim
al Lot# /
Charge   Number  Stock   Stock   Serial#
 
Code
ACIST       Acist        1         23529          402829   037419  839851  20
Syringe     Medical
(78521)     Systems Inc
Bag         Microtek     1                   765863   15032   275728  5
Decanter    Medical Inc.
()
ACIST Hand  Acist        1         42702          029159   932743  273606  5
Control     Medical
(40099)     Systems Inc
ACIST       Acist        1         50753          935946   118232  361125  5
Manifold    Medical
(78972)     Systems Inc
Tegaderm 4  3M           1         1626W          333532   201848  721033  5
x 4 (1626W)
SHEATH 5FR  Terumo       1         ELQ204         992521   663135  829994  5
[place]
(SQL881)
Medline     Medline      1         [id number]      075183   62417   368186  5
Cath Pack
([id number])
DIAGNOSTIC  St Akira      1         079460         879125   399268  216782  30
WIRE .035
260cm J
wire
(511361)
DIAGNOSTIC  Cardinal     1         KN3187         646483   80411   027644  30
Multipack   Health
5Fr
catheter
set
(HK8626)
DIAGNOSTIC  Cardinal     1         187156K9       215793   187183  207788  10
UF 5Fr      Health
catheter
(247663S5)
EXOSEAL 5Fr Cardinal     1                   524571   819689  636286  10
()     Health
SHEATH 6FR  Terumo       1         AUT232         018660   116462  492914  40
[place]
(BSB467)
GLIDE WIRE  Terumo       1         KO8951         395729   836949  896855  5
Super Stiff
Angled
260cm
(BY4059)
INFLATOR    Merit        1         VH3031         589863   472601  337108  15
[facility]       Medical
BasixCompak
(HG6545)
CHOICE PT   [place]       1         [id number]  6438572 310396  551836  5
Extra       Scientific
Support J
300cm guide
wire
(8252912T8)
TORQUE      [place]       1         TD01           340376   137509  125234  5
DEVICE      Scientific
PLASTIC
.038 (
 
TD01)
SHEATH 6FR  Terumo       1         RSR01          447577   28906   524709  5
Destination
(RSR01)
SABER 6.0 x Cardinal     1         [id number]      266916   960900  260358  5
8 x 150     Health
balloon
(54989977B)
SMART 6 X   Cardinal     1         [id number]       797239   214130  584235  0
80 X 120    Health
stent
(B61949JL)
EXOSEAL 6Fr Cardinal     1                   787248   242323  674386  10
()     Health
Signature Audit [place]
Stage           Time        Signature      Unsigned
Intra-Procedure 2019   Florentino Ball
10:55:30 AM RT(R)
Signatures
Monitor : Florentino Ball RT Signature :
______________________________
Date : ______________ Time :
______________
 
 
 
 
 
 
 
 
 
 
 
 
 
 
 
 
 
 
 
 
 
 
 
 
 
 
 
 
 
 
 
 
[address] [address]

## 2019-05-27 VITALS — SYSTOLIC BLOOD PRESSURE: 137 MMHG | DIASTOLIC BLOOD PRESSURE: 80 MMHG

## 2019-05-27 VITALS — SYSTOLIC BLOOD PRESSURE: 112 MMHG | DIASTOLIC BLOOD PRESSURE: 78 MMHG

## 2019-05-27 VITALS — SYSTOLIC BLOOD PRESSURE: 155 MMHG | DIASTOLIC BLOOD PRESSURE: 76 MMHG

## 2019-05-27 VITALS — DIASTOLIC BLOOD PRESSURE: 62 MMHG | SYSTOLIC BLOOD PRESSURE: 115 MMHG

## 2019-05-27 VITALS — DIASTOLIC BLOOD PRESSURE: 63 MMHG | SYSTOLIC BLOOD PRESSURE: 118 MMHG

## 2019-05-27 VITALS — DIASTOLIC BLOOD PRESSURE: 80 MMHG | SYSTOLIC BLOOD PRESSURE: 137 MMHG

## 2019-05-27 LAB
ANION GAP SERPL CALC-SCNC: 18.9 MMOL/L (ref 8–16)
BASOPHILS NFR BLD AUTO: 0 % (ref 0–2)
BUN SERPL-MCNC: 64 MG/DL (ref 7–18)
CALCIUM SERPL-MCNC: 9.8 MG/DL (ref 8.5–10.1)
CHLORIDE SERPL-SCNC: 93 MMOL/L (ref 98–107)
CK MB SERPL-MCNC: 38.5 U/L (ref 0–3.6)
CK SERPL-CCNC: 307 UL (ref 21–232)
CO2 SERPL-SCNC: 31.6 MMOL/L (ref 21–32)
CREAT SERPL-MCNC: 10.7 MG/DL (ref 0.6–1.3)
EOSINOPHIL NFR BLD: 1.6 % (ref 0–7)
ERYTHROCYTE [DISTWIDTH] IN BLOOD BY AUTOMATED COUNT: 15 % (ref 11.5–14.5)
GLUCOSE SERPL-MCNC: 97 MG/DL (ref 74–106)
HCT VFR BLD CALC: 39.7 % (ref 42–54)
HGB BLD-MCNC: 13.1 G/DL (ref 13.5–17.5)
IMM GRANULOCYTES NFR BLD: 0.2 % (ref 0–5)
LYMPHOCYTES NFR BLD AUTO: 34.1 % (ref 15–50)
MAGNESIUM SERPL-MCNC: 2.2 MG/DL (ref 1.8–2.4)
MCH RBC QN AUTO: 29.1 PG (ref 26–34)
MCHC RBC AUTO-ENTMCNC: 33 G/DL (ref 31–37)
MCV RBC: 88.2 FL (ref 80–100)
MONOCYTES NFR BLD: 14.9 % (ref 2–11)
NEUTROPHILS NFR BLD AUTO: 49.2 % (ref 40–80)
OSMOLALITY SERPL CALC.SUM OF ELEC: 295 MOSM/KG (ref 275–300)
PLATELET # BLD: 156 10X3/UL (ref 130–400)
PMV BLD AUTO: 11.7 FL (ref 7.4–10.4)
POTASSIUM SERPL-SCNC: 4.5 MMOL/L (ref 3.5–5.1)
RBC # BLD AUTO: 4.5 10X6/UL (ref 4.2–6.1)
SODIUM SERPL-SCNC: 139 MMOL/L (ref 136–145)
TROPONIN I SERPL-MCNC: 15.24 NG/ML (ref 0–0.06)
TROPONIN I SERPL-MCNC: 20.91 NG/ML (ref 0–0.06)
WBC # BLD AUTO: 6.4 10X3/UL (ref 4.8–10.8)

## 2019-05-27 NOTE — NUR
RESUMING PATIENT CARE. PATIENT IS ALERT AND ORIENTED, RESTING COMFORTABLY IN
BED. RESPIRATIONS ARE EVEN AND UNLABORED. NO S/S OF DISTRESS. NO C/O PAIN.
NEEDS MET. CALL LIGHT WITHIN REACH. WILL CPOC.

## 2019-05-27 NOTE — NUR
MORPHINE 4MG, ZOFRAN 4MG SIVP GIVEN FOR C/O CP 8/10 AT 0100 HRS.  ADMISSION
ASSESSMENT, HISTORY AND HOME MED LIST COMPLETED.  PT UNSURE OF SOME OF HIS
MEDS.  STATES HIS SISTER MANAGES THEM FOR HIM.  IV TO R NECK WITH HEPARIN AT
1000U/HR.  IV PATENT.  LUNGS DIMINISHED IN BASES BILAT.  L ARM FISTULA WITH
GOOD BRUIT AND THRILL.  ALERT AND ORIENTED TO PERSON, PLACE AND TINME.  SR PER
CM HR 70. VSS.  O2 2LNC.  PT STATES AT THIS TIME NO CP. EXPLAINED RATIONALE
FOR HEPARIN DRIP AND RATIONALE FOR NPO UNTIL SEEN BY CARDIOLOGY.  PT STATED
UNDERSTANDING.  SR UP X2, CALL LIGHT WITHIN REACH.

## 2019-05-27 NOTE — NUR
VSS THROUGHOUT NIGHT.  SR PER CM.  PT DENIED ANY CP/DISCOMFORT AFTER MORPHINE
ADMINISTRATION.  NEEDS MET; WILL CONTINUE TO MONITOR.

## 2019-05-27 NOTE — NUR
APTT 116.5 HEPARIN GTT STOPPED FOR 30 MINUTES AND RATE DECREASED 
UNITS/HR ON RESTART PT RESTING QUIETLY EYES CLOSED RESP UNLABORED SKIN W/D
COLOR WNL NAD NOTED

## 2019-05-28 VITALS — DIASTOLIC BLOOD PRESSURE: 60 MMHG | SYSTOLIC BLOOD PRESSURE: 104 MMHG

## 2019-05-28 VITALS — DIASTOLIC BLOOD PRESSURE: 62 MMHG | SYSTOLIC BLOOD PRESSURE: 106 MMHG

## 2019-05-28 VITALS — DIASTOLIC BLOOD PRESSURE: 80 MMHG | SYSTOLIC BLOOD PRESSURE: 139 MMHG

## 2019-05-28 VITALS — SYSTOLIC BLOOD PRESSURE: 124 MMHG | DIASTOLIC BLOOD PRESSURE: 58 MMHG

## 2019-05-28 VITALS — SYSTOLIC BLOOD PRESSURE: 153 MMHG | DIASTOLIC BLOOD PRESSURE: 79 MMHG

## 2019-05-28 VITALS — SYSTOLIC BLOOD PRESSURE: 97 MMHG | DIASTOLIC BLOOD PRESSURE: 60 MMHG

## 2019-05-28 LAB
ANION GAP SERPL CALC-SCNC: 24.2 MMOL/L (ref 8–16)
BASOPHILS NFR BLD AUTO: 0.2 % (ref 0–2)
BUN SERPL-MCNC: 55 MG/DL (ref 7–18)
CALCIUM SERPL-MCNC: 9 MG/DL (ref 8.5–10.1)
CHLORIDE SERPL-SCNC: 95 MMOL/L (ref 98–107)
CO2 SERPL-SCNC: 24.5 MMOL/L (ref 21–32)
CREAT SERPL-MCNC: 9.6 MG/DL (ref 0.6–1.3)
EOSINOPHIL NFR BLD: 1.5 % (ref 0–7)
ERYTHROCYTE [DISTWIDTH] IN BLOOD BY AUTOMATED COUNT: 14.8 % (ref 11.5–14.5)
GLUCOSE SERPL-MCNC: 121 MG/DL (ref 74–106)
HCT VFR BLD CALC: 38.6 % (ref 42–54)
HGB BLD-MCNC: 12.7 G/DL (ref 13.5–17.5)
IMM GRANULOCYTES NFR BLD: 0.2 % (ref 0–5)
LYMPHOCYTES NFR BLD AUTO: 24.7 % (ref 15–50)
MCH RBC QN AUTO: 29.2 PG (ref 26–34)
MCHC RBC AUTO-ENTMCNC: 32.9 G/DL (ref 31–37)
MCV RBC: 88.7 FL (ref 80–100)
MONOCYTES NFR BLD: 9.5 % (ref 2–11)
NEUTROPHILS NFR BLD AUTO: 63.9 % (ref 40–80)
OSMOLALITY SERPL CALC.SUM OF ELEC: 293 MOSM/KG (ref 275–300)
PHOSPHATE SERPL-MCNC: 8.2 MG/DL (ref 2.5–4.9)
PLATELET # BLD: 128 10X3/UL (ref 130–400)
PMV BLD AUTO: 11.7 FL (ref 7.4–10.4)
POTASSIUM SERPL-SCNC: 4.7 MMOL/L (ref 3.5–5.1)
RBC # BLD AUTO: 4.35 10X6/UL (ref 4.2–6.1)
SODIUM SERPL-SCNC: 139 MMOL/L (ref 136–145)
WBC # BLD AUTO: 4.6 10X3/UL (ref 4.8–10.8)

## 2019-05-29 VITALS — SYSTOLIC BLOOD PRESSURE: 92 MMHG | DIASTOLIC BLOOD PRESSURE: 60 MMHG

## 2019-05-29 VITALS — DIASTOLIC BLOOD PRESSURE: 76 MMHG | SYSTOLIC BLOOD PRESSURE: 134 MMHG

## 2019-05-29 VITALS — DIASTOLIC BLOOD PRESSURE: 77 MMHG | SYSTOLIC BLOOD PRESSURE: 133 MMHG

## 2019-05-29 VITALS — SYSTOLIC BLOOD PRESSURE: 151 MMHG | DIASTOLIC BLOOD PRESSURE: 86 MMHG

## 2019-05-29 VITALS — DIASTOLIC BLOOD PRESSURE: 72 MMHG | SYSTOLIC BLOOD PRESSURE: 134 MMHG

## 2019-05-29 LAB
ANION GAP SERPL CALC-SCNC: 22 MMOL/L (ref 8–16)
BASOPHILS NFR BLD AUTO: 0 % (ref 0–2)
BUN SERPL-MCNC: 75 MG/DL (ref 7–18)
CALCIUM SERPL-MCNC: 9 MG/DL (ref 8.5–10.1)
CHLORIDE SERPL-SCNC: 94 MMOL/L (ref 98–107)
CO2 SERPL-SCNC: 25.3 MMOL/L (ref 21–32)
CREAT SERPL-MCNC: 11.9 MG/DL (ref 0.6–1.3)
EOSINOPHIL NFR BLD: 1.3 % (ref 0–7)
ERYTHROCYTE [DISTWIDTH] IN BLOOD BY AUTOMATED COUNT: 14.9 % (ref 11.5–14.5)
GLUCOSE SERPL-MCNC: 91 MG/DL (ref 74–106)
HCT VFR BLD CALC: 35.5 % (ref 42–54)
HGB BLD-MCNC: 11.9 G/DL (ref 13.5–17.5)
IMM GRANULOCYTES NFR BLD: 0.2 % (ref 0–5)
LYMPHOCYTES NFR BLD AUTO: 24.3 % (ref 15–50)
MCH RBC QN AUTO: 28.9 PG (ref 26–34)
MCHC RBC AUTO-ENTMCNC: 33.5 G/DL (ref 31–37)
MCV RBC: 86.2 FL (ref 80–100)
MONOCYTES NFR BLD: 17.4 % (ref 2–11)
NEUTROPHILS NFR BLD AUTO: 56.8 % (ref 40–80)
OSMOLALITY SERPL CALC.SUM OF ELEC: 295 MOSM/KG (ref 275–300)
PHOSPHATE SERPL-MCNC: 8.9 MG/DL (ref 2.5–4.9)
PLATELET # BLD: 143 10X3/UL (ref 130–400)
PMV BLD AUTO: 11.8 FL (ref 7.4–10.4)
POTASSIUM SERPL-SCNC: 4.3 MMOL/L (ref 3.5–5.1)
RBC # BLD AUTO: 4.12 10X6/UL (ref 4.2–6.1)
SODIUM SERPL-SCNC: 137 MMOL/L (ref 136–145)
WBC # BLD AUTO: 5.4 10X3/UL (ref 4.8–10.8)

## 2019-05-29 PROCEDURE — B2111ZZ FLUOROSCOPY OF MULTIPLE CORONARY ARTERIES USING LOW OSMOLAR CONTRAST: ICD-10-PCS | Performed by: INTERNAL MEDICINE

## 2019-05-29 PROCEDURE — B2151ZZ FLUOROSCOPY OF LEFT HEART USING LOW OSMOLAR CONTRAST: ICD-10-PCS | Performed by: INTERNAL MEDICINE

## 2019-05-29 PROCEDURE — B2121ZZ FLUOROSCOPY OF SINGLE CORONARY ARTERY BYPASS GRAFT USING LOW OSMOLAR CONTRAST: ICD-10-PCS | Performed by: INTERNAL MEDICINE

## 2019-05-29 PROCEDURE — B2181ZZ FLUOROSCOPY OF LEFT INTERNAL MAMMARY BYPASS GRAFT USING LOW OSMOLAR CONTRAST: ICD-10-PCS | Performed by: INTERNAL MEDICINE

## 2019-05-29 PROCEDURE — 027034Z DILATION OF CORONARY ARTERY, ONE ARTERY WITH DRUG-ELUTING INTRALUMINAL DEVICE, PERCUTANEOUS APPROACH: ICD-10-PCS | Performed by: INTERNAL MEDICINE

## 2019-05-29 PROCEDURE — 4A023N7 MEASUREMENT OF CARDIAC SAMPLING AND PRESSURE, LEFT HEART, PERCUTANEOUS APPROACH: ICD-10-PCS | Performed by: INTERNAL MEDICINE

## 2019-05-29 NOTE — NUR
PAGED RENAL APN. PATIENT AV FISTULA CONTINUED TO BLEED. REINFORCED DRESSING 3
TIMES. SPOKE WITH KATH KUMAR, ORDERS GIVEN TO D/C'd HEPARIN.

## 2019-05-29 NOTE — NUR
RESUMING PATIENT CARE. PATIENT IS ALERT AND ORIENTED, RESTING COMFORTABLY IN
BED. RESPIRATIONS ARE EVEN AND UNLABORED. NO S/S OF DISTRESS. NO C/O PAIN.
NEED MET. CALL LIGHT WITHIN REACH. WILL CPOC.

## 2019-05-29 NOTE — NUR
WHEN ROUNDING ON PATIENT. I FOUND THAT THE AV FISTULA DRESSING WAS SATURATED
WITH BLOOD ALONG WITH GOWN AND A PORTION OF HIS BED. TOOK OFF SATURATED
DRESSING. CLEANED AREA. HELD MANUAL PRESSURE FOR APRROX. 5 MINUTES. NO ACTIVE
BLEEDING. REDRESSED AREA. WITH 4X4 AND COBAN.

## 2019-05-29 NOTE — NUR
RECEIVED PT BACK FROM CATH LAB VIA BED VSS RESP UNLABORED O2 ON 2LPN NC DRSG
C/D/I TO RT ZEE PPPX4 WILL CONTINUE TO MONITOR

## 2019-05-29 NOTE — NUR
Nutrition follow-up:
Pt NPO for heart cath this morning; now in dialysis
PO intake of renal diet has been ~40% average of last 3 meals
Labs reviewed
Wt: 150#, stable from admit
RDN following.

## 2019-05-29 NOTE — NUR
PATIENT RESTING COMFORTABLY IN BED. RESPIRATIONS ARE EVEN AND UNLABORED. NO
S/S OF DISTRESS. NO C/O PAIN. DENIES NEEDS. CALL LIGHT WITHIN REACH. WILL
CPOC.

## 2019-05-30 VITALS — DIASTOLIC BLOOD PRESSURE: 57 MMHG | SYSTOLIC BLOOD PRESSURE: 92 MMHG

## 2019-05-30 VITALS — SYSTOLIC BLOOD PRESSURE: 119 MMHG | DIASTOLIC BLOOD PRESSURE: 92 MMHG

## 2019-05-30 VITALS — SYSTOLIC BLOOD PRESSURE: 90 MMHG | DIASTOLIC BLOOD PRESSURE: 60 MMHG

## 2019-05-30 VITALS — SYSTOLIC BLOOD PRESSURE: 100 MMHG | DIASTOLIC BLOOD PRESSURE: 45 MMHG

## 2019-05-30 VITALS — DIASTOLIC BLOOD PRESSURE: 71 MMHG | SYSTOLIC BLOOD PRESSURE: 109 MMHG

## 2019-05-30 VITALS — SYSTOLIC BLOOD PRESSURE: 103 MMHG | DIASTOLIC BLOOD PRESSURE: 72 MMHG

## 2019-05-30 LAB
ANION GAP SERPL CALC-SCNC: 18 MMOL/L (ref 8–16)
BASOPHILS NFR BLD AUTO: 0.2 % (ref 0–2)
BUN SERPL-MCNC: 60 MG/DL (ref 7–18)
CALCIUM SERPL-MCNC: 9.5 MG/DL (ref 8.5–10.1)
CHLORIDE SERPL-SCNC: 95 MMOL/L (ref 98–107)
CO2 SERPL-SCNC: 27.3 MMOL/L (ref 21–32)
CREAT SERPL-MCNC: 9.6 MG/DL (ref 0.6–1.3)
EOSINOPHIL NFR BLD: 0.4 % (ref 0–7)
ERYTHROCYTE [DISTWIDTH] IN BLOOD BY AUTOMATED COUNT: 15 % (ref 11.5–14.5)
GLUCOSE SERPL-MCNC: 100 MG/DL (ref 74–106)
HCT VFR BLD CALC: 34.1 % (ref 42–54)
HGB BLD-MCNC: 11.4 G/DL (ref 13.5–17.5)
IMM GRANULOCYTES NFR BLD: 0.4 % (ref 0–5)
LYMPHOCYTES NFR BLD AUTO: 24 % (ref 15–50)
MCH RBC QN AUTO: 29.1 PG (ref 26–34)
MCHC RBC AUTO-ENTMCNC: 33.4 G/DL (ref 31–37)
MCV RBC: 87 FL (ref 80–100)
MONOCYTES NFR BLD: 16.2 % (ref 2–11)
NEUTROPHILS NFR BLD AUTO: 58.8 % (ref 40–80)
OSMOLALITY SERPL CALC.SUM OF ELEC: 288 MOSM/KG (ref 275–300)
PHOSPHATE SERPL-MCNC: 7.7 MG/DL (ref 2.5–4.9)
PLATELET # BLD: 134 10X3/UL (ref 130–400)
PMV BLD AUTO: 10.9 FL (ref 7.4–10.4)
POTASSIUM SERPL-SCNC: 4.3 MMOL/L (ref 3.5–5.1)
RBC # BLD AUTO: 3.92 10X6/UL (ref 4.2–6.1)
SODIUM SERPL-SCNC: 136 MMOL/L (ref 136–145)
WBC # BLD AUTO: 5.7 10X3/UL (ref 4.8–10.8)

## 2019-05-30 NOTE — NUR
RESUMING PATIENT CARE. PATIENT IS RESTING COMFORTABLY IN BED. RESPIRATIONS ARE
EVEN AND UNLABORED. NO S/S OF DISTRESS. NO C/O PAIN. CALL LIGHT WITHIN REACH.
WILL CPOC.

## 2019-05-31 VITALS — DIASTOLIC BLOOD PRESSURE: 70 MMHG | SYSTOLIC BLOOD PRESSURE: 120 MMHG

## 2019-05-31 VITALS — SYSTOLIC BLOOD PRESSURE: 113 MMHG | DIASTOLIC BLOOD PRESSURE: 53 MMHG

## 2019-05-31 VITALS — SYSTOLIC BLOOD PRESSURE: 140 MMHG | DIASTOLIC BLOOD PRESSURE: 65 MMHG

## 2019-05-31 VITALS — DIASTOLIC BLOOD PRESSURE: 71 MMHG | SYSTOLIC BLOOD PRESSURE: 128 MMHG

## 2019-05-31 VITALS — SYSTOLIC BLOOD PRESSURE: 126 MMHG | DIASTOLIC BLOOD PRESSURE: 67 MMHG

## 2019-05-31 VITALS — SYSTOLIC BLOOD PRESSURE: 127 MMHG | DIASTOLIC BLOOD PRESSURE: 66 MMHG

## 2019-05-31 LAB
ANION GAP SERPL CALC-SCNC: 17.8 MMOL/L (ref 8–16)
BASOPHILS NFR BLD AUTO: 0.2 % (ref 0–2)
BUN SERPL-MCNC: 87 MG/DL (ref 7–18)
CALCIUM SERPL-MCNC: 8.8 MG/DL (ref 8.5–10.1)
CHLORIDE SERPL-SCNC: 95 MMOL/L (ref 98–107)
CO2 SERPL-SCNC: 26.8 MMOL/L (ref 21–32)
CREAT SERPL-MCNC: 12.7 MG/DL (ref 0.6–1.3)
EOSINOPHIL NFR BLD: 1.8 % (ref 0–7)
ERYTHROCYTE [DISTWIDTH] IN BLOOD BY AUTOMATED COUNT: 14.8 % (ref 11.5–14.5)
GLUCOSE SERPL-MCNC: 86 MG/DL (ref 74–106)
HCT VFR BLD CALC: 32.3 % (ref 42–54)
HGB BLD-MCNC: 10.7 G/DL (ref 13.5–17.5)
IMM GRANULOCYTES NFR BLD: 0.3 % (ref 0–5)
LYMPHOCYTES NFR BLD AUTO: 25.6 % (ref 15–50)
MCH RBC QN AUTO: 28.8 PG (ref 26–34)
MCHC RBC AUTO-ENTMCNC: 33.1 G/DL (ref 31–37)
MCV RBC: 86.8 FL (ref 80–100)
MONOCYTES NFR BLD: 10.3 % (ref 2–11)
NEUTROPHILS NFR BLD AUTO: 61.8 % (ref 40–80)
OSMOLALITY SERPL CALC.SUM OF ELEC: 295 MOSM/KG (ref 275–300)
PHOSPHATE SERPL-MCNC: 9 MG/DL (ref 2.5–4.9)
PLATELET # BLD: 125 10X3/UL (ref 130–400)
PMV BLD AUTO: 10.4 FL (ref 7.4–10.4)
POTASSIUM SERPL-SCNC: 4.6 MMOL/L (ref 3.5–5.1)
RBC # BLD AUTO: 3.72 10X6/UL (ref 4.2–6.1)
SODIUM SERPL-SCNC: 135 MMOL/L (ref 136–145)
WBC # BLD AUTO: 6.1 10X3/UL (ref 4.8–10.8)

## 2019-05-31 PROCEDURE — 047K3DZ DILATION OF RIGHT FEMORAL ARTERY WITH INTRALUMINAL DEVICE, PERCUTANEOUS APPROACH: ICD-10-PCS | Performed by: INTERNAL MEDICINE

## 2019-05-31 NOTE — NUR
PATIENT RESTING COMFORTABLY IN BED. RESPIRATIONS ARE EVEN AND UNLABORED. NO
S/S OF DISTRESS. NEEDS MET. CALL LIGHT WITHIN REACH. WILL CPOC.

## 2019-05-31 NOTE — NUR
JAMES HERMAN CALLED TO SEE WHEN PT FELL.  SHE STATED THAT HE FELL AT HOME SAT
BEFORE HE CAME TO [facility] AND HADNT TOLD ANY TILL NOW.  HE TOLD DR. STERN HE
WAS HAVING SOME BLURRED VISION.  WILL MONITOR.

## 2019-05-31 NOTE — OP
PATIENT NAME:  BROOK CABELLO                             MEDICAL RECORD: [id number]
:46                                             LOCATION:D.M2     D.2114
                                                         ADMISSION DATE:19
SURGEON:  MARIA ESTHER STERN MD             
 
 
DATE OF OPERATION:  2019
 
PROCEDURES:
1.  Stent placement SFA, right.
2.  PTA SFA, right.
3.  Aortofemoral runoff.
4.  Abdominal aortography.
 
INDICATION:  Claudication, peripheral vascular disease, limb threatening
ischemia.
 
PROCEDURE PERFORMED:  After informed consent was obtained and after a detailed
description of risks, benefits as well as alternative therapies, the patient
elected to proceed with angiogram and angioplasty.  The left femoral area was
prepped and draped in normal sterile fashion.  Left femoral artery was
cannulated via modified Seldinger technique with placement of a 6-Libyan
around-the-horn sheath.  All catheters exchanged through this sheath.
 
FINDINGS:  Abdominal aortography was performed.  The catheter was pulled down
for aortofemoral runoff.  Abdominal aortography reveals no significant abdominal
aortic disease, no dissection or aneurysm formation.
 
RIGHT LEG:
A.  Iliac:  The common internal and external iliacs have moderate
irregularities, but no flow-limiting stenosis.
B.  Femoral system:  The common and deep femoral are widely patent.  Superficial
femoral has a 90% stenosis in the mid distal vessel.
C.  Popliteal and infrapopliteal vessels:  Popliteal has a previously placed
stent.  This is widely patent.  There is 2-vessel runoff through the posterior
tibial and peroneal.  Anterior tibial is chronically totally occluded.
 
LEFT LEG:
A.  Iliac:  The common internal and external iliacs have moderate
irregularities, but no flow-limiting stenosis.
B.  Femoral system:  The common superficial and deep femoral have moderate
irregularities, but no flow-limiting stenosis.
C.  Popliteal and infrapopliteal vessels:  The popliteal is widely patent and
infrapopliteal vessels are all chronically totally occluded.
 
PTA STENT OF THE RIGHT SFA:  The balloon used was a 6.0 balloon.  This yielded
suboptimal results with severe intimal dissection.  Stenting was undertaken with
a 6 x 80 Cordis SMART stent.  Result was 0% residual stenosis.
 
OVERALL IMPRESSION:  Successful PTA stent of the right SFA going from 80% to 90%
initial stenosis to 0% residual.
 
TRANSINT:[id number] Voice Confirmation ID: [id number] DOCUMENT ID: [id number]
 
 
 
OPERATIVE REPORT                               [id number]    BROOK CABELLO JEFFREY MD             
 
 
 
Electronically Signed by MARIA ESTHER STERN on 19 at 1128
 
 
 
 
 
 
 
 
 
 
 
 
 
 
 
 
 
 
 
 
 
 
 
 
 
 
 
 
 
 
 
 
 
 
 
 
 
 
 
 
 
CC:                                                             [id number]
DICTATION DATE: 19 1050     :     19 1111      ADM IN  
                                                                              
[place] [id number] [place] [id number]

## 2019-05-31 NOTE — CN
PATIENT NAME:BROOK CABELLO                                 MEDICAL RECORD: [id number]
: 46                                              LOCATION:D. D.2114
ADMIT DATE: 19                                       ACCOUNT: [id number]
CONSULTING PHYSICIAN:    MARIA ESTHER STERN MD             
                                               
REFERRING PHYSICIAN:     VANDANA MATHEW MD               
 
 
DATE OF CONSULTATION:  2019
 
DIAGNOSES:
1.  Non-Q-wave myocardial infarction.
2.  Coronary artery disease.
3.  Previous multivessel PTCA and stent.
4.  Claudication.
5.  Peripheral vascular disease.
6.  End-stage renal failure, on dialysis.
7.  Hypertension.
8.  Hyperlipidemia.
 
HISTORY OF PRESENT ILLNESS:  Mr. Cabello is a gentleman known to us with
previous multivessel PTCA and stent, last being in February.  He was doing
relatively well until Friday.  He began having chest pain on Friday.  It
escalated over the weekend.  He is pain free now.  His troponin is 21.
 
He as well complains of claudication symptomatology in both legs; however, his
left leg appears worse than the right.  He is status post PTA and stent of SFA
and popliteal last year.
 
PHYSICAL EXAMINATION:
GENERAL APPEARANCE:  Well-nourished, well-developed, appears stated age.  Level
of distress, comfortable.
PSYCHIATRIC:  Mental status, alert, normal affect.  Orientation, oriented to
time, place and person.
EYES:  Lids and conjunctiva, noninjected.  No discharge, no pallor.
ENT:  Lips, teeth, gums, normal dentition.  Oropharynx, no cyanosis, no pallor.
NECK:  Carotid arteries, bilateral normal upstroke, no bruits, no thrills.
JUGULAR VEINS:  No jugular venous pressure or distention.
CERVICAL LYMPH NODES:  Nontender, nonenlarged.
THYROID:  Not enlarged.  Nontender.  No nodules.
LUNGS:  Respiratory effort, unlabored.
CHEST:  Normal curvature.  No thoracic deformity.  No chest wall tenderness. 
Percussion, resonant.  Auscultation, clear.  No wheezes, no rales, no rhonchi.
CARDIOVASCULAR:  Precordial exam, nondisplaced.  No heaves or pericardial
thrills.  Rate and rhythm, regular.  Heart sounds, normal S1, normal S2.  No S3,
no gallop, no rub.  Systolic murmur, not heard.  Diastolic murmur, not heard.
EXTREMITIES:  No cyanosis, no edema.  He has poor pulses in both legs distally
at 0 to +1.  No bruits appreciated.
ABDOMEN:  Soft, nondistended.  Normal aorta.  No bruit.  Nontender.  No masses. 
Liver, nontender, no hepatomegaly.  Spleen, nontender, no splenomegaly.
MUSCULOSKELETAL:  No joint tenderness.  No joint swelling.  No erythema.
NEUROLOGICAL:  Normal gait, normal strength, normal tone.
SKIN:  Warm and dry.
 
OVERALL IMPRESSION:
1.  Non-Q-wave myocardial infarction.  No doubt he has recurrent hemodynamically
significant coronary artery disease with a troponin of 21.  He is pain free now.
 He is planned for dialysis tomorrow.  We will plan for cardiac catheterization
 
 
 
CONSULT REPORT                                 [id number]    BROOK CABELLO               
 
 
on Wednesday a.m.
2.  Claudication.  He has bilateral lower extremity peripheral vascular disease.
 Most likely, he has recurrent hemodynamically significant stenosis in this
territory as well.  We will proceed with aortofemoral runoff in the near future
as well.  Further care depends upon the findings of these studies.
 
TRANSINT:[id number] Voice Confirmation ID: [id number] DOCUMENT ID: [id number]
                                           
                                           MARIA ESTHER STERN MD             
 
 
 
Electronically Signed by MARIA ESTHER STERN on 19 at 1128
 
 
 
 
 
 
 
 
 
 
 
 
 
 
 
 
 
 
 
 
 
 
 
 
 
 
 
 
 
 
 
 
 
 
CC:                                                             [id number]
DICTATION DATE: 19 1054     :     19 1228      ADM IN  
                                                                              
[facility]                                          
[address] [place] [address]

## 2019-05-31 NOTE — OP
PATIENT NAME:  BROOK CABELLO                             MEDICAL RECORD: [id number]
:46                                             LOCATION:D.M2     D.2114
                                                         ADMISSION DATE:19
SURGEON:  MARIA ESTHER STERN MD             
 
 
DATE OF OPERATION:  2019
 
PROCEDURES:
1.  PTCA stent vein graft to left circumflex.
2.  Left heart catheterization.
3.  Selective coronary angiography.
4.  Vein graft angiography.
5.  LIMA angiography.
6.  Left ventriculogram.
 
INDICATION:  Non-Q-wave myocardial infarction and coronary artery disease.
 
PROCEDURE PERFORMED:  After informed consent was obtained and after detailed
description of risks, benefits as well as alternative therapies, the patient
elected to proceed with angiogram and angioplasty.  The right femoral area was
prepped and draped in normal sterile fashion.  Right femoral artery was
cannulated via modified Seldinger technique with placement of 6-Armenian sheath. 
All catheters exchanged through this sheath.
 
FINDINGS:  The left ventriculogram was performed in standard 30-degree MORE view,
reveals global hypokinesis, ejection fraction in the 40% range.
 
SELECTIVE CORONARY ANGIOGRAPHY:
1.  Left main is 99%.
2.  Left anterior descending is closed.
3.  Left circumflex is closed.
4.  Right coronary artery is closed.
5.  LIMA to the LAD is patent.  Distal LAD has previously placed stents, these
are widely patent with no significant restenosis.  No disease elsewise
throughout the distal LAD.
6.  The vein graft to the circumflex is patent; however, there is 95% stenosis
proximally.
 
PTA STENT OF THE VEIN GRAFT TO THE CIRCUMFLEX:  The stent used was a 3.5 x 38 mm
[place] taken to 21 atmospheres.  Result was 0% residual stenosis.
 
OVERALL IMPRESSION:  Successful percutaneous transluminal coronary angioplasty
stent of the vein graft to the circumflex going from 95% initial stenosis to 0%
residual.
 
TRANSINT:[id number] Voice Confirmation ID: [id number] DOCUMENT ID: [id number]
                                           
                                           MARIA ESTHER STERN MD             
 
 
 
Electronically Signed by MARIA ESTHER STERN on 19 at 1128
CC:                                                             [id number]
DICTATION DATE: 19 0943     :     19 1210      ADM IN  
                                                                              
[place] [address]

## 2019-06-01 VITALS — DIASTOLIC BLOOD PRESSURE: 72 MMHG | SYSTOLIC BLOOD PRESSURE: 124 MMHG

## 2019-06-01 VITALS — DIASTOLIC BLOOD PRESSURE: 57 MMHG | SYSTOLIC BLOOD PRESSURE: 131 MMHG

## 2019-06-01 VITALS — SYSTOLIC BLOOD PRESSURE: 132 MMHG | DIASTOLIC BLOOD PRESSURE: 59 MMHG

## 2019-06-01 VITALS — DIASTOLIC BLOOD PRESSURE: 79 MMHG | SYSTOLIC BLOOD PRESSURE: 145 MMHG

## 2019-06-01 LAB
ANION GAP SERPL CALC-SCNC: 15.8 MMOL/L (ref 8–16)
BASOPHILS NFR BLD AUTO: 0.2 % (ref 0–2)
BUN SERPL-MCNC: 47 MG/DL (ref 7–18)
CALCIUM SERPL-MCNC: 9.1 MG/DL (ref 8.5–10.1)
CHLORIDE SERPL-SCNC: 99 MMOL/L (ref 98–107)
CO2 SERPL-SCNC: 28.9 MMOL/L (ref 21–32)
CREAT SERPL-MCNC: 8 MG/DL (ref 0.6–1.3)
EOSINOPHIL NFR BLD: 0.3 % (ref 0–7)
ERYTHROCYTE [DISTWIDTH] IN BLOOD BY AUTOMATED COUNT: 14.7 % (ref 11.5–14.5)
GLUCOSE SERPL-MCNC: 98 MG/DL (ref 74–106)
HCT VFR BLD CALC: 32.4 % (ref 42–54)
HGB BLD-MCNC: 10.6 G/DL (ref 13.5–17.5)
IMM GRANULOCYTES NFR BLD: 0.2 % (ref 0–5)
LYMPHOCYTES NFR BLD AUTO: 15.2 % (ref 15–50)
MCH RBC QN AUTO: 28.6 PG (ref 26–34)
MCHC RBC AUTO-ENTMCNC: 32.7 G/DL (ref 31–37)
MCV RBC: 87.6 FL (ref 80–100)
MONOCYTES NFR BLD: 13.4 % (ref 2–11)
NEUTROPHILS NFR BLD AUTO: 70.7 % (ref 40–80)
OSMOLALITY SERPL CALC.SUM OF ELEC: 289 MOSM/KG (ref 275–300)
PHOSPHATE SERPL-MCNC: 6.2 MG/DL (ref 2.5–4.9)
PLATELET # BLD: 140 10X3/UL (ref 130–400)
PMV BLD AUTO: 11.3 FL (ref 7.4–10.4)
POTASSIUM SERPL-SCNC: 4.7 MMOL/L (ref 3.5–5.1)
RBC # BLD AUTO: 3.7 10X6/UL (ref 4.2–6.1)
SODIUM SERPL-SCNC: 139 MMOL/L (ref 136–145)
WBC # BLD AUTO: 6.4 10X3/UL (ref 4.8–10.8)

## 2019-06-01 NOTE — NUR
REVIEWED DISCHARGE INSTRUCTIONS WITH PT STATES UNDERSTANDING COPY GIVEN DCD RT
EJ SALINE LOCK WITH IV CATHETER INTA T SITE FREE OF REDNESS OR EDEMA PT
DISCHARGED HOME IN STABLE CONDITION LEFT VIA W/C WITH ALL PERSONAL BELONGINGS

## 2019-06-01 NOTE — NUR
assuming care of patient, introduced self. no s/s of distress noted. bed in
low position. call light in reach. will ctm.

## 2019-06-01 NOTE — NUR
PT SITTING UP IN BED WITH EYES CLOSED, RR EVEN AND UNLABORED. BED IN LOW
POSITION. NO S/S OF DISTRESS NOTED. 66 NORMAL SINUS ON TELEMETRY. CALL LIGHT
IN REACH. WILL CTM.

## 2019-06-03 NOTE — MORECARE
CASE MANAGEMENT DISCHARGE SUMMARY
 
 
PATIENT: BROOK CABELLO                       UNIT: [id number]
ACCOUNT#: [id number]                       ADM DATE: 19
AGE: 68     : [birth year]  SEX: M            ROOM/BED: D.2114    
AUTHOR: MARCELLE ASTUDILLO                             PHYSICIAN:                               
 
REFERRING PHYSICIAN: VANDANA MATHEW MD               
DATE OF SERVICE: 19
Discharge Plan
 
 
Patient Name: BROOK CABELLO
Facility: [facility]:[place]
Encounter #: [id number]
Medical Record #: [id number]
: 1950
Planned Disposition: Home with Home Health
Anticipated Discharge Date: 19
 
Discharge Date: 2019
Expected LOS: 6
Initial Reviewer: [email or web address]
Initial Review Date: 2019
Generated: 6/3/19  10:34 am 
Comments
 
DCP- Discharge Planning
 
Updated by [id number]: Moises Berg on 6/3/19   8:32 am CT
Patient Name:  BROOK CABELLO   
Encounter No:  [id number]   
:  1950   
Primary Insurance:  MEDICARE A & B  
Anticipated DC Date: 2019   
Planned Disposition:  Home with Home Health  
External Planned Provider: [facility]  
  
  
DCP follow-up note: CM REVIEWED CHART, PT DISCHARGED HOME OVER WEEKEND.  CM  
FAXED DISCHARGE INFORMATION TO [facility] IN [place] [phone].    
  
  
: Moises Berg
DCP- Discharge Planning
 
Updated by [id number]: Moises Berg on 19   2:58 pm CT
Patient Name: BROOK CABELLO                                     
Admission Status: ER   
 
Accout number: [id number]                              
Admission Date: 2019   
: 1950                                                        
Admission Diagnosis:OTHER SPECIFIED ABNORMAL FINDINGS OF BLOOD CHEMISTRY   
Attending: VANDANA MATHEW                                                
Current LOS:  4   
  
Anticipated DC Date:    
Planned Disposition:    
Primary Insurance: MEDICARE A & B   
  
  
Discharge Planning Comments:   
CM MET WITH PT IN ROOM TO DISCUSS DISCHARGE PLANNING AND NEEDS. PT REPORTS 
LIVING AT HOME INDEPENDENTLY AND ALONE; PT'S SISTER LIVES NEXT DOOR AND 
ASSISTS AS NEEDED AND STAYS THE NIGHT WITH PT IN THE HOME.  PT HAS ROLLING 
WALKER FROM [employer].  PT HAS HOME HEALTH WITH [facility] FOR 
NURSING AND PHYSICAL THERAPY.  CM DISCUSSED AVAILABILITY OF HOME HEALTH, 
REHAB SERVICES AND MEDICAL EQUIPMENT. PT DENIES DISCHARGE NEED AND WANTS HIS 
HOME HEALTH RESUMED WHEN HE GOES HOME FROM THE HOSPTIAL.  CONSENT SIGNED.  PT 
REPORTS HE WILL CALL HIS SISTER WHO WILL PICK HIM UP FOR DISCHARGE HOME. 
IMPORTANT MESSAGE FROM MEDICARE PROVIDED AND EXPLAINED.  
  
CM CALLED [facility] IN [place], [phone], SPOKE TO ISSAC WHO 
TOOK REFERRAL INFORMATION AND WILL PLACE PT BACK ON SCHEDULE FOR RESUMPTION 
OF HOME HEALTH CARE AT DISCHARGE.  CM FAXED REFERRAL INFORMATION TO [facility] 
[phone].  
  
FOR DISCHARGE, NOTIFY [facility] IN [place], [phone], FAX 
DISCHARGE INFORMATION TO [facility] [phone].  CM TO FOLLOW AND ASSIST AS 
NEEDED.  
  
  
: Moises Berg
DCP- Discharge Planning
 
Updated by [id number]: Moises Berg on 19   1:39 pm CT
Patient Name: BROOK CABELLO                                     
Admission Status: ER   
Accout number: [id number]                              
Admission Date: 2019   
: 1950                                                        
Admission Diagnosis:OTHER SPECIFIED ABNORMAL FINDINGS OF BLOOD CHEMISTRY   
Attending: VANDANA MATHEW                                                
Current LOS:  3   
  
Anticipated DC Date:    
Planned Disposition:    
Primary Insurance: MEDICARE A & B   
  
  
Discharge Planning Comments:   
 
CM ATTEMPTED TO MEET WITH PT FOR INITIAL ASSESSMENT OF DISCHARGE NEEDS.  PT 
WAS NOT IN ROOM AT APPROXIMATELY 0925 HOURS.  CM TO ATTEMPT ASSESSMENT OF PT 
AT A LATER TIME.  
  
  
: Moises Berg
 DCPIA - Discharge Planning Initial Assessment
 
Updated by [id number]: Moises Berg on 19   3:41 pm
*  Is the patient Alert and Oriented?
Yes
*  How many steps to enter\exit or inside your home? NONE *  PCP DR. MICHELLE REEVES *  Pharmacy
[facility] IN [place]
*  Preadmission Environment
Home Alone
*  ADLs
Independent
*  Equipment
Rolling Walker
*  Other Equipment
[place] [employer], MEDICAL EQUIPMENT PROVIDER
*  List name and contact numbers for known caregivers / representatives who 
currently or will assist patient after discharge:
MERARI CABELLO, SISTER, [phone] [address] [place]  [address]
 
*  Verbal permission to speak to the caregivers and representatives has been 
obtained from the patient.
Yes
*  Community resources currently utilized
Home Health
*  Please name any agencies selected above.
[facility]
*  Additional services required to return to the preadmission environment?
No
*  Can the patient safely return to the preadmission environment?
Yes
*  Has this patient been hospitalized within the prior 30 days at any 
hospital?
No
 
 
 
 
 
Coverage Notice
 
Reviewer: [email or web address] Leon Berg
 
Notice Issued Date-Time: 2019  14:50
Notice Type: Patient Choice Letter
 
Notice Delivered To: Patient
 
Relationship to Patient: 
Representative Name: 
 
Delivery Method: HAND - Hand Delivered
Marla Days:
Prior Verbal Notification: 
 
Recipient Understood Notice: Yes
Recipient Signature: Yes
Med Rec Note Co-signed by Attending:
 
Coverage Notice Comment:  [facility]
Reviewer: [email or web address] Leon Berg
 
Notice Issued Date-Time: 2019  14:50
Notice Type: IM Discharge Notice
 
Notice Delivered To: Patient
Relationship to Patient: 
Representative Name: 
 
Delivery Method:  - 
Marla Days:
Prior Verbal Notification: 
 
Recipient Understood Notice: Yes
Recipient Signature: Yes
Med Rec Note Co-signed by Attending:
 
Coverage Notice Comment:  
 
Last DP export: 19   8:57 a
Patient Name: BROOK CABELLO
Encounter #: [id number]
Page [id number]
 
 
 
 
 
Electronically Signed by MARCELLE ASTUDILLO on 19 at 0935
 
 
 
 
 
 
**All edits/amendments must be made on the electronic document**
 
DICTATION DATE: 19     : CANDIE  19     
RPT#: [id number]                                DC DATE:19
                                               STATUS: DIS IN  
[facility]
[address] [place] [address]
***END OF REPORT***

## 2019-07-22 ENCOUNTER — HOSPITAL ENCOUNTER (INPATIENT)
Dept: HOSPITAL 84 - D.M2 | Age: 69
LOS: 2 days | Discharge: HOME | DRG: 246 | End: 2019-07-24
Attending: INTERNAL MEDICINE | Admitting: INTERNAL MEDICINE
Payer: MEDICARE

## 2019-07-22 VITALS — SYSTOLIC BLOOD PRESSURE: 143 MMHG | DIASTOLIC BLOOD PRESSURE: 78 MMHG

## 2019-07-22 VITALS
HEIGHT: 70 IN | HEIGHT: 70 IN | BODY MASS INDEX: 22.05 KG/M2 | WEIGHT: 154 LBS | BODY MASS INDEX: 22.05 KG/M2 | BODY MASS INDEX: 22.05 KG/M2 | WEIGHT: 154 LBS

## 2019-07-22 VITALS — SYSTOLIC BLOOD PRESSURE: 166 MMHG | DIASTOLIC BLOOD PRESSURE: 87 MMHG

## 2019-07-22 VITALS — DIASTOLIC BLOOD PRESSURE: 77 MMHG | SYSTOLIC BLOOD PRESSURE: 159 MMHG

## 2019-07-22 VITALS — SYSTOLIC BLOOD PRESSURE: 145 MMHG | DIASTOLIC BLOOD PRESSURE: 80 MMHG
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
## 2019-07-22 DIAGNOSIS — E78.5: ICD-10-CM

## 2019-07-22 DIAGNOSIS — D63.1: ICD-10-CM

## 2019-07-22 DIAGNOSIS — I25.119: ICD-10-CM

## 2019-07-22 DIAGNOSIS — I21.4: Primary | ICD-10-CM

## 2019-07-22 DIAGNOSIS — N18.6: ICD-10-CM

## 2019-07-22 DIAGNOSIS — I12.0: ICD-10-CM

## 2019-07-22 DIAGNOSIS — I73.9: ICD-10-CM

## 2019-07-22 DIAGNOSIS — I35.0: ICD-10-CM

## 2019-07-22 DIAGNOSIS — T82.855A: ICD-10-CM

## 2019-07-22 LAB
ANION GAP SERPL CALC-SCNC: 17.1 MMOL/L (ref 8–16)
BASOPHILS NFR BLD AUTO: 0.2 % (ref 0–2)
BUN SERPL-MCNC: 67 MG/DL (ref 7–18)
CALCIUM SERPL-MCNC: 8.2 MG/DL (ref 8.5–10.1)
CHLORIDE SERPL-SCNC: 104 MMOL/L (ref 98–107)
CK MB SERPL-MCNC: 5 U/L (ref 0–3.6)
CK SERPL-CCNC: 56 UL (ref 21–232)
CO2 SERPL-SCNC: 28 MMOL/L (ref 21–32)
CREAT SERPL-MCNC: 11.6 MG/DL (ref 0.6–1.3)
EOSINOPHIL NFR BLD: 2.1 % (ref 0–7)
ERYTHROCYTE [DISTWIDTH] IN BLOOD BY AUTOMATED COUNT: 15.7 % (ref 11.5–14.5)
GLUCOSE SERPL-MCNC: 75 MG/DL (ref 74–106)
HCT VFR BLD CALC: 34.1 % (ref 42–54)
HGB BLD-MCNC: 11.1 G/DL (ref 13.5–17.5)
IMM GRANULOCYTES NFR BLD: 0.2 % (ref 0–5)
LYMPHOCYTES NFR BLD AUTO: 26.9 % (ref 15–50)
MCH RBC QN AUTO: 29.9 PG (ref 26–34)
MCHC RBC AUTO-ENTMCNC: 32.6 G/DL (ref 31–37)
MCV RBC: 91.9 FL (ref 80–100)
MONOCYTES NFR BLD: 10.6 % (ref 2–11)
NEUTROPHILS NFR BLD AUTO: 60 % (ref 40–80)
OSMOLALITY SERPL CALC.SUM OF ELEC: 306 MOSM/KG (ref 275–300)
PLATELET # BLD: 136 10X3/UL (ref 130–400)
PMV BLD AUTO: 10.1 FL (ref 7.4–10.4)
POTASSIUM SERPL-SCNC: 4.1 MMOL/L (ref 3.5–5.1)
RBC # BLD AUTO: 3.71 10X6/UL (ref 4.2–6.1)
SODIUM SERPL-SCNC: 145 MMOL/L (ref 136–145)
TROPONIN I SERPL-MCNC: 1.56 NG/ML (ref 0–0.06)
WBC # BLD AUTO: 4.3 10X3/UL (ref 4.8–10.8)

## 2019-07-22 NOTE — HEMODYNAMI
PATIENT:BROOK CABELLO                                    MEDICAL RECORD: [id number]
: 46                                            [address]
[id number]                                       ADMISSION DATE: 19
 
 
 Generatedon:201910:12
Patient name: BROOK CABELLO Patient #: [id number] Visit #: [id number] SSN: 
: 1950 Date
of study: 2019
Page: Of
Hemodynamic Procedure Report
****************************
Patient Data
Patient Demographics
Procedure consent was obtained
First Name: BROOK           Gender: Male
Last Name: DESITNEY          : 1950
Patient #: [id number]       Age: 68 year(s)
Visit #: [id number]       Race: 
Accession #:
[id number]
Additional ID: [id number]
Contact details
Address: [address] [place] [address]    Phone: [phone]
State: [place]
City: [place]
Zip code: [address]
Past Medical History
Allergies: No known allergies
Admission
Admission Data
Admission Date: 2019   Admission Time: 5:10
Room #: [facility]6
Lab Results
Lab Result Date: 2019  Lab Result Time: 0:00
Biochemistry
Name         Units    Result                Min      Max
BUN          mg/dl    67       --(----)-*   7        18
Creatinine   mg/dl    11.6     --(----)-*   0.6      1.3
eGFR         ml/min   5        *-(----)--   90       120
NONAFRICAN
CBC
Name         Units    Result                Min      Max
Hematocrit   %        34.1     *-(----)--   42       54
Hemoglobin   g/dl     11.1     *-(----)--   13.5     17.5
Procedure
Procedure Types
Cath Procedure
Diagnostic Procedure
LHC
LHC w/Coronaries w/Grafts
PCI Procedure
AMI/SVG/ PTCA or Stent
SVG-BMS/YOCASTA Initial
Procedure Description
Procedure Date
Procedure Date: 2019
Procedure Start Time: 9:56
Procedure End Time: 10:09
 
Procedure Staff
Name                            Function
Trevor Bhandari MD                Performing Physician
Isabell Call RT               Monitor
Heidi Stein RN                Nurse
Gutierrez Lee RN              Circulator
Maico Suit RT                     Scrub
Procedure Data
Cath Procedure
Fluoroscopy
Diagnostic fluoroscopy      Total fluoroscopy Time: 6.2
time: 6.2 min               min
Diagnostic fluoroscopy      Total fluoroscopy dose: 430
dose: 430 mGy               mGy
Contrast Material
Contrast Material Type                       Amount (ml)
Isovue 370                                   74
Entry Location
Entry     Primary  Successful  Side  Size  Upsize Upsize Entry    Closure Succes
sful  Closure
Location                             (Fr)  1 (Fr) 2 (Fr) Remarks  Device        
      Remarks
Femoral                        Right 5 Fr  6 Fr                   Exoseal
artery                                     Short
Diagnostic catheters
Device Type               Used For           End Catheter
Placement
DIAGNOSTIC Pigtail 5Fr    LV Angiography
catheter (572431N)
DIAGNOSTIC JL 5 5Fr       Left Coronary
catheter (380745Q)        Angiography
DIAGNOSTIC IM 5Fr         SVG Angiography
catheter (215398G)
Procedure Complications
No complications
Procedure Medications
Medication           Administration Route Dosage
0.9% NaCl            I.V.                 100 ml/hr
Oxygen               etCO2 Nasal cannula  2 l/min
Lidocaine 2%         added to field       20
Heparin Flush Bag    added to field       2 bags
(1000units/500ml NS)
Versed               I.V.                 2 mg
Fentanyl             I.V.                 50 mcg
Fentanyl             I.V.                 50 mcg
Integrilin (Bolus    I.V.                 6.2 ml
2mg/ml)
Plavix               P.O.                 600 mg
Hemodynamics
Rest
Heart Rate: 70 (bpm)
Snapshots
Pre Cath      Intra         NCS           Post Cath
Vital Signs
Time     Heart  Resp   SPO2 etCO2   NIBP (mmHg)  Rhythm  Pain    Sedation
Rate   (ipm)  (%)  (mmHg)                       Status  Level
(bpm)
9:38:06  67     13     100  30.8    180/81(108)  NSR     0 (11)  10(A)
, No
pain
 
9:42:31  71     10     100  36.8    200/100(120) NSR     0 (11)  10(A)
, No
pain
9:47:03  74     16     100  19      188/88(154)  NSR     0 (11)  10(A)
, No
pain
9:51:23  71     15     100  10.5    156/83(135)  NSR     0 (11)  10(A)
, No
pain
9:55:44  69     16     100  16.5    149/78(117)  NSR     0 (11)  10(A)
, No
pain
10:00:04 67     17     100  35      143/71(115)  NSR     0 (11)  9(A)
, No
pain
10:05:03 66     11     100  34.6    Measuring    NSR     0 (11)  9(A)
, No
pain
10:05:17 66     13     100  34.6    165/69(95)   NSR     0 (11)  9(A)
, No
pain
10:09:41 66     16     100  35.3    159/76(135)  NSR     0 (11)  10(A)
, No
pain
Medications
Time     Medication       Route   Dose  Verified Delivered Reason       Notes  E
ffectiveness
by       by
9:37:04  0.9% NaCl        I.V.    100   Trevor  Heidi     used for
ml/hr Elisabet Stein   procedure
RN
9:37:10  Oxygen           etCO2   2     Trevor  Heidi     used for
Nasal   l/min Elisabet Stein   procedure
cannula                RN
9:37:15  Lidocaine 2%     added   20ml  Trevor  Trevor   for local
to      vial  Elisabet Bhandari MD  anesthetic
field
9:37:19  Heparin Flush    added   2     Trevor  Trevor   used for
Bag              to      bags  Elisabet Bhandari MD  procedure
(1000units/500ml field
NS)
9:51:09  Versed           I.V.    2 mg  Trevor  Heidi     for sedation
Elisabet Stein
RN
9:51:20  Fentanyl         I.V.    50    Trevor  Heidi     for sedation
mcg   Elisabet Stein
RN
9:56:29  Fentanyl         I.V.    50    Trevor  Heiid     for sedation
mcg   Elisabet Stein
RN
10:03:45 Integrilin       I.V.    6.2   Trevor  Heidi     for          wasted
(Bolus 2mg/ml)           ml    Elisabet Stein   antiplatelet 3.7mL
RN        therapy
10:05:02 Plavix           P.O.    600   Trevor  Heidi     for
mg    Elisabet Stein   antiplatelet
RN        therapy
Procedure Log
Time     Note
9:17:58  Signed procedure consent form obtained from patient.
9:18:07  Procedure Status Urgent Heart Cath (IP).
 
9:18:09  Time tracking: Regular hours (M-F 7:00 - 5:00)
9:18:12  Plan of Care:Hemodynamics will remain stable., Cardiac
rhythm will remain stable., Comfort level will be
maintained., Respiratory function will remain
adequate., Patient/ family verbilizes understanding of
procedure., Procedure tolerated without complication.,
Recovers from procedure without complications..
9:18:27  Patient allergic to No known allergies
9:20:06  Lab Result : BUN 67 mg/dl
9:20:06  Lab Result : eGFR NONAFRICAN 5 ml/min
9:20:06  Lab Result : Creatinine 11.6 mg/dl
9:20:06  Lab Result : Hematocrit 34.1 %
9:20:06  Lab Result : Hemoglobin 11.1 g/dl
9:20:28  Gutierrez Lee RN sent for patient. Start room use.
9:32:48  Patient received from Med II to CCL 1 Alert and
oriented. Tansferred to table in Supine position.
9:32:50  Warm blankets applied, and monica hugger turned on for
patient comfort.
9:32:50  Correct patient and procedure confirmed by team.
9:32:53  ECG and BP/O2 sat monitors applied to patient.
9:36:53  Vital chart was started
9:37:04  0.9% NaCl 100 ml/hr I.V. was administered by Heidi Stein RN; used for procedure;
9:37:10  Oxygen 2 l/min etCO2 Nasal cannula was administered by
Heidi Stein RN; used for procedure;
9:37:15  Lidocaine 2% 20ml vial added to field was administered
by Trevor Bhandari MD; for local anesthetic;
9:37:19  Heparin Flush Bag (1000units/500ml NS) 2 bags added to
field was administered by Trevor Bhandari MD; used for
procedure;
9:42:11  Baseline sample Acquired.
9:42:14  Rhythm: sinus rhythm
9:42:15  Full Disclosure recording started
9:42:50  H&P Date Dictated: 2019 Within 30 days and on
chart..
9:42:58  Pre-procedure instructions explained to patient.
9:43:01  Pre-op teaching completed and patient verbalized
understanding.
9:43:02  Family unavailable.
9:43:04  Patient NPO since Midnight.
9:43:10  Is the patient allergic to Iodine/contrast media? No.
9:43:14  Is patient on blood thinner?Yes
9:43:26  Patient diabetic? No.
9:43:27  ----Pre-sedation anethsthesia assessment.----
9:43:30  Previous problem with sedation/anesthesia? No ?
9:43:32  Snore? No
9:43:33  Sleep apnea? No
9:43:35  Deviated septum? No
9:43:44  Opens mouth fully? Yes
9:43:46  Sticks out tongue? Yes
9:43:51  Airway obstruction? Yes ?
9:43:53  Airway obstruction? No ?
9:43:54  Dentures? No ?
9:43:59  Pre procedure: right dorsailis pedis pulse 2+ Normal;
easily identifiable; not easily obliterated
9:44:04  Patient pain scale 0/10 ?.
9:44:10  IV patent on arrival in right antecubital with 0.9%
NaCl at KVO.
9:44:14  Lab results completed and on chart.
9:44:21  Right groin area was prepped with chlora-prep and
 
draped in sterile fashion
9:44:22  Alarms reviewed by R. N.
9:44:23  Sharps counted by scrub and verified by R.N.
9:45:17  Physician arrived
9:50:43  --------ALL STOP TIME OUT------
9:50:43  Final Timeout: patient, procedure, and site verified
with staff and physician. All members of the team are
in agreement.
9:50:45  Right groin site verified by team.
9:50:49  Fire Safety Assessment: A--An alcohol-based skin
anteseptic being used preoperatively., C--Open oxygen
or nitrous oxide is being used., D--An ESU, laser, or
fiber-optic light is being used.
9:50:53  Physical assessment completed. ASA score P 2 - A
patient with mild systemic disease as per Trevor Bhandari MD.
9:50:56  5) <15 or on dialysis Very severe, or end stage kidney
failure.
9:51:07  Maximum allowable contrast does (3.7 X eGFR X
0.75)16.5 ml.
9:51:09  Versed 2 mg I.V. was administered by Heidi Stein RN;
for sedation;
9:51:11  Sedation plan: IV Moderate Sedation Medication:Versed,
Fentanyl
9:51:20  Fentanyl 50 mcg I.V. was administered by Heidi Stein
RN; for sedation;
9:55:09  last dose of plavix was 19
9:55:17  Use device set Femoral Dx
9:55:18  ACIST Syringe (01277) opened to sterile field.
9:55:19  Bag Decanter () opened to sterile field.
9:55:21  ACIST Hand Control (27428) opened to sterile field.
9:55:21  ACIST Manifold (96195) opened to sterile field.
9:55:24  Medline Cath Pack ([id number]) opened to sterile field.
9:55:25  Tegaderm 4 x 4 (1626W) opened to sterile field.
9:55:33  SHEATH 5FR [place] (NJJ230) opened to sterile field.
9:55:34  EMERALD Guide Wire (502-376) opened to sterile field.
9:56:07  Procedure started.
9:56:14  Local anesthetic to right femoral artery with
Lidocaine 2% by Trevor Bhandari MD.**INITIAL ACCESS
ONLY**
9:56:21  A 5 Fr sheath was inserted into the Right Femoral
artery
9:56:29  Fentanyl 50 mcg I.V. was administered by Heidi Stein RN; for sedation;
9:56:32  A DIAGNOSTIC Pigtail 5Fr catheter (001232E) was
advanced over the wire and used for LV Angiography.
9:56:36  LV angiography performed.
9:56:39  LV gram done using MORE
9:57:16  EF : 40 %
9:57:17  Catheter removed.
9:57:47  A DIAGNOSTIC JL 5 5Fr catheter (988885I) was advanced
over the wire and used for Left Coronary Angiography.
9:57:52  LCA angiography performed.
9:58:09  Catheter removed.
9:58:18  A DIAGNOSTIC IM 5Fr catheter (796083Y) was advanced
over the wire and used for SVG Angiography.
9:58:25  LIMA to LAD angiography performed.
10:01:57 SHEATH 6FR [place] (HJD456) opened to sterile field.
10:01:57 INFLATOR Merit BasixCompak (ZC5225) opened to sterile
field.
 
10:01:58 GUIDE 6FR GEETHA 90 catheter (RE6ERMO) opened to sterile
field.
10:01:58 CHOICE PT Extra Support 182cm wire ([id number]) opened
to sterile field.
10:02:10 Catheter removed.
10:02:15 Sheath upsized to a 6 Fr Short.
10:02:41 Pre PCI Site: LIMA Graft dLAD has 85% stenosis.
10:02:55 6 Fr GEETHA guide catheter was inserted over the wire
10:02:59 CPTES wire advanced.
10:03:39 Procedure type changed to Cath procedure, Diagnostic
procedure, LHC, LHC w/Coronaries w/Grafts, PCI
procedure, AMI/SVG/ PTCA or Stent, SVG-BMS/YOCASTA
Initial
10:03:45 Integrilin (Bolus 2mg/ml) 6.2 ml I.V. was administered
by Heidi Stein RN; for antiplatelet therapy; wasted
3.7mL
10:05:02 Plavix 600 mg P.O. was administered by Heidi Stein
RN; for antiplatelet therapy;
10:05:37 Inflate balloon Inflation number: 1 A EUPHORA 2.5 x 15
Balloon ([id number]) was prepped and advanced across the
LIMA -> Dist LAD 85, then inflated to 12 AMANDA for 0:12
(min:sec) .
10:05:41 Balloon removed over the wire.
10:07:05 Place stent Inflation Number: 2 A ANTONIO RX 2.5 x 08
stent ([id number]) was prepped and advanced across
the LIMA -> Dist LAD . The stent was deployed at 13
AMANDA for 0:19 (min:sec) 0.
10:07:09 Stent catheter was removed intact over wire.
10:07:09 Wire removed.
10:07:24 SVG to Circ angiography performed.
10:07:48 Guide catheter removed.
10:07:58 Contrast amount:Isovue 370 74ml.
10:08:02 Maximum allowable dose exceeded? Yes.
10:08:09 Sheath removed intact; hemostasis achieved with
Exoseal to the Right Femoral artery.
10:08:11 Procedure ended.(Physican Out)
10:08:29 Fluoroscopy time 06.20 minutes.
10:08:34 Flurop Dose total: 430
10:08:34 Fluoroscopy dose: 430 mGy
10:08:36 Sharps counted by scrub and verified by R.N.
10:08:36 Insertion/operative site no bleeding no hematoma.
10:08:39 Post-op/insertion site Right Femoral artery dressed
using a 4 x 4 and Tegaderm.
10:08:43 Post right femoral artery:stable
10:08:44 Post Procedure Pulses reassessed and unchanged
10:08:47 Post procedure: right dorsailis pedis pulse 2+ Normal;
easily identifiable; not easily obliterated.
10:08:51 Post-procedure physical assessment completed. ASA
score P 2 - A patient with mild systemic disease as
per Trevor Bhandari MD.
10:08:54 Post procedure rhythm: sinus rhythm
10:08:56 Post procedure instruction explained to
patient.Patient verbalizes understanding.
10:08:57 Procedure and supply charges have been captured,
reviewed, submitted and are correct.
10:09:13 EXOSEAL 6Fr () opened to sterile field.
10:09:32 Procedure Complication : No complications
10:09:34 Vital chart was stopped
10:09:35 See physician's report for complete and final results.
10:09:37 Report given to PCU.
 
10:09:41 Patient transfered to PCU with Bed.
10:09:43 Procedure ended.
10:09:43 Full Disclosure recording stopped
10:09:47 End room use (Document Last)
10:10:20 **ACC-PCI Only** Patient was given prescriptions, or
instructed by Trevor Bhandari MD to start/continue the
following medications upon discharge: Plavix
Intervention Summary
Intervention Notes
Time     ActionType  Lesion and  Equipment Used Action#  Pressure  Duration
Attributes
10:05:37 Inflate     LIMA ->     EUPHORA 2.5 x  1        12        00:12
balloon     Dist LAD    15 Balloon
([id number])
10:07:05 Place stent LIMA ->     ANTONIO RX 2.5 x  2        13        00:19
Dist LAD    08 stent
([id number])
Device Usage
Item Name      Manufacture  Quantity  Catalog Number Hospital Part     Current M
inimal Lot# /
Charge   Number   Stock   Stock   Serial#
Code
ACIST Syringe  Acist        1         23238          532593   053336   995220  2
0
(87855)        Medical
Systems Inc
Bag Decanter   Microtek     1         S          161033   15877    004756  5
()        Medical Inc.
ACIST Hand     Acist        1         47487          667969   446551   208100  5
Control        Medical
(65113)        Systems Inc
ACIST Manifold Acist        1         69778          332837   317635   332955  5
(49079)        Medical
Systems Inc
Medline Cath   Medline      1         [id number]      186436   39997    041423  5
Pack
([id number])
Tegaderm 4 x 4 3M           1         1626W          218123   326292   024283  5
(1626W)
SHEATH 5FR     Terumo       1         HTZ088         138047   106182   371827  5
[place]
(RBV865)
EMERALD Guide  Cardinal     1         502-455        824785   188009   150204  5
Wire (502-455) Health
DIAGNOSTIC     Cardinal     1         745671T        393756   018317   761546  5
Pigtail 5Fr    Health
catheter
(964363T)
DIAGNOSTIC JL  Cardinal     1         432277Y        023140   669755   147885  5
5 5Fr catheter Health
(620421M)
DIAGNOSTIC IM  Cardinal     1         740490R        800529   730822   494718  5
5Fr catheter   Health
(896586A)
SHEATH 6FR     Terumo       1         GLP846         409728   413348   223514  4
0
[place]
(UQC670)
INFLATOR Merit Merit        1         ND4563         604242   070277   120716  1
5
 
Geron    Medical
(XF3658)
GUIDE 6FR GEETHA  Medtronic    1         [id number]        925022   47970    241241  1
90 catheter
([id number])
CHOICE PT      Califon       1         [id number]  222923   799501   383282  5
Extra Support  Scientific
182cm wire
(8502557D3)
EUPHORA 2.5 x  Medtronic    1         [id number]       429124   432139   051330  5
       [id number]
15 Balloon
([id number])
ANTONIO RX 2.5 x  Medtronic    1         [id number]   925370   0703620  372629  5
       [id number]
08 stent
([id number])
EXOSEAL 6Fr    Cardinal     1                   529922   744651   646090  1
0
()        Health
Signature Audit [place]
Stage           Time        Signature      Unsigned
Intra-Procedure 2019   Maico PADILLA(TAMMIE)
10:12:44 AM
Signatures
Performing Physician :  Signature :
Trevor Bhandari MD        ______________________________
Date : ______________ Time :
______________
Monitor : Isabell Call Signature :
RT                       ______________________________
Date : ______________ Time :
______________
Nurse : Heidi Stein RN Signature :
______________________________
Date : ______________ Time :
______________
 
 
 
 
 
 
 
 
 
 
 
 
 
 
 
 
 
 
[address] [place] [address]

## 2019-07-22 NOTE — NUR
RESUMING PATIENT CARE. PATIENT RESTING COMFORTABLY IN BED. RESPIRATIONS ARE
EVEN AND UNLABORED. NO S/S OF DISTRESS. CALL LIGHT WITHIN REACH. WILL CPOC.

## 2019-07-22 NOTE — OP
PATIENT NAME:  BROOK CABELLO                             MEDICAL RECORD: [id number]
:46                                             LOCATION:D.M2     D.2116
                                                         ADMISSION DATE:19
SURGEON:  MARIA ESTHER STERN MD             
 
 
DATE OF OPERATION:  2019
 
DATE OF SERVICE:  2019
 
PROCEDURES:
1.  PTCA stent LAD through patent LIMA graft.
2.  Left heart catheterization.
3.  Selective coronary angiography.
4.  Vein graft angiography.
5.  LIMA angiography.
 
INDICATION:  Angina, coronary artery disease, non-Q-wave myocardial infarction.
 
DESCRIPTION OF PROCEDURE:  After informed consent was obtained and after a
detailed description of the risks, benefits as well as alternative therapies,
the patient elected to proceed with angiogram and angioplasty.  The right
femoral area was prepped and draped in normal sterile fashion.  Right femoral
artery was cannulated via modified Seldinger technique with placement of
6-Montene[place] sheath.  All catheters exchanged through this sheath.
 
FINDINGS:  The left ventriculogram was performed in standard 30-degree MORE view,
reveals mild global hypokinesis, ejection fraction 40%.
 
SELECTIVE CORONARY ANGIOGRAPHY:
1.  Left main is with no significant angiographic disease.
2.  Left anterior descending is closed.
3.  Left circumflex is closed.
4.  Right coronary artery is closed.
5.  LIMA to the LAD is patent.  There are previously placed stents in the LAD. 
After this, there is 95% in-stent restenosis.
6.  Vein graft to the circumflex is patent.  Previously placed stents are widely
patent.
 
PTCA STENT OF THE LAD THROUGH THE LIMA:  The stent used is a 2.5 x 8 mm [place]. 
Result was 0% residual stenosis.
 
OVERALL IMPRESSION:  Successful percutaneous transluminal coronary angioplasty
stent of the left anterior descending through the left internal mammary artery
graft going from 95% in-stent restenosis to 0% residual stenosis.
 
TRANSINT:[id number] Voice Confirmation ID: [id number] DOCUMENT ID: [id number]
                                           
                                           MARIA ESTHER STERN MD             
 
CC:                                                             [id number]
DICTATION DATE: 19 1014     :     19 1121      ADM IN  
                                                                              
[address] [place] [address]

## 2019-07-22 NOTE — NUR
PT HAS ARRIVED TO FLOOR BY STRETCHER, NO S/S OF DISTRESS NOTED AT THIS TIME.
ANSWERS QUESTIONS APPROPRIATELY. PT STATES HE LAST TOOK A NITRO SUBLINGUAL AT
APPROXIMATELY 1700 ON 7/21/2019. PT STATES HE HAS BEEN HAVING WORSENING CHEST
PAIN FOR THE PAST 4 TO 5 DAYS.

## 2019-07-22 NOTE — HP
PATIENT: BROOK CABELLO                                   MEDICAL RECORD: [id number]
ACCOUNT: [id number]                                    LOCATION:[address]
: [birth year]                                            ADMISSION DATE: 19
                                                         PCP: ELIF AGRAWAL MD
 
                             HISTORY AND PHYSICAL EXAMINATION
 
 
DIAGNOSES:
1.  Non-Q-wave myocardial infarction.
2.  Coronary artery disease.
3.  Previous coronary artery bypass graft surgery.
4.  Previous multivessel percutaneous transluminal coronary angioplasty stent.
5.  End-stage renal failure, on dialysis.
6.  Hypertension.
7.  Hyperlipidemia.
8.  Aortic stenosis.
 
HISTORY OF PRESENT ILLNESS:  Mr. Cabello' last intervention was in May.  His
chest pain markedly improved, but after that; however, the past week, he has had
daily episodes of chest pain.  He had prolonged severe episode of chest pain
last night and not relieved with multiple sublingual nitro.  When he presented
to [facility], he had ST-T changes in the lateral leads. 
These have resolved.  He is pain free now.
 
PHYSICAL EXAMINATION:
GENERAL APPEARANCE:  Well-nourished, well-developed, appears stated age.  Level
of distress, comfortable. 
PSYCHIATRIC:  Mental status, alert, normal affect.  Orientation, oriented to
time, place and person.  
EYES:  Lids and conjunctiva, noninjected.  No discharge, no pallor. 
ENT:  Lips, teeth, gums, normal dentition.  Oropharynx, no cyanosis, no pallor. 
NECK:  Carotid arteries, bilateral normal upstroke, no bruits, no thrills. 
JUGULAR VEINS:  No jugular venous pressure or distention. 
CERVICAL LYMPH NODES:  Nontender, nonenlarged.  
THYROID:  Not enlarged.  Nontender.  No nodules. 
LUNGS:  Respiratory effort, unlabored. 
CHEST:  Normal curvature.  No thoracic deformity.  No chest wall tenderness. 
Percussion, resonant.  Auscultation, clear.  No wheezes, no rales, no rhonchi. 
CARDIOVASCULAR:  Precordial exam, nondisplaced.  No heaves or pericardial
thrills.  Rate and rhythm, regular.  Heart sounds, normal S1, normal S2.  No S3,
no gallop, no rub.  Systolic murmur, not heard.  Diastolic murmur, not heard. 
EXTREMITIES:  No cyanosis, no edema.  Peripheral pulses, full and equal in all
extremities, except as noted.  No bruits appreciated. 
ABDOMEN:  Soft, nondistended.  Normal aorta.  No bruit.  Nontender.  No masses. 
Liver, nontender, no hepatomegaly.  Spleen, nontender, no splenomegaly.  
MUSCULOSKELETAL:  No joint tenderness.  No joint swelling.  No erythema.  
NEUROLOGICAL:  Normal gait, normal strength, normal tone.
SKIN:  Warm and dry.
 
OVERALL IMPRESSION:  Elevated troponin, non-Q-wave myocardial infarction.  EKG
changes in a patient with severe advanced coronary artery disease and end-stage
renal failure, most likely there is a high likelihood he has recurrent
hemodynamically significant disease.  We will proceed with repeat coronary
angiography.  Further care depends upon findings of the angiography.
 
TRANSINT:[id number] Voice Confirmation ID: [id number] DOCUMENT ID: [id number]
 
 
 
 
HISTORY AND PHYSICAL                           [id number]    BROOK CABELLO JEFFREY MD             
 
 
 
 
 
 
 
 
 
 
 
 
 
 
 
 
 
 
 
 
 
 
 
 
 
 
 
 
 
 
 
 
 
 
 
 
 
 
 
 
 
 
 
 
 
CC:                                                             [id number]
DICTATION DATE: 19     :     19 09      ADM IN  
                                                                              
[facility]                                          
[address] [id number]

## 2019-07-22 NOTE — NUR
[facility]  Procedure History and Physical Update          Patient Name: Vicenta Phillips  MRN:    [id number]  YOB: 1943  Date of evaluation:  2019    Procedure:    flutter ablation was conscious sedation and CARTO      Indication for procedure:  Symptomatic atrial flutter       Please refer to the office note completed by Dr. Angélica Hardin on 2019 in the medical record and note that:    [x] I have examined the patient and reviewed the H&P/Consult and there are no changes to be made to the assessment or plan. [] I have examined the patient and reviewed the H&P/Consult and have noted the following changes:         Past Medical History:   Diagnosis Date    Anticoagulated on Coumadin     Atrial flutter (HCC)     Bladder disorder     Neurogenic    Cancer ([facility] [address]     basal cell to lt. side of nose.  Cellulitis of leg     JACQUELYN. LEGS MORE TO LEFT.  Depression     DJD (degenerative joint disease)     DVT (deep venous thrombosis) ([facility] [address]     after PICC INSERTION TO RT. AXILLA.  Dyslipidemia     Fibromyalgia     Full dentures     FULL ON TOP ,PARTIAL ON BOTTOM.  LEOPOLDO (generalized anxiety disorder)     Gout     Headache(784.0)     migraine    [place] (hard of hearing)     Hx of blood clots     Hypertension     Infection     PT. STATES \" I'M REAL SUSCEPTIBLE TO INFECTION. \"    Lymphedema     Obesity     Psoriasis     Renal insufficiency     Thrush     h/o    Urinary incontinence     stress    UTI (urinary tract infection)     HX. OF.    Vitamin D deficiency     Wears glasses        Past Surgical History:   Procedure Laterality Date    ABDOMEN SURGERY      ABLATION OF DYSRHYTHMIC FOCUS  2019    ATRIAL FLUTTER    CARDIOVERSION  2015    CATARACT REMOVAL Bilateral     IOL'S    CATARACT REMOVAL WITH IMPLANT Bilateral      SECTION      X3    CHOLECYSTECTOMY  10/8/15    lap [place]    ERCP  10/10/15    w/photos; sphincterotomy; placement of 10Fr - 9mm PT REFUSES TO WEAR SCDs CBD stent    EYE SURGERY      FRACTURE SURGERY Left     FOOT    JOINT REPLACEMENT      biltat TKA    KNEE FUSION      left WITH ROLF IN PLACE.  REMOVAL FOREIGN BODY EAR  04/2019    infection    SKIN BIOPSY      LT. SIDE OF NOSE.  TRANSESOPHAGEAL ECHOCARDIOGRAM  12/2015    UPPER GASTROINTESTINAL ENDOSCOPY  3/30/2016    stent removal       Family History   Problem Relation Age of Onset    Heart Disease Other     High Blood Pressure Other     Stroke Other     High Blood Pressure Father     Heart Disease Father     Stroke Father        Allergies   Allergen Reactions    Morphine     Pcn [Penicillins]     Sulfa Antibiotics     Ceftriaxone        Prior to Admission medications    Medication Sig Start Date End Date Taking? Authorizing Provider   busPIRone (BUSPAR) 15 MG tablet TAKE 1 TABLET TWICE A DAY 4/29/19  Yes SCOT Waldron CNP   citalopram (CELEXA) 40 MG tablet TAKE 1 TABLET DAILY 4/29/19  Yes SCOT Waldron CNP   warfarin (COUMADIN) 4 MG tablet Take 2 mg by mouth daily   Yes Historical Provider, MD   Cephalexin 500 MG TABS Take 1 capsule by mouth 4 times daily FOR FLARE UP OF CELLULITIS 12/11/18  Yes Lucian Ramirez MD   ciprofloxacin (CIPRO) 500 MG tablet Take 1 tablet by mouth 2 times daily Dr Adry White. Dis. 12/11/18  Yes Lucian Ramirez MD   allopurinol (ZYLOPRIM) 300 MG tablet Take 1 tablet by mouth daily 12/11/18  Yes Lucian Ramirez MD   atenolol (TENORMIN) 25 MG tablet Take 1 tablet by mouth daily 12/11/18  Yes Lucian Ramirez MD   furosemide (LASIX) 80 MG tablet TAKE 1 TABLET DAILY 8/29/18  Yes Lucian Ramirez MD   dicyclomine (BENTYL) 20 MG tablet TAKE 1 TABLET EVERY 6 HOURS 8/27/18  Yes Lucian Ramirez MD   vitamin B-12 (CYANOCOBALAMIN) 1000 MCG tablet Take 1,000 mcg by mouth daily   Yes Historical Provider, MD   vitamin D (CHOLECALCIFEROL) 1000 UNIT TABS tablet Take 1,000 Units by mouth daily   Yes Historical Provider, MD   Misc.  Devices KIT 1 each by Does

## 2019-07-23 VITALS — SYSTOLIC BLOOD PRESSURE: 157 MMHG | DIASTOLIC BLOOD PRESSURE: 83 MMHG

## 2019-07-23 VITALS — SYSTOLIC BLOOD PRESSURE: 136 MMHG | DIASTOLIC BLOOD PRESSURE: 76 MMHG

## 2019-07-23 VITALS — DIASTOLIC BLOOD PRESSURE: 78 MMHG | SYSTOLIC BLOOD PRESSURE: 137 MMHG

## 2019-07-23 PROCEDURE — 4A023N7 MEASUREMENT OF CARDIAC SAMPLING AND PRESSURE, LEFT HEART, PERCUTANEOUS APPROACH: ICD-10-PCS | Performed by: INTERNAL MEDICINE

## 2019-07-23 PROCEDURE — B2181ZZ FLUOROSCOPY OF LEFT INTERNAL MAMMARY BYPASS GRAFT USING LOW OSMOLAR CONTRAST: ICD-10-PCS | Performed by: INTERNAL MEDICINE

## 2019-07-23 PROCEDURE — B2111ZZ FLUOROSCOPY OF MULTIPLE CORONARY ARTERIES USING LOW OSMOLAR CONTRAST: ICD-10-PCS | Performed by: INTERNAL MEDICINE

## 2019-07-23 PROCEDURE — 027034Z DILATION OF CORONARY ARTERY, ONE ARTERY WITH DRUG-ELUTING INTRALUMINAL DEVICE, PERCUTANEOUS APPROACH: ICD-10-PCS | Performed by: INTERNAL MEDICINE

## 2019-07-23 PROCEDURE — B2121ZZ FLUOROSCOPY OF SINGLE CORONARY ARTERY BYPASS GRAFT USING LOW OSMOLAR CONTRAST: ICD-10-PCS | Performed by: INTERNAL MEDICINE

## 2019-07-23 PROCEDURE — B2151ZZ FLUOROSCOPY OF LEFT HEART USING LOW OSMOLAR CONTRAST: ICD-10-PCS | Performed by: INTERNAL MEDICINE

## 2019-07-23 NOTE — NUR
RESUMING PATIENT CARE. PATIENT IS ALERT AND ORIENTED. RESPIRATIONS ARE EVEN
AND UNLABORED. NO S/S OF DISTRESS. NO C/O PAIN. CALL LIGHT WITHIN REACH. WILL
CPOC.

## 2019-07-23 NOTE — NUR
BACK FROM CATH LAB.  B/P 197/106 REPORTED TO MARCO HERMAN.  RIGHT GROIN STABLE
WITHOUT BLEEDING OR HEMATOMA NOTED.  WILL MONITOR.

## 2019-07-23 NOTE — MORECARE
CASE MANAGEMENT DISCHARGE SUMMARY
 
 
PATIENT: BROOK CABELLO                       UNIT: [id number]
ACCOUNT#: [id number]                       ADM DATE: 19
AGE: 68     : [birth year]  SEX: M            ROOM/BED: D.2116    
AUTHOR: MARCELLE ASTUDILLO                             PHYSICIAN:                               
 
REFERRING PHYSICIAN: MARIA ESTHER STERN MD             
DATE OF SERVICE: 19
Discharge Plan
 
 
Patient Name: BROOK CABELLO
Facility: [facility]:[place]
Encounter #: [id number]
Medical Record #: [id number]
: 1950
Planned Disposition: Home
Anticipated Discharge Date: 19
 
Discharge Date: 
Expected LOS: 1
Initial Reviewer: [email or web address]
Initial Review Date: 2019
Generated: 19   2:27 pm 
 DCPIA - Discharge Planning Initial Assessment
 
Updated by [email or web address]: Moises Berg on 19   1:24 pm
*  Is the patient Alert and Oriented?
Yes
*  How many steps to enter\exit or inside your home? NONE *  PCP DR. MICHELLE REEVES *  Pharmacy
[facility] [place]
*  Preadmission Environment
Home with Family
*  ADLs
Partial Dependent
*  Partial ADLs (Assistance needed)
Medication Management
*  Equipment
Rolling Walker
*  Other Equipment
NO MEDICAL EQUIPMENT PROVIDER PREFERENCE
*  List name and contact numbers for known caregivers / representatives who 
currently or will assist patient after discharge:
MERARI CABELLO, , [phone]
*  Verbal permission to speak to the caregivers and representatives has been 
obtained from the patient.
N/A
*  Community resources currently utilized
 
None
*  Please name any agencies selected above.
BIBE
*  Additional services required to return to the preadmission environment?
No
*  Can the patient safely return to the preadmission environment?
Yes
*  Has this patient been hospitalized within the prior 30 days at any 
hospital?
No
 
 
 
 
 
 
Patient Name: BROOK CABELLO
Encounter #: [id number]
Page [id number]
 
 
 
 
 
Electronically Signed by MARCELLE ASTUDILLO on 19 at 1327
 
 
 
 
 
 
**All edits/amendments must be made on the electronic document**
 
DICTATION DATE: 197     : CANDIE  197     
RPT#: [id number]                                DC DATE:        
                                               STATUS: ADM IN  
[facility]
[address] [place] [address]
***END OF REPORT***

## 2019-07-23 NOTE — MORECARE
CASE MANAGEMENT DISCHARGE SUMMARY
 
 
PATIENT: BROOK CABELLO                       UNIT: [id number]
ACCOUNT#: [id number]                       ADM DATE: 19
AGE: 68     : 46  SEX: M            ROOM/BED: D.2116    
AUTHOR: MARCELLE ASTUDILLO                             PHYSICIAN:                               
 
REFERRING PHYSICIAN: MARIA ESTHER STERN MD             
DATE OF SERVICE: 19
Discharge Plan
 
 
Patient Name: BROOK CABELLO
Facility: [facility]:[place]
Encounter #: [id number]
Medical Record #: [id number]
: 1950
Planned Disposition: Home
Anticipated Discharge Date: 19
 
Discharge Date: 
Expected LOS: 1
Initial Reviewer: [email or web address]
Initial Review Date: 2019
Generated: 19   2:34 pm 
Comments
 
DCP- Discharge Planning
 
Updated by [email or web address]: Moises Berg on 19  12:28 pm CT
Patient Name: BROOK CABELLO                                     
Admission Status: Elective   
Accout number: [id number]                              
Admission Date: 2019   
: 1950                                                        
Admission Diagnosis:   
Attending: JOSELIN STERN                                                
Current LOS:  1   
  
Anticipated DC Date: 2019   
Planned Disposition: Home   
Primary Insurance: MEDICARE A & B   
  
  
Discharge Planning Comments:   
CM MET WITH PT IN ROOM TO DISCUSS DISCHARGE PLANNING AND NEEDS. PT REPORTS 
LIVING ATHIS  HOME DEPENDENTLY ON HIS SISTER WHO MANAGES MEDICATIONS AND 
WATCHES PT WHEN HE GOES TO THE BATHROOM TO MAKE SURE HE DOES NOT FALL AGAIN.  
PT STATES HIS SISTER HAS BEEN STAYING WITH HIM FOR MOST OF THE TIME.  PT'S 
 
COUSIN ASSISTS PT WITH GETTING INTO AND OUT OF THE BATH TUB.   PT HAS A 
ROLLING WALKER WITH NO MEDICAL EQUIPMENT PROVIDER PREFERENCE.  PT HAS NO 
OUTSIDE SERVICES ASSISTING IN THE HOME. CM DISCUSSED AVAILABILITY OF HOME 
HEALTH, REHAB SERVICES AND MEDICAL EQUIPMENT. PT DENIES DISCHARGE NEEDS,
REPORTS HOME HEALTH FINISHED ABOUT 2 WEEKS AGO.  PT REPORTS HIS SISTER WILL 
PICK HIM UP FOR DISCHARGE HOME, HE WILL JUST NEED TO CALL HER WHEN HE IS 
DISCHARGED.  
  
PT PLANS TO DISCHARGE HOME WITH FAMILY ASSISTANCE.  PT HAS NO ANTICIPATED 
DISCHARGE NEEDS, FAMILY TO TRANSPORT HOME.  CM TO FOLLOW AND ASSIST IF NEEDED.
  
  
: Moises Berg
 
 DCPIA - Discharge Planning Initial Assessment
 
Updated by [email or web address]: Moises Berg on 19   1:24 pm
*  Is the patient Alert and Oriented?
Yes
*  How many steps to enter\exit or inside your home? NONE *  PCP DR. MICHELLE REEVES *  Pharmacy
[facility] IN [place]
*  Preadmission Environment
Home with Family
*  ADLs
Partial Dependent
*  Partial ADLs (Assistance needed)
Medication Management
*  Equipment
Rolling Walker
*  Other Equipment
NO MEDICAL EQUIPMENT PROVIDER PREFERENCE
*  List name and contact numbers for known caregivers / representatives who 
currently or will assist patient after discharge:
MERARI CABELLO, SISTER, [phone]
*  Verbal permission to speak to the caregivers and representatives has been 
obtained from the patient.
N/A
*  Community resources currently utilized
None
*  Please name any agencies selected above.
BIBE
*  Additional services required to return to the preadmission environment?
No
*  Can the patient safely return to the preadmission environment?
Yes
*  Has this patient been hospitalized within the prior 30 days at any 
hospital?
No
 
 
 
 
 
 
 
 
Last DP export: 19  12:27 p
Patient Name: BROOK CABELLO
Encounter #: [id number]
Page [id number]
 
 
 
 
 
Electronically Signed by MARCELLE ASTUDILLO on 19 at 1334
 
 
 
 
 
 
**All edits/amendments must be made on the electronic document**
 
DICTATION DATE: 19     : CANDIE  19     
RPT#: [id number]                                DC DATE:        
                                               STATUS: ADM IN  
[facility]
[address] [place] [address]
***END OF REPORT***

## 2019-07-23 NOTE — NUR
IV AND TELEMETRY DCD.  DC PLANS GIVEN.  UNDERSTANDING VOICED.  FAMILY TO PICK
UP IN AM.  UNABLE TO GET RIDE HOME THIS PM.

## 2019-07-24 VITALS — DIASTOLIC BLOOD PRESSURE: 66 MMHG | SYSTOLIC BLOOD PRESSURE: 133 MMHG

## 2019-07-24 VITALS — SYSTOLIC BLOOD PRESSURE: 127 MMHG | DIASTOLIC BLOOD PRESSURE: 73 MMHG

## 2019-07-24 LAB
ANION GAP SERPL CALC-SCNC: 13.6 MMOL/L (ref 8–16)
BASOPHILS NFR BLD AUTO: 0 % (ref 0–2)
BUN SERPL-MCNC: 52 MG/DL (ref 7–18)
CALCIUM SERPL-MCNC: 7.7 MG/DL (ref 8.5–10.1)
CHLORIDE SERPL-SCNC: 97 MMOL/L (ref 98–107)
CO2 SERPL-SCNC: 28.6 MMOL/L (ref 21–32)
CREAT SERPL-MCNC: 9.3 MG/DL (ref 0.6–1.3)
EOSINOPHIL NFR BLD: 2.1 % (ref 0–7)
ERYTHROCYTE [DISTWIDTH] IN BLOOD BY AUTOMATED COUNT: 15.3 % (ref 11.5–14.5)
GLUCOSE SERPL-MCNC: 75 MG/DL (ref 74–106)
HCT VFR BLD CALC: 37 % (ref 42–54)
HGB BLD-MCNC: 12.3 G/DL (ref 13.5–17.5)
IMM GRANULOCYTES NFR BLD: 0.4 % (ref 0–5)
LYMPHOCYTES NFR BLD AUTO: 22.3 % (ref 15–50)
MCH RBC QN AUTO: 29.9 PG (ref 26–34)
MCHC RBC AUTO-ENTMCNC: 33.2 G/DL (ref 31–37)
MCV RBC: 90 FL (ref 80–100)
MONOCYTES NFR BLD: 13.4 % (ref 2–11)
NEUTROPHILS NFR BLD AUTO: 61.8 % (ref 40–80)
OSMOLALITY SERPL CALC.SUM OF ELEC: 282 MOSM/KG (ref 275–300)
PLATELET # BLD: 147 10X3/UL (ref 130–400)
PMV BLD AUTO: 10.9 FL (ref 7.4–10.4)
POTASSIUM SERPL-SCNC: 4.2 MMOL/L (ref 3.5–5.1)
RBC # BLD AUTO: 4.11 10X6/UL (ref 4.2–6.1)
SODIUM SERPL-SCNC: 135 MMOL/L (ref 136–145)
WBC # BLD AUTO: 4.9 10X3/UL (ref 4.8–10.8)

## 2019-11-04 ENCOUNTER — HOSPITAL ENCOUNTER (INPATIENT)
Dept: HOSPITAL 84 - OBSVTIME | Age: 69
LOS: 5 days | Discharge: HOME | DRG: 246 | End: 2019-11-09
Attending: INTERNAL MEDICINE | Admitting: INTERNAL MEDICINE
Payer: MEDICARE

## 2019-11-04 VITALS — DIASTOLIC BLOOD PRESSURE: 91 MMHG | SYSTOLIC BLOOD PRESSURE: 151 MMHG

## 2019-11-04 VITALS — DIASTOLIC BLOOD PRESSURE: 74 MMHG | SYSTOLIC BLOOD PRESSURE: 140 MMHG

## 2019-11-04 VITALS
WEIGHT: 141.3 LBS | BODY MASS INDEX: 20.23 KG/M2 | HEIGHT: 70 IN | BODY MASS INDEX: 20.23 KG/M2 | WEIGHT: 141.3 LBS | HEIGHT: 70 IN | BODY MASS INDEX: 20.23 KG/M2

## 2019-11-04 VITALS — SYSTOLIC BLOOD PRESSURE: 149 MMHG | DIASTOLIC BLOOD PRESSURE: 86 MMHG

## 2019-11-04 VITALS — SYSTOLIC BLOOD PRESSURE: 176 MMHG | DIASTOLIC BLOOD PRESSURE: 86 MMHG

## 2019-11-04 VITALS — DIASTOLIC BLOOD PRESSURE: 71 MMHG | SYSTOLIC BLOOD PRESSURE: 111 MMHG

## 2019-11-04 VITALS — DIASTOLIC BLOOD PRESSURE: 66 MMHG | SYSTOLIC BLOOD PRESSURE: 147 MMHG

## 2019-11-04 DIAGNOSIS — D63.1: ICD-10-CM

## 2019-11-04 DIAGNOSIS — R42: ICD-10-CM

## 2019-11-04 DIAGNOSIS — H92.01: ICD-10-CM

## 2019-11-04 DIAGNOSIS — N18.6: ICD-10-CM

## 2019-11-04 DIAGNOSIS — I12.0: ICD-10-CM

## 2019-11-04 DIAGNOSIS — E78.5: ICD-10-CM

## 2019-11-04 DIAGNOSIS — I21.4: Primary | ICD-10-CM

## 2019-11-04 DIAGNOSIS — I25.10: ICD-10-CM

## 2019-11-04 LAB
ANION GAP SERPL CALC-SCNC: 17.5 MMOL/L (ref 8–16)
APTT BLD: 29.2 SECONDS (ref 22.8–39.4)
BUN SERPL-MCNC: 69 MG/DL (ref 7–18)
CALCIUM SERPL-MCNC: 8.4 MG/DL (ref 8.5–10.1)
CHLORIDE SERPL-SCNC: 102 MMOL/L (ref 98–107)
CK MB SERPL-MCNC: 0.9 U/L (ref 0–3.6)
CK MB SERPL-MCNC: 1 U/L (ref 0–3.6)
CK MB SERPL-MCNC: 1.4 U/L (ref 0–3.6)
CK MB SERPL-MCNC: 1.4 U/L (ref 0–3.6)
CK SERPL-CCNC: 30 UL (ref 21–232)
CK SERPL-CCNC: 30 UL (ref 21–232)
CK SERPL-CCNC: 36 UL (ref 21–232)
CK SERPL-CCNC: 41 UL (ref 21–232)
CO2 SERPL-SCNC: 25.7 MMOL/L (ref 21–32)
CREAT SERPL-MCNC: 10.6 MG/DL (ref 0.6–1.3)
GLUCOSE SERPL-MCNC: 96 MG/DL (ref 74–106)
INR PPP: 1.24 (ref 0.85–1.17)
OSMOLALITY SERPL CALC.SUM OF ELEC: 298 MOSM/KG (ref 275–300)
POTASSIUM SERPL-SCNC: 5.2 MMOL/L (ref 3.5–5.1)
PROTHROMBIN TIME: 15.1 SECONDS (ref 11.6–15)
SODIUM SERPL-SCNC: 140 MMOL/L (ref 136–145)
TROPONIN I SERPL-MCNC: 0.08 NG/ML (ref 0–0.06)
TROPONIN I SERPL-MCNC: 0.08 NG/ML (ref 0–0.06)
TROPONIN I SERPL-MCNC: 0.09 NG/ML (ref 0–0.06)
TROPONIN I SERPL-MCNC: 0.09 NG/ML (ref 0–0.06)

## 2019-11-04 PROCEDURE — 4A033BC MEASUREMENT OF ARTERIAL PRESSURE, CORONARY, PERCUTANEOUS APPROACH: ICD-10-PCS | Performed by: INTERNAL MEDICINE

## 2019-11-04 PROCEDURE — B2181ZZ FLUOROSCOPY OF LEFT INTERNAL MAMMARY BYPASS GRAFT USING LOW OSMOLAR CONTRAST: ICD-10-PCS | Performed by: INTERNAL MEDICINE

## 2019-11-04 PROCEDURE — B2151ZZ FLUOROSCOPY OF LEFT HEART USING LOW OSMOLAR CONTRAST: ICD-10-PCS | Performed by: INTERNAL MEDICINE

## 2019-11-04 PROCEDURE — B2111ZZ FLUOROSCOPY OF MULTIPLE CORONARY ARTERIES USING LOW OSMOLAR CONTRAST: ICD-10-PCS | Performed by: INTERNAL MEDICINE

## 2019-11-04 PROCEDURE — 4A023N7 MEASUREMENT OF CARDIAC SAMPLING AND PRESSURE, LEFT HEART, PERCUTANEOUS APPROACH: ICD-10-PCS | Performed by: INTERNAL MEDICINE

## 2019-11-04 PROCEDURE — B2121ZZ FLUOROSCOPY OF SINGLE CORONARY ARTERY BYPASS GRAFT USING LOW OSMOLAR CONTRAST: ICD-10-PCS | Performed by: INTERNAL MEDICINE

## 2019-11-04 PROCEDURE — 027034Z DILATION OF CORONARY ARTERY, ONE ARTERY WITH DRUG-ELUTING INTRALUMINAL DEVICE, PERCUTANEOUS APPROACH: ICD-10-PCS | Performed by: INTERNAL MEDICINE

## 2019-11-04 NOTE — HEMODYNAMI
PATIENT:BROOK CABELLO                                    MEDICAL RECORD: [id number]
: 46                                            LOCATION:[facility].213
ACCT# [id number]                                       ADMISSION DATE: 19
 
 
 Generatedon:201914:52
Patient name: BROOK CABELLO Patient #: [id number] Visit #: [id number] SSN: 
: 1950 Date
of study: 2019
Page: Of
Hemodynamic Procedure Report
****************************
Patient Data
Patient Demographics
Procedure consent was obtained
First Name: BROOK           Gender: Male
Last Name: DESTINEY          : 1950
Patient #: [id number]       Age: 68 year(s)
Visit #: [id number]       Race: Black
Accession #:
[id number]
Additional ID: [id number]
Contact details
Address: [address] [place] [address]    Phone: [phone]
[address]
State: [place]
City: [place]
Zip code: [address]
Admission
Admission Data
Admission Date: 2019   Admission Time: 3:22
Room #: 2131
Procedure
Procedure Types
Cath Procedure
Diagnostic Procedure
LHC
LHC w/Coronaries w/Grafts
FFR/IVUS
FFR Initial
Sedation Charges
Moderate Sedation up to 30 minutes
PCI Procedure
AMI/SVG/ PTCA or Stent
SVG-BMS/YOCASTA Initial
Procedure Description
Procedure Date
Procedure Date: 2019
Procedure Start Time: 14:26
Procedure End Time: 14:50
Procedure Staff
Name                            Function
Trevor Bhandari MD                Performing Physician
Mariela Lloyd RT                    Monitor
Luisana Colon RT               Scrub
Heidi Stein RN                Nurse
Procedure Data
Cath Procedure
Fluoroscopy
 
Diagnostic fluoroscopy      Total fluoroscopy Time: 5.3
time: 5.3 min               min
Diagnostic fluoroscopy      Total fluoroscopy dose: 566
dose: 566 mGy               mGy
Contrast Material
Contrast Material Type                       Amount (ml)
Isovue 300                                   85
Entry Location
Entry     Primary  Successful  Side  Size  Upsize Upsize Entry    Closure Succes
sful  Closure
Location                             (Fr)  1 (Fr) 2 (Fr) Remarks  Device        
      Remarks
Femoral                        Right 5 Fr  6 Fr                   Exoseal
artery                                     Short
Estimated blood loss: 5 ml
Diagnostic catheters
Device Type               Used For           End Catheter
Placement
MULTIPACK Pigtail 5 Fr    LV Angiography
catheter
DIAGNOSTIC JL 5 5Fr       Left Coronary
catheter ([id number])        Angiography
DIAGNOSTIC IM 5Fr         Multi-vessel
catheter (167020G)        Angiography
Procedure Complications
No complications
Procedure Medications
Medication           Administration Route Dosage
0.9% NaCl            I.V.
Oxygen               etCO2 Nasal cannula  2 l/min
Lidocaine 2%         added to field       20
Heparin Flush Bag    added to field       2 bags
(1000units/500ml NS)
Versed               I.V.                 2 mg
Fentanyl             I.V.                 50 mcg
Heparin Bolus        I.V.                 4000 units
Hemodynamics
Rest
Heart Rate: 63 (bpm)
Pressure Samples
Time     Site     Value (mmHg) Purpose      Heart      Use
Rate(bpm)
14:28    LV       82/2,2       Snapshot     60
Snapshots
Pre Cath      Intra         NCS           Post Cath
Vital Signs
Time     Heart  Resp   SPO2 etCO2   NIBP       Rhythm  Pain    Sedation
Rate   (ipm)  (%)  (mmHg)  (mmHg)             Status  Level
(bpm)
13:57:07 64     13     100  29.2    121/73(91) NSR     0 (11)  10(A)
, No
pain
14:01:15 62     14     100  34.5    127/67(98) NSR     0 (11)  10(A)
, No
pain
14:05:29 59     10     100  19.5    105/60(80) SB      0 (11)  10(A)
, No
pain
14:09:31 58     8      100  35.2    108/70(86) SB      0 (11)  9(A)
, No
 
pain
14:13:36 61     8      100  26.2    110/63(86) NSR     0 (11)  9(A)
, No
pain
14:17:42 60     9      100  28.5    103/63(92) NSR     0 (11)  9(A)
, No
pain
14:21:48 62     13     100  32.9    107/60(82) NSR     0 (11)  9(A)
, No
pain
14:25:52 59     9      100  31.4    110/67(82) SB      0 (11)  9(A)
, No
pain
14:29:56 60     8      100  35.2    111/68(84) NSR     0 (11)  9(A)
, No
pain
14:34:03 57     18     100  33.7    113/62(89) SB      0 (11)  9(A)
, No
pain
14:38:09 56     10     100  35.2    114/65(90) SB      0 (11)  9(A)
, No
pain
14:42:17 57     12     100  34.5    111/63(83) SB      0 (11)  10(A)
, No
pain
14:46:23 55     9      100  33      108/68(86) SB      0 (11)  10(A)
, No
pain
Medications
Time     Medication       Route   Dose  Verified Delivered Reason          Notes
    Effectiveness
by       by
13:56:06 0.9% NaCl        I.V.    kvo   Trevor  Heidi     used for
Elisabet Stein   procedure
RN
13:56:13 Oxygen           etCO2   2     Trevor  Heidi     used for
Nasal   l/min Elisabet Stein   procedure
cannula                RN
13:56:20 Lidocaine 2%     added   20ml  Trevordaisha Rodneyrey   for local
to      vial  Elisabet Bhandari MD  anesthetic
field
13:56:24 Heparin Flush    added   2     Trevor  Trevor   used for
Bag              to      bags  Elisabet Bhandari MD  procedure
(1000units/500ml field
NS)
14:04:30 Versed           I.V.    2 mg  Trevor  Heidi     for sedation
Elisabet Stein
RN
14:04:41 Fentanyl         I.V.    50    Trevor  Heidi     for sedation
mcg   Elisabet Stein
RN
14:39:21 Heparin Bolus    I.V.    4000  Trevor  Heidi     for             verif
ied
units Elisabet Stein   anticoagulation with Dr. LUIS ALFREDO Bhandari
Procedure Log
Time     Note
13:40:46 Luisana Colon RT(R) sent for patient. Start room use.
13:51:44 Procedure Status Urgent Heart Cath (IP).
13:51:58 Time tracking: Regular hours (M-F 7:00 - 5:00)
 
13:52:02 Plan of Care:Hemodynamics will remain stable., Cardiac
rhythm will remain stable., Comfort level will be
maintained., Respiratory function will remain
adequate., Patient/ family verbilizes understanding of
procedure., Procedure tolerated without complication.,
Recovers from procedure without complications..
13:52:06 Patient received from Med II to CCL 2 Alert and
oriented. Tansferred to table in Supine position.
13:52:08 Signed procedure consent form obtained from patient.
13:52:09 Warm blankets applied, and monica hugger turned on for
patient comfort.
13:52:10 Correct patient and procedure confirmed by team.
13:52:11 ECG and BP/O2 sat monitors applied to patient.
13:55:55 Vital chart was started
13:56:06 0.9% NaCl kvo I.V. was administered by Heidi Stein RN; used for procedure; Verbal order read back and
verified.
13:56:13 Oxygen 2 l/min etCO2 Nasal cannula was administered by
Heidi Stein RN; used for procedure; Verbal order
read back and verified.
13:56:20 Lidocaine 2% 20ml vial added to field was administered
by Trevor Bhandari MD; for local anesthetic; Verbal
order read back and verified.
13:56:24 Heparin Flush Bag (1000units/500ml NS) 2 bags added to
field was administered by Trevor Bhandari MD; used for
procedure; Verbal order read back and verified.
13:58:33 Baseline sample Acquired.
13:58:37 Rhythm: sinus rhythm
13:58:39 Full Disclosure recording started
13:58:42 H&P Date Dictated: 2019 New H&P dictated by
physician..
13:58:43 Pre-procedure instructions explained to patient.
13:58:43 Pre-op teaching completed and patient verbalized
understanding.
13:58:45 Family in patients room.
13:58:47 Patient NPO since Midnight.
13:58:49 Is the patient allergic to Iodine/contrast media? No.
13:58:50 Was the patient premedicated? No
13:58:51 Is patient on blood thinner?No
13:58:52 Patient diabetic? No.
13:58:55 Previous problem with sedation/anesthesia? No ?
13:58:56 Snore? Yes
13:58:57 Sleep apnea? No
13:58:58 Deviated septum? No
13:58:59 Opens mouth fully? Yes
13:59:00 Sticks out tongue? Yes
13:59:04 Airway obstruction? No ?
13:59:09 Dentures? No ?
13:59:12 Pre procedure: right dorsailis pedis pulse 1+
Palpable, but thready & weak; easily obliterated
13:59:15 Pre procedure: left dorsailis pedis pulse 1+ Palpable,
but thready & weak; easily obliterated
13:59:17 Patient pain scale 0/10 ?.
13:59:36 IV patent on arrival in right hand with 0.9% NaCl at
KVO.
13:59:39 Lab results completed and on chart.
13:59:46 Stress Test: no; N/A ?
14:00:00 Risk of Mortality: ?
14:01:45 Risk of Mortality: 4.8
14:01:49 Risk of blood transfusion: 4.6
 
14:01:53 Risk of JSOE M: 17.7
14:02:10 Right groin area was prepped with chlora-prep and
draped in sterile fashion
14:02:11 Alarms reviewed by R. N.
14:02:11 Sharps counted by scrub and verified by R.N.
14:02:13 Physician arrived
14:02:13 --------ALL STOP TIME OUT------
14:02:14 Final Timeout: patient, procedure, and site verified
with staff and physician. All members of the team are
in agreement.
14:02:15 Right groin site verified by team.
14:02:18 Fire Safety Assessment: A--An alcohol-based skin
anteseptic being used preoperatively., C--Open oxygen
or nitrous oxide is being used., D--An ESU, laser, or
fiber-optic light is being used.
14:02:20 Physical assessment completed. ASA score P 2 - A
patient with mild systemic disease as per Trevor Bhandari MD.
14:02:24 5) <15 or on dialysis Very severe, or end stage kidney
failure.
14:03:30 Maximum allowable contrast dose (3.7 X eGFR X
0.75)16.65 ml.
14:03:34 Sedation plan: IV Moderate Sedation Medication:Versed,
Fentanyl
14:04:17 Use device set Femoral Dx
14:04:18 ACIST Syringe (59612) opened to sterile field.
14:04:19 Bag Decanter (S) opened to sterile field.
14:04:19 Medline Cath Pack ([id number]) opened to sterile field.
14:04:20 ACIST Hand Control (47953) opened to sterile field.
14:04:21 ACIST Manifold (84944) opened to sterile field.
14:04:21 DIAGNOSTIC Multipack 5Fr catheter set ([id number]) opened
to sterile field.
14:04:21 Tegaderm 4 x 4 (1626W) opened to sterile field.
14:04:22 SHEATH 5FR [place] (CBY181) opened to sterile field.
14:04:23 EMERALD Guide Wire (581-940) opened to sterile field.
14:04:30 Versed 2 mg I.V. was administered by Heidi Stein RN;
for sedation; Verbal order read back and verified.
14:04:41 Fentanyl 50 mcg I.V. was administered by Heidi Stein
RN; for sedation; Verbal order read back and verified.
14:23:20 Procedure started.
14:26:44 Local anesthetic to right femoral artery with
Lidocaine 2% by Trevor Bhandari MD.**INITIAL ACCESS
ONLY**
14:26:54 A 5 Fr sheath was inserted into the Right Femoral
artery
14:27:36 A MULTIPACK Pigtail 5 Fr catheter was advanced over
the wire and used for LV Angiography.
14::41 Zero performed for pressure channel P1
14::41 LV hemodynamics recorded.
14::42 LV gram done using MORE
14::44 Injector settings: Ml/sec: 5, Volume: 15,
14:28:51 EF : 50 %
14:29:29 Catheter removed.
14:29:47 A DIAGNOSTIC JL 5 5Fr catheter ([id number]) was advanced
over the wire and used for Left Coronary Angiography.
14:29:56 LCA angiography performed.
14:29:59 Injector settings: Ml/sec: 3, Volume: 6,
14:30:01 Catheter removed.
14:30:12 A DIAGNOSTIC IM 5Fr catheter (900574P) was advanced
over the wire and used for Multi-vessel Angiography.
 
14:30:22 LIMA angiography performed.
14:31:46 SVG to Circ angiography performed.
14:33:11 GUIDE 6FR AR 2.0 catheter ([id number]) opened to sterile
field.
14:33:12 SHEATH 6FR [place] (IKV635) opened to sterile field.
14:33:15 INFLATOR Merit BasixCompak (VT0726) opened to sterile
field.
14:35:07 **ACC**Dominant side:Left
14:35:08 Catheter removed.
14:35:09 Proceeding to intervention.
14:35:16 Sheath upsized to a 6 Fr Short.
14:35:24 6 Fr ar 2 guide catheter was inserted over the wire
14:35:29 FFR/IFR wire advanced.
14:35:31 Baseline FFR 1.
14:37:43 mCirc lesion measured at 0.46 with IFR
14:39:21 Heparin Bolus 4000 units I.V. was administered by
Heidi Stein RN; for anticoagulation; verified with
Dr. Bhandari Verbal order read back and verified.
14:39:22 **ACC** Pre-intervention SINGH Flow is 3.
14:39:22 Pre PCI Site: Vein Graft mCirc has 70% stenosis.
14:39:40 Place stent Inflation Number: 1 A ANTONIO RX 4.0 x 12
stent ([id number]) was prepped and advanced across
the Aorta Left -> Mid CX 70. The stent was deployed at
15 AMANDA for 0:10 (min:sec) 0.
14:39:46 Inflation number: 2 The stent balloon was then
re-inflated across the Aorta Left -> Mid CX 0 to 15
AMANDA for 0:10 (min:sec) .
14:39:52 Inflation number: 3 The stent balloon was then
re-inflated across the Aorta Left -> Mid CX to 17 AMANDA
for 0:10 (min:sec) .
14:40:47 Stent catheter was removed intact over wire.
14:41:03 mCirc lesion measured at 0.91 with IFR
14:41:52 Post PCI Site: Vein Graft mCirc has 0% stenosis.
14:41:55 **ACC** Post-intervention SINGH Flow is 3.
14:43:32 ACT drawn and resulted at 335 seconds. (normal
therapeutic range 180-240 seconds).
14:43:34 Wire removed.
14:43:34 Guide catheter removed.
14:43:42 EXOSEAL 6Fr () opened to sterile field.
14:43:58 Sheath removed intact; hemostasis achieved with
Exoseal to the Right Femoral artery.
14:44:02 Procedure ended.(Physican Out)
14:44:59 Fluoroscopy time 05.30 minutes.
14:45:04 Fluoroscopy dose: 566 mGy
14:45:04 Flurop Dose total: 566
14:45:20 Dose Area Product 64569 mGy/cm.
14:45:27 Contrast amount:Isovue 300 85ml.
14:45:30 Maximum allowable dose exceeded? Yes.
14:45:30 Sharps counted by scrub and verified by R.N.
14:45:32 Insertion/operative site no bleeding no hematoma.
14:45:35 Post-op/insertion site Right Femoral artery dressed
using a 4 x 4 and Tegaderm.
14:45:38 Post procedure rhythm: unchanged.
14:45:41 Estimated blood loss: 5 ml
14:45:42 Post procedure instruction explained to
patient.Patient verbalizes understanding.
14:45:42 Patient needs reinforcement of post procedure
teaching.
14:46:06 Procedure type changed to Cath procedure, Diagnostic
procedure, LHC, [facility] w/Coronaries w/Grafts, FFR/IVUS,
 
FFR Initial, Sedation Charges, Moderate Sedation up to
30 minutes, PCI procedure, AMI/SVG/ PTCA or Stent,
SVG-BMS/YOCASTA Initial
14:46:08 Procedure and supply charges have been captured,
reviewed, submitted and are correct.
14:46:12 Procedure Complication : No complications
14:46:15 Vital chart was stopped
14:46:19 [facility] Findings: MVD- PCI performed (see procedure note)
14:46:22 Operative report dictated upon procedure completion.
14:46:22 See physician's report for complete and final results.
14:46:24 Report given to [facility] II.
14:46:28 Patient transfered to Med II with Stretcher.
14:50:47 Procedure ended.
14:50:47 Full Disclosure recording stopped
14:50:57 **ACC-PCI Only** Patient was given prescriptions, or
instructed by Trevor Bhandari MD to start/continue the
following medications upon discharge: Plavix
14:50:58 End room use (Document Last)
14:51:18 End room use (Document Last)
14:51:45 End room use (Document Last)
Intervention Summary
Intervention Notes
Time     ActionType  Lesion and  Equipment Used Action#  Pressure  Duration
Attributes
14:39:40 Place stent Aorta Left  ANTONIO RX 4.0 x  1        15        00:10
-> Mid CX   12 stent
([id number])
14:39:46 Reinflate   Aorta Left  ANTONIO RX 4.0 x  2        15        00:10
stent       -> Mid CX   12 stent
balloon                 ([id number])
14:39:52 Reinflate   Aorta Left  ANTONIO RX 4.0 x  3        17        00:10
stent       -> Mid CX   12 stent
balloon                 ([id number])
Device Usage
Item Name      Manufacture  Quantity  Catalog       Hospital Part     Current Mi
nimal Lot# /
Number        Charge   Number   Stock   Stock   Serial#
Code
ACIST Syringe  Acist        1         62257         439339   234790   492836  20
(55210)        NanoTune
Bag Decanter   Microtek     1         S         590881   03501    095703  5
(S)        Medical Inc.
Medline Cath   Medline      1         [id number]     320256   12592    498464  5
Pack
([id number])
ACIST Hand     Acist        1         47170         419422   047624   159937  5
Control        Medical
(49042)        Systems Inc
ACIST Manifold Acist        1         84592         463686   352417   919246  5
(00294)        Medical
Systems Inc
DIAGNOSTIC     Cardinal     1         [id number]        517568   04584    484097  30
Multipack 5Fr  Health
catheter set
(YT4783)
Tegaderm 4 x 4 3M           1         1626W         943834   748743   682939  5
(1626W)
SHEATH 5FR     Terumo       1         VJX674        591551   849106   053063  5
[place]
 
(FLC567)
EMERALD Guide  Cardinal     1         502-455       647740   416928   559355  5
Wire (502-455) Health
MULTIPACK      Cardinal     1                                         153081  5
Pigtail 5 Fr   Health
catheter
DIAGNOSTIC JL  Cardinal     1         421528O       991390   549194   805909  5
5 5Fr catheter Health
(822823T)
DIAGNOSTIC IM  Cardinal     1         600912C       666267   851659   838328  5
5Fr catheter   Health
(863194K)
GUIDE 6FR AR   Medtronic    1         [id number]       242300   85227    564799  1
2.0 catheter
([id number])
SHEATH 6FR     Terumo       1         FHY461        485382   315466   073171  40
[place]
(BXT274)
INFLATOR Merit Merit        1         ZX1535        033402   397597   110011  15
[facility]
(VR1525)
ANTONIO RX 4.0 x  Medtronic    1         [id number]  786092   9046965  192352  5 
      [id number]
12 stent
([id number])
EXOSEAL 6Fr    Cardinal     1                  089147   037029   685537  10
()        Health
Signature Audit [place]
Stage           Time        Signature      Unsigned
Intra-Procedure 2019   Mariela Lloyd
2:51:18 PM  RT(R)
Intra-Procedure 2019   Heidi Stein
2:51:45 PM  RN
Intra-Procedure 2019   Trevor Bhandari
2:52:05 PM  MD
Signatures
Performing Physician :  Signature :
Trevor Bhandari MD        ______________________________
Date : ______________ Time :
______________
Monitor : Mariela Gabino RT   Signature :
______________________________
Date : ______________ Time :
______________
Nurse : Heidi Emil RN Signature :
______________________________
Date : ______________ Time :
______________
 
 
 
 
 
 
 
[address] [place] [address]

## 2019-11-04 NOTE — NUR
ALERT AND ORIENTED X4. LAYING IN BED. CONSENTS FOR CATH LAB SIGNED ON CHART.
PRE-OP COMPLETE. VERIFY MAYE CABELLO CAN RECIEVE INFORMATION PER PATIENT.
SINUS RYTHM ON TELEMETRY. DENIES ANY NEEDS. CONTINUE PLAN OF CARE AND SAFETY
PRECAUTIONS.

## 2019-11-04 NOTE — NUR
DIALYSIS COORDINATOR: VALERIA [facility] MWF @ 10:00. MET WITH
PATIENT BEDSIDE IN ROOM. RECORDS TO HOME UNIT. ADELSO HERNANDEZ.

## 2019-11-04 NOTE — NUR
PT ARRIVED W/24 GA IV IN RIGHT HAND.  NOTED ON ATTEMPTING TO FLUSH IV WAS NOT
PATENT.  DC'D W/DRESSING APPLIED.

## 2019-11-04 NOTE — NUR
RECIEVED REPORT FROM DEB IN ER. ARRIVED TO FLOOR IN W/C. TRANSFERED SELF TO
BED WITH ASSIST. ALERT AND ORIENTED X4. ABLE TO ANSWER SOME QUESTIONS. DOES
NOT KNOW HIS MEDICATION. SAID HIS SISTER GIVES THEM TO HIM. REQUESTERED A COPY
OF MEDICATION LIST AND SAID HE WOULD TELL HER TO BRING ONE. DENIES ANY CP AT
THIS TIME. NO TELEMETRY AVAILABLE. DENIES ANY NEEDS AT THIS TIME.

## 2019-11-04 NOTE — NUR
ARRIVE BACK TO ROOM VIA BED FROM CATH LAB. RT GROIN DRESSING CLEAN DRY INTACT.
FREE FROM BLEEDING. FREE FROM HEMATOMA. BP-95/54, HR-71. PULSE PALPABLE
BILATERALLY. DENIES ANY NEEDS AT THIS TIME. CONTINUE PLAN OF CARE AND SAFETY
PRECAUTIONS.

## 2019-11-05 VITALS — DIASTOLIC BLOOD PRESSURE: 63 MMHG | SYSTOLIC BLOOD PRESSURE: 111 MMHG

## 2019-11-05 VITALS — SYSTOLIC BLOOD PRESSURE: 104 MMHG | DIASTOLIC BLOOD PRESSURE: 60 MMHG

## 2019-11-05 VITALS — DIASTOLIC BLOOD PRESSURE: 59 MMHG | SYSTOLIC BLOOD PRESSURE: 112 MMHG

## 2019-11-05 VITALS — DIASTOLIC BLOOD PRESSURE: 60 MMHG | SYSTOLIC BLOOD PRESSURE: 115 MMHG

## 2019-11-05 VITALS — SYSTOLIC BLOOD PRESSURE: 102 MMHG | DIASTOLIC BLOOD PRESSURE: 55 MMHG

## 2019-11-05 VITALS — SYSTOLIC BLOOD PRESSURE: 114 MMHG | DIASTOLIC BLOOD PRESSURE: 70 MMHG

## 2019-11-05 LAB
ANION GAP SERPL CALC-SCNC: 16.4 MMOL/L (ref 8–16)
BASOPHILS NFR BLD AUTO: 0.5 % (ref 0–2)
BUN SERPL-MCNC: 53 MG/DL (ref 7–18)
CALCIUM SERPL-MCNC: 8.1 MG/DL (ref 8.5–10.1)
CHLORIDE SERPL-SCNC: 103 MMOL/L (ref 98–107)
CO2 SERPL-SCNC: 28.1 MMOL/L (ref 21–32)
CREAT SERPL-MCNC: 9.1 MG/DL (ref 0.6–1.3)
EOSINOPHIL NFR BLD: 2.4 % (ref 0–7)
ERYTHROCYTE [DISTWIDTH] IN BLOOD BY AUTOMATED COUNT: 15.9 % (ref 11.5–14.5)
GLUCOSE SERPL-MCNC: 60 MG/DL (ref 74–106)
HCT VFR BLD CALC: 32.9 % (ref 42–54)
HGB BLD-MCNC: 10.3 G/DL (ref 13.5–17.5)
IMM GRANULOCYTES NFR BLD: 0.2 % (ref 0–5)
LYMPHOCYTES NFR BLD AUTO: 24.2 % (ref 15–50)
MCH RBC QN AUTO: 29.3 PG (ref 26–34)
MCHC RBC AUTO-ENTMCNC: 31.3 G/DL (ref 31–37)
MCV RBC: 93.7 FL (ref 80–100)
MONOCYTES NFR BLD: 13.3 % (ref 2–11)
NEUTROPHILS NFR BLD AUTO: 59.4 % (ref 40–80)
OSMOLALITY SERPL CALC.SUM OF ELEC: 296 MOSM/KG (ref 275–300)
PLATELET # BLD: 161 10X3/UL (ref 130–400)
PMV BLD AUTO: 11.4 FL (ref 7.4–10.4)
POTASSIUM SERPL-SCNC: 4.5 MMOL/L (ref 3.5–5.1)
RBC # BLD AUTO: 3.51 10X6/UL (ref 4.2–6.1)
SODIUM SERPL-SCNC: 143 MMOL/L (ref 136–145)
WBC # BLD AUTO: 4.2 10X3/UL (ref 4.8–10.8)

## 2019-11-05 NOTE — NUR
EVENING ROUNDS COMPLETE. PT LAYING IN BED. AAOX4. NO SIGNS OF DISTRESS. CL IN
REACH, BED IN LOWEST POSITION.

## 2019-11-05 NOTE — NUR
ALERT AND ORIENTED X4. SITTING UP IN BED. DENIES PAIN OR SOB. DENIES ANY NEEDS
AT THIS TIME. CONTINUE PLAN OF CARE AND SAFETY PRECAUTIONS.

## 2019-11-05 NOTE — NUR
RECIEVE REPORT. ALERT AND ORIENTED X4. LAYING IN BED. SINUS RYTHM ON
TELEMETRY. DENIES ANY NEEDS AT THIS TIME. CONTINUE PLAN OF CARE AND SAFETY
PRECAUTIONS.

## 2019-11-06 VITALS — DIASTOLIC BLOOD PRESSURE: 62 MMHG | SYSTOLIC BLOOD PRESSURE: 120 MMHG

## 2019-11-06 VITALS — DIASTOLIC BLOOD PRESSURE: 72 MMHG | SYSTOLIC BLOOD PRESSURE: 128 MMHG

## 2019-11-06 VITALS — DIASTOLIC BLOOD PRESSURE: 69 MMHG | SYSTOLIC BLOOD PRESSURE: 128 MMHG

## 2019-11-06 VITALS — DIASTOLIC BLOOD PRESSURE: 69 MMHG | SYSTOLIC BLOOD PRESSURE: 116 MMHG

## 2019-11-06 VITALS — SYSTOLIC BLOOD PRESSURE: 128 MMHG | DIASTOLIC BLOOD PRESSURE: 65 MMHG

## 2019-11-06 VITALS — DIASTOLIC BLOOD PRESSURE: 65 MMHG | SYSTOLIC BLOOD PRESSURE: 119 MMHG

## 2019-11-06 LAB
ANION GAP SERPL CALC-SCNC: 19.5 MMOL/L (ref 8–16)
BASOPHILS NFR BLD AUTO: 0.2 % (ref 0–2)
BUN SERPL-MCNC: 79 MG/DL (ref 7–18)
CALCIUM SERPL-MCNC: 7.5 MG/DL (ref 8.5–10.1)
CHLORIDE SERPL-SCNC: 100 MMOL/L (ref 98–107)
CO2 SERPL-SCNC: 25.1 MMOL/L (ref 21–32)
CREAT SERPL-MCNC: 11.3 MG/DL (ref 0.6–1.3)
EOSINOPHIL NFR BLD: 2.2 % (ref 0–7)
ERYTHROCYTE [DISTWIDTH] IN BLOOD BY AUTOMATED COUNT: 15.8 % (ref 11.5–14.5)
GLUCOSE SERPL-MCNC: 76 MG/DL (ref 74–106)
HCT VFR BLD CALC: 32.1 % (ref 42–54)
HGB BLD-MCNC: 10.2 G/DL (ref 13.5–17.5)
IMM GRANULOCYTES NFR BLD: 0.2 % (ref 0–5)
LYMPHOCYTES NFR BLD AUTO: 22.6 % (ref 15–50)
MCH RBC QN AUTO: 29.6 PG (ref 26–34)
MCHC RBC AUTO-ENTMCNC: 31.8 G/DL (ref 31–37)
MCV RBC: 93 FL (ref 80–100)
MONOCYTES NFR BLD: 13.1 % (ref 2–11)
NEUTROPHILS NFR BLD AUTO: 61.7 % (ref 40–80)
OSMOLALITY SERPL CALC.SUM OF ELEC: 301 MOSM/KG (ref 275–300)
PHOSPHATE SERPL-MCNC: 10.2 MG/DL (ref 2.5–4.9)
PLATELET # BLD: 141 10X3/UL (ref 130–400)
PMV BLD AUTO: 10.7 FL (ref 7.4–10.4)
POTASSIUM SERPL-SCNC: 4.6 MMOL/L (ref 3.5–5.1)
RBC # BLD AUTO: 3.45 10X6/UL (ref 4.2–6.1)
SODIUM SERPL-SCNC: 140 MMOL/L (ref 136–145)
WBC # BLD AUTO: 4.5 10X3/UL (ref 4.8–10.8)

## 2019-11-06 NOTE — CN
PATIENT NAME:BROOK CABELLO                                 MEDICAL RECORD: [id number]
: 46                                              LOCATION:D. D.2131
ADMIT DATE: 19                                       ACCOUNT: [id number]
CONSULTING PHYSICIAN:    MARIA ESTHER STERN MD             
                                               
REFERRING PHYSICIAN:     ELIF AGRAWAL MD       
 
 
DATE OF CONSULTATION:  2019
 
DIAGNOSES:
1.  Non-Q-wave myocardial infarction.
2.  Coronary artery disease.
3.  Previous cardiac stent.
4.  Hypertension.
5.  Hyperlipidemia.
6.  End-stage renal failure, dialysis.
 
HISTORY OF PRESENT ILLNESS:  Mr. Cabello presents with chest pain, chest
discomfort compatible with angina for the past week.  It is a dull aching
heaviness across the anterior chest, worse if he exerts himself.  It has now
progressed to episodes at rest.  He is having 4/10 chest pain while I am
speaking with him.  He does have a history of coronary artery disease.  Last
cardiac intervention was in 2018.  He has a history of end-stage
renal failure on dialysis, hypertension, hyperlipidemia.
 
PHYSICAL EXAMINATION:
CONSTITUTIONAL/GENERAL APPEARANCE:  Well nourished, well developed, appears
stated age.
EYES:  Lids and conjunctivae noninjected.  No discharge.  No pallor.
ENT:  Lips within normal limit.  No cyanosis.  No pallor.
NECK:  Carotid arteries, bilateral normal upstroke.  No bruits.  No thrills.  No
jugular venous pressure or distention.
CERVICAL LYMPH NODES:  Nontender.  Nonenlarged.
THYROID:  Not enlarged.  No nodules.
CARDIOVASCULAR:  Precordial exam, nondisplaced.  No heaves or pericardial
thrills.  Rate and rhythm, regular.  Heart sounds, normal S1, normal S2.  No S3,
no gallop, no rub.  Systolic murmur, not heard.  Diastolic murmur, not heard.
RESPIRATORY:  Respiratory effort, unlabored.  Normal curvature.  No thoracic
deformity.  No chest wall tenderness.  Percussion, resonant.  Auscultation,
clear.  No wheezes, no rales, no rhonchi.
ABDOMEN:  Soft, nondistended, nontender.  No abdominal pain, no vomiting and
normal appetite.
MUSCULOSKELETAL:  No joint tenderness, normal gait, normal tone.
SKIN:  Warm and dry.
 
OVERALL IMPRESSION:  Chest pain, non-Q-wave myocardial infarction, continued
pain.  At this time, we will not increase his beta blockade as his heart rate is
in the 60s.  We will place him on a long-acting nitrate as well as a calcium
channel blocker and proceed with coronary angiography.
 
TRANSINT:[id number] Voice Confirmation ID: [id number] DOCUMENT ID: [id number]
 
 
 
CONSULT REPORT                                 [id number]    BROOK CABELLO, MARIA ESTHER LEE             
 
 
 
Electronically Signed by MARIA ESTHER STERN on 19 at 1407
 
 
 
 
 
 
 
 
 
 
 
 
 
 
 
 
 
 
 
 
 
 
 
 
 
 
 
 
 
 
 
 
 
 
 
 
 
 
 
 
 
CC:                                                             [phone]
DICTATION DATE: 19     :     19 0936      ADM IN  
                                                                              
[place] [id number] [place] [id number]

## 2019-11-06 NOTE — NUR
PT RESTING WITH EYES CLOSED. AROUSES TO VOICE AND IS ALERT AND ORIENTED X 4.
DEIES PAIN OR NEEDS. THE BED IS LOW AND CALL LIGHT IS WITHIN REACH.

## 2019-11-06 NOTE — OP
PATIENT NAME:  BROOK CABELLO                             MEDICAL RECORD: [id number]
:46                                             LOCATION:D.M2     D.2131
                                                         ADMISSION DATE:19
SURGEON:  MARIA ESTHER STERN MD             
 
 
DATE OF OPERATION:  2019
 
PROCEDURES:
1.  PTCA stent vein graft to left circumflex.
2.  IFR.
3.  Left heart catheterization.
4.  Selective coronary angiography.
5.  Left ventriculogram.
6.  Vein graft angiography.
7.  LIMA angiography.
 
INDICATION:  Non-Q-wave myocardial infarction.
 
PROCEDURE IN DETAIL:  After informed consent was obtained and after a detailed
description of risks, benefits as well as alternative therapies, the patient
elected to proceed with angiogram and angioplasty.  The right femoral area was
prepped and draped in normal sterile fashion.  Right femoral artery was
cannulated via modified Seldinger technique with placement of 6-Luxembourgish sheath. 
All catheters exchanged through this sheath.
 
FINDINGS:  Left ventriculogram was performed in standard 30-degree MORE view,
reveals good cardiac wall motion, ejection fraction 50%.
 
SELECTIVE CORONARY ANGIOGRAPHY:
1.  Left main is with no significant angiographic disease.
2.  Left anterior descending is totally occluded.
3.  LIMA to the LAD is widely patent.
4.  Left circumflex is totally occluded.
5.  Vein graft to circumflex is patent.  There are previously placed stents. 
There is greater than 70% in-stent restenosis proximally.  This is a change from
previous angiography.  IFR is abnormal.
6.  The right coronary artery has previously placed stents.  This is totally
occluded.  This has not changed from previous angiography.
 
PTCA STENT OF LEFT CIRCUMFLEX VEIN GRAFT:  Stent used was a 4.0 x 12 mm [place]. 
Result was 0% residual stenosis.  An IFR normalized after the procedure.
 
OVERALL IMPRESSION:  Successful percutaneous transluminal coronary angioplasty
stent of the vein graft to the left circumflex going from greater than 70%
initial stenosis with an abnormal IFR to 0% residual stenosis and normalization
of the IFR after the procedure.
 
TRANSINT:[id number] Voice Confirmation ID: [id number] DOCUMENT ID: [id number]
 
 
 
OPERATIVE REPORT                               [id number]    BROOK CABELLO, MARIA ESTHER LEE             
 
 
 
Electronically Signed by MARIA ESTHER STERN on 19 at 1408
 
 
 
 
 
 
 
 
 
 
 
 
 
 
 
 
 
 
 
 
 
 
 
 
 
 
 
 
 
 
 
 
 
 
 
 
 
 
 
 
 
CC:                                                             [id number]
DICTATION DATE: 19 1449     :     19 0022      [facility] [place]  
                                                                              
[place] [id number] [facility] [place] [address]

## 2019-11-06 NOTE — NUR
PT SITTING UP IN BED WATCHING TV. EMPTIED 1500ML OF LIGHT PINK URINE FROM
CHEW BAG. VSS. DENIES PAIN OR NEEDS. BED IS LOW AND CALL LIGHT IN REACH.

## 2019-11-07 VITALS — DIASTOLIC BLOOD PRESSURE: 72 MMHG | SYSTOLIC BLOOD PRESSURE: 126 MMHG

## 2019-11-07 VITALS — SYSTOLIC BLOOD PRESSURE: 120 MMHG | DIASTOLIC BLOOD PRESSURE: 69 MMHG

## 2019-11-07 VITALS — DIASTOLIC BLOOD PRESSURE: 68 MMHG | SYSTOLIC BLOOD PRESSURE: 102 MMHG

## 2019-11-07 VITALS — SYSTOLIC BLOOD PRESSURE: 121 MMHG | DIASTOLIC BLOOD PRESSURE: 66 MMHG

## 2019-11-07 VITALS — SYSTOLIC BLOOD PRESSURE: 108 MMHG | DIASTOLIC BLOOD PRESSURE: 72 MMHG

## 2019-11-07 VITALS — SYSTOLIC BLOOD PRESSURE: 116 MMHG | DIASTOLIC BLOOD PRESSURE: 68 MMHG

## 2019-11-07 LAB
ANION GAP SERPL CALC-SCNC: 13.8 MMOL/L (ref 8–16)
BASOPHILS NFR BLD AUTO: 0.2 % (ref 0–2)
BUN SERPL-MCNC: 43 MG/DL (ref 7–18)
CALCIUM SERPL-MCNC: 7.9 MG/DL (ref 8.5–10.1)
CHLORIDE SERPL-SCNC: 97 MMOL/L (ref 98–107)
CO2 SERPL-SCNC: 29.8 MMOL/L (ref 21–32)
CREAT SERPL-MCNC: 7.7 MG/DL (ref 0.6–1.3)
EOSINOPHIL NFR BLD: 2.4 % (ref 0–7)
ERYTHROCYTE [DISTWIDTH] IN BLOOD BY AUTOMATED COUNT: 15.5 % (ref 11.5–14.5)
GLUCOSE SERPL-MCNC: 80 MG/DL (ref 74–106)
HCT VFR BLD CALC: 33 % (ref 42–54)
HGB BLD-MCNC: 10.7 G/DL (ref 13.5–17.5)
IMM GRANULOCYTES NFR BLD: 0.2 % (ref 0–5)
LYMPHOCYTES NFR BLD AUTO: 22.1 % (ref 15–50)
MCH RBC QN AUTO: 29.7 PG (ref 26–34)
MCHC RBC AUTO-ENTMCNC: 32.4 G/DL (ref 31–37)
MCV RBC: 91.7 FL (ref 80–100)
MONOCYTES NFR BLD: 15.1 % (ref 2–11)
NEUTROPHILS NFR BLD AUTO: 60 % (ref 40–80)
OSMOLALITY SERPL CALC.SUM OF ELEC: 283 MOSM/KG (ref 275–300)
PHOSPHATE SERPL-MCNC: 7.6 MG/DL (ref 2.5–4.9)
PLATELET # BLD: 141 10X3/UL (ref 130–400)
PMV BLD AUTO: 10.7 FL (ref 7.4–10.4)
POTASSIUM SERPL-SCNC: 3.6 MMOL/L (ref 3.5–5.1)
RBC # BLD AUTO: 3.6 10X6/UL (ref 4.2–6.1)
SODIUM SERPL-SCNC: 137 MMOL/L (ref 136–145)
WBC # BLD AUTO: 4.3 10X3/UL (ref 4.8–10.8)

## 2019-11-07 NOTE — NUR
Nutrition Follow-up:
Ate well with good appetite yesterday (11/6). Noted pt vomited breakfast this
AM.
Diet: Renal
Wt: 152#
Last BM: 11/6
Labs noted: K+ 3.6, Ca 7.9, PO4 7.6
Meds reviewed
-Continue current diet as tolerated.
-Offer Nepro with meals.
-Rec PO4 binder with meals; noted Renvela on home med list.
-RD following.

## 2019-11-07 NOTE — MORECARE
CASE MANAGEMENT DISCHARGE SUMMARY
 
 
PATIENT: BROOK CABELLO                       UNIT: [id number]
ACCOUNT#: [id number]                       ADM DATE: 19
AGE: 68     : [birth year]  SEX: M            ROOM/BED: D.2131    
AUTHOR: MARCELLE ASTUDILLO                             PHYSICIAN:                               
 
REFERRING PHYSICIAN: ELIF AGRAWAL MD       
DATE OF SERVICE: 19
Discharge Plan
 
 
Patient Name: BROOK CABELLO
Facility: [facility]:[place]
Encounter #: [id number]
Medical Record #: [id number]
: 1950
Planned Disposition: Home
Anticipated Discharge Date: 19
 
Discharge Date: 
Expected LOS: 3
Initial Reviewer: [email or web address]
Initial Review Date: 2019
Generated: 19   5:50 pm 
 DCPIA - Discharge Planning Initial Assessment
 
Updated by [id number]: Moises Berg on 19   4:44 pm
*  Is the patient Alert and Oriented?
Yes
*  How many steps to enter\exit or inside your home?
 
*  PCP
DR. REEVES IN [place]
*  Pharmacy
[facility] IN [place]
*  Preadmission Environment
Home with Family
*  ADLs
Partial Dependent
*  Partial ADLs (Assistance needed)
Medication Management
*  Equipment
Walker
*  Other Equipment
NO MEDICAL EQUIPMENT PROVIDER PREFERENCE
*  List name and contact numbers for known caregivers / representatives who 
currently or will assist patient after discharge:
MERARI "CARI" SISTER CABELLO, [phone]
 
*  Verbal permission to speak to the caregivers and representatives has been 
obtained from the patient.
N/A
*  Community resources currently utilized
Other
*  Please name any agencies selected above.
OUTPATIENT DIAYSIS, [facility] DIALYSIS IN [place], MWF, 1000AM, SISTER 
DRIVES
*  Additional services required to return to the preadmission environment?
No
*  Can the patient safely return to the preadmission environment?
Yes
*  Has this patient been hospitalized within the prior 30 days at any 
hospital?
No
 
 
 
 
 
Coverage Notice
 
Reviewer: [id number] - Moises Berg
 
Notice Issued Date-Time: 2019   9:25
Notice Type: IM Discharge Notice
 
Notice Delivered To: Patient
Relationship to Patient: 
Representative Name: 
 
Delivery Method: HAND - Hand Delivered
Marla Days:
Prior Verbal Notification: 
 
Recipient Understood Notice: Yes
Recipient Signature: Yes
Med Rec Note Co-signed by Attending:
 
Coverage Notice Comment:  
Patient Name: BROOK CABELLO
 
Encounter #: [id number]
Page [id number]
 
 
 
 
 
Electronically Signed by MARCELLE ASTUDILLO on 19 at 1650
 
 
 
 
 
 
**All edits/amendments must be made on the electronic document**
 
DICTATION DATE: 19     : CANDIE  19     
RPT#: [id number]                                DC DATE:        
                                               STATUS: ADM IN  
[facility]
 [place] [address]
***END OF REPORT***

## 2019-11-07 NOTE — MORECARE
CASE MANAGEMENT DISCHARGE SUMMARY
 
 
PATIENT: BROOK CABELLO                       UNIT: [id number]
ACCOUNT#: [id number]                       ADM DATE: 19
AGE: 68     : 46  SEX: M            ROOM/BED: D.2131    
AUTHOR: WILDADOC                             PHYSICIAN:                               
 
REFERRING PHYSICIAN: ELIF AGRAWAL MD       
DATE OF SERVICE: 19
Discharge Plan
 
 
Patient Name: BROOK CABELLO
Facility: [facility]:[place]
Encounter #: [id number]
Medical Record #: [id number]
: 1950
Planned Disposition: Home
Anticipated Discharge Date: 19
 
Discharge Date: 
Expected LOS: 3
Initial Reviewer: [email or web address]
Initial Review Date: 2019
Generated: 19   6:02 pm 
Comments
 
DCP- Discharge Planning
 
Updated by [id number]: Moises Berg on 19   3:51 pm CT
Patient Name: BROOK CABELLO                                     
Admission Status: ER   
Accout number: [id number]                              
Admission Date: 2019   
: 1950                                                        
Admission Diagnosis:   
Attending: ELIF AGRAWAL                                               
Current LOS:  3   
  
Anticipated DC Date: 2019   
Planned Disposition: Home   
Primary Insurance: MEDICARE A & B   
  
  
Discharge Planning Comments:   
CM MET WITH PT IN ROOM TO DISCUSS DISCHARGE PLANNING AND NEEDS. PT REPORTS 
LIVING AT HOME INDEPENDENTLY; PT'S SISTER IS LIVING WITH HIM TO ASSIST WITH 
MEDICATIONS AND TRANSPORTATION.   PT HAS A WALKER WITH NO MEDICAL EQUIPMENT 
PROVIDER PREFERENCE.  PT HAS NO OUTSIDE SERVICES ASSISTING IN THE HOME. PT'S 
 
SISTER DRIVES PT TO AND FROM DIALYSIS IN [place] ON MWF, 1000AM SCHEDULE.  CM 
DISCUSSED AVAILABILITY OF HOME HEALTH, REHAB SERVICES AND MEDICAL EQUIPMENT. 
PT DENIES DISCHARGE NEEDS, REPORTS HIS DAUGHTER'S BOYFRIEND WILL PICK HIM UP 
FOR DISCHARGE HOME.  IMPORTANT MESSAGE FROM MEDICARE PROVIDED AND EXPALINED. 
  
PT PLANS TO DISCHARGE HOME WITH FAMILY ASSISTANCE, DENIES NEEDS AT THIS TIME. 
 FAMILY TO TRANSPORT HOME.  CM TO FOLLOW AND ASSIST IF NEEDED.  
  
: Moises Berg
 DCPIA - Discharge Planning Initial Assessment
 
Updated by [id number]: Moises Berg on 19   4:44 pm
*  Is the patient Alert and Oriented?
Yes
*  How many steps to enter\exit or inside your home?
 
*  PCP
DR. REEVES IN [place]
*  Pharmacy
[facility] IN [place]
*  Preadmission Environment
Home with Family
*  ADLs
Partial Dependent
*  Partial ADLs (Assistance needed)
Medication Management
*  Equipment
Walker
*  Other Equipment
NO MEDICAL EQUIPMENT PROVIDER PREFERENCE
*  List name and contact numbers for known caregivers / representatives who 
currently or will assist patient after discharge:
MERARI ROYAL" SISTER CABELLO, [phone]
*  Verbal permission to speak to the caregivers and representatives has been 
obtained from the patient.
N/A
*  Community resources currently utilized
Other
*  Please name any agencies selected above.
OUTPATIENT DIAYSIS, [facility] DIALYSIS IN [place], [facility], 1000AM, SISTER 
DRIVES
*  Additional services required to return to the preadmission environment?
No
*  Can the patient safely return to the preadmission environment?
Yes
*  Has this patient been hospitalized within the prior 30 days at any 
hospital?
No
 
 
 
 
 
 
Coverage Notice
 
Reviewer: [id number] Leon Berg
 
Notice Issued Date-Time: 2019   9:25
Notice Type: IM Discharge Notice
 
Notice Delivered To: Patient
Relationship to Patient: 
Representative Name: 
 
Delivery Method: HAND - Hand Delivered
Marla Days:
Prior Verbal Notification: 
 
Recipient Understood Notice: Yes
Recipient Signature: Yes
Med Rec Note Co-signed by Attending:
 
Coverage Notice Comment:  
 
Last DP export: 19   3:50 p
Patient Name: BROOK CABELLO
Encounter #: [id number]
Page [id number]
 
 
 
 
 
Electronically Signed by MARCELLE ASTUDILLO on 19 at 1702
 
 
 
 
 
 
**All edits/amendments must be made on the electronic document**
 
DICTATION DATE: 19     : CANDIE  19     
RPT#: [id number]                                DC DATE:        
                                               STATUS: ADM IN  
[facility]
[address] [place] [address]
***END OF REPORT***

## 2019-11-07 NOTE — NUR
BEDSIDE REPORT RECEIVED FROM DAY SHIFT, PT CARE ASSUMED. INTRODUCED SELF AND
WROTE NAME ON BOARD. PT SITTING IN BED, AAOX4. DENIES PAIN OR ANY NEEDS AT
THIS TIME. BED IN LOWEST POSITION, SR X2, CALL LIGHT WITHIN REACH. WILL
CONTINUE TO MONITOR.

## 2019-11-07 NOTE — NUR
REPORT RECEIVED. WILL CONTINUE WITH POC. PT CURRENTLY LYING ASLEEP ON LEFT
SIDE. CALL LIGHT W/I REACH. RR EVEN AND UNLABORED ON RA. R.HAND PIV IS SALINE
LOCKED. NO S/S OF DISTRESS NOTED. PT DENIES ANY NEEDS AT THIS TIME. WILL CTM.

## 2019-11-08 VITALS — SYSTOLIC BLOOD PRESSURE: 135 MMHG | DIASTOLIC BLOOD PRESSURE: 75 MMHG

## 2019-11-08 VITALS — SYSTOLIC BLOOD PRESSURE: 118 MMHG | DIASTOLIC BLOOD PRESSURE: 72 MMHG

## 2019-11-08 VITALS — SYSTOLIC BLOOD PRESSURE: 126 MMHG | DIASTOLIC BLOOD PRESSURE: 72 MMHG

## 2019-11-08 VITALS — SYSTOLIC BLOOD PRESSURE: 169 MMHG | DIASTOLIC BLOOD PRESSURE: 84 MMHG

## 2019-11-08 VITALS — DIASTOLIC BLOOD PRESSURE: 66 MMHG | SYSTOLIC BLOOD PRESSURE: 121 MMHG

## 2019-11-08 LAB
ANION GAP SERPL CALC-SCNC: 16.4 MMOL/L (ref 8–16)
BASOPHILS NFR BLD AUTO: 0.2 % (ref 0–2)
BUN SERPL-MCNC: 61 MG/DL (ref 7–18)
CALCIUM SERPL-MCNC: 8.2 MG/DL (ref 8.5–10.1)
CHLORIDE SERPL-SCNC: 97 MMOL/L (ref 98–107)
CO2 SERPL-SCNC: 28.6 MMOL/L (ref 21–32)
CREAT SERPL-MCNC: 10.1 MG/DL (ref 0.6–1.3)
EOSINOPHIL NFR BLD: 1.2 % (ref 0–7)
ERYTHROCYTE [DISTWIDTH] IN BLOOD BY AUTOMATED COUNT: 15.8 % (ref 11.5–14.5)
GLUCOSE SERPL-MCNC: 87 MG/DL (ref 74–106)
HCT VFR BLD CALC: 32.8 % (ref 42–54)
HGB BLD-MCNC: 10.4 G/DL (ref 13.5–17.5)
IMM GRANULOCYTES NFR BLD: 0.4 % (ref 0–5)
LYMPHOCYTES NFR BLD AUTO: 19 % (ref 15–50)
MCH RBC QN AUTO: 29.5 PG (ref 26–34)
MCHC RBC AUTO-ENTMCNC: 31.7 G/DL (ref 31–37)
MCV RBC: 92.9 FL (ref 80–100)
MONOCYTES NFR BLD: 18.2 % (ref 2–11)
NEUTROPHILS NFR BLD AUTO: 61 % (ref 40–80)
OSMOLALITY SERPL CALC.SUM OF ELEC: 291 MOSM/KG (ref 275–300)
PHOSPHATE SERPL-MCNC: 10 MG/DL (ref 2.5–4.9)
PLATELET # BLD: 152 10X3/UL (ref 130–400)
PMV BLD AUTO: 11.2 FL (ref 7.4–10.4)
POTASSIUM SERPL-SCNC: 4 MMOL/L (ref 3.5–5.1)
RBC # BLD AUTO: 3.53 10X6/UL (ref 4.2–6.1)
SODIUM SERPL-SCNC: 138 MMOL/L (ref 136–145)
WBC # BLD AUTO: 4.9 10X3/UL (ref 4.8–10.8)

## 2019-11-08 NOTE — NUR
RECEIVED REPORT, WILL ASSUME CARE OF PT, DENIES ANY NEEDS AT THIS TIME,
IV-R.HAND-SL, BED IS LOW, SRX2, CALL LIGHT IN REACH, WILL CONTINUE PLAN OF
CARE

## 2019-11-09 VITALS — SYSTOLIC BLOOD PRESSURE: 145 MMHG | DIASTOLIC BLOOD PRESSURE: 73 MMHG

## 2019-11-09 VITALS — DIASTOLIC BLOOD PRESSURE: 75 MMHG | SYSTOLIC BLOOD PRESSURE: 137 MMHG

## 2019-11-09 VITALS — DIASTOLIC BLOOD PRESSURE: 68 MMHG | SYSTOLIC BLOOD PRESSURE: 144 MMHG

## 2019-11-09 VITALS — DIASTOLIC BLOOD PRESSURE: 62 MMHG | SYSTOLIC BLOOD PRESSURE: 122 MMHG

## 2019-11-09 LAB
ANION GAP SERPL CALC-SCNC: 17.5 MMOL/L (ref 8–16)
BASOPHILS NFR BLD AUTO: 0.2 % (ref 0–2)
BUN SERPL-MCNC: 37 MG/DL (ref 7–18)
CALCIUM SERPL-MCNC: 8.9 MG/DL (ref 8.5–10.1)
CHLORIDE SERPL-SCNC: 98 MMOL/L (ref 98–107)
CO2 SERPL-SCNC: 28.3 MMOL/L (ref 21–32)
CREAT SERPL-MCNC: 7.4 MG/DL (ref 0.6–1.3)
EOSINOPHIL NFR BLD: 0.7 % (ref 0–7)
ERYTHROCYTE [DISTWIDTH] IN BLOOD BY AUTOMATED COUNT: 15.8 % (ref 11.5–14.5)
GLUCOSE SERPL-MCNC: 116 MG/DL (ref 74–106)
HCT VFR BLD CALC: 35 % (ref 42–54)
HGB BLD-MCNC: 11.1 G/DL (ref 13.5–17.5)
IMM GRANULOCYTES NFR BLD: 0.5 % (ref 0–5)
LYMPHOCYTES NFR BLD AUTO: 12.5 % (ref 15–50)
MCH RBC QN AUTO: 29.7 PG (ref 26–34)
MCHC RBC AUTO-ENTMCNC: 31.7 G/DL (ref 31–37)
MCV RBC: 93.6 FL (ref 80–100)
MONOCYTES NFR BLD: 12.5 % (ref 2–11)
NEUTROPHILS NFR BLD AUTO: 73.6 % (ref 40–80)
OSMOLALITY SERPL CALC.SUM OF ELEC: 288 MOSM/KG (ref 275–300)
PHOSPHATE SERPL-MCNC: 6.9 MG/DL (ref 2.5–4.9)
PLATELET # BLD: 173 10X3/UL (ref 130–400)
PMV BLD AUTO: 11.2 FL (ref 7.4–10.4)
POTASSIUM SERPL-SCNC: 3.8 MMOL/L (ref 3.5–5.1)
RBC # BLD AUTO: 3.74 10X6/UL (ref 4.2–6.1)
SODIUM SERPL-SCNC: 140 MMOL/L (ref 136–145)
WBC # BLD AUTO: 5.9 10X3/UL (ref 4.8–10.8)

## 2019-11-09 NOTE — MORECARE
CASE MANAGEMENT DISCHARGE SUMMARY
 
 
PATIENT: BROOK CABELLO                       UNIT: [id number]
ACCOUNT#: [id number]                       ADM DATE: 19
AGE: 68     : 46  SEX: M            ROOM/BED: D.2131    
AUTHOR: MARCELLE ASTUDILLO                             PHYSICIAN:                               
 
REFERRING PHYSICIAN: ELIF AGRAWAL MD       
DATE OF SERVICE: 19
Discharge Plan
 
 
Patient Name: BROOK CABELLO
Facility: [facility]:[place]
Encounter #: [id number]
Medical Record #: [id number]
: 1950
Planned Disposition: Home
Anticipated Discharge Date: 19
 
Discharge Date: 2019
Expected LOS: 3
Initial Reviewer: [email or web address]
Initial Review Date: 2019
Generated: 19   4:48 pm 
Comments
 
DCP- Discharge Planning
 
Updated by [id number]: Rebecca Rivera on 19  10:24 am CT
Patient Name:  BROOK CABELLO   
Encounter No:  [id number]   
:  1950   
Primary Insurance:  MEDICARE A & B  
Anticipated DC Date: 2019   
Planned Disposition:  Home  
External Planned Provider: :   
  
  
DCP follow-up note: Patient and family in agreement with discharge plan. No 
changes to plan. Case management will follow and assist as needed.  
Rebecca Rivera
DCP- Discharge Planning
 
Updated by [email or web address]: Moises Berg on 19   3:51 pm CT
Patient Name: BROOK CABELLO                                     
Admission Status: ER   
Accout number: [id number]                              
Admission Date: 2019   
 
: 1950                                                        
Admission Diagnosis:   
Attending: ELIF AGRAWAL                                               
Current LOS:  3   
  
Anticipated DC Date: 2019   
Planned Disposition: Home   
Primary Insurance: MEDICARE A & B   
  
  
Discharge Planning Comments:   
CM MET WITH PT IN ROOM TO DISCUSS DISCHARGE PLANNING AND NEEDS. PT REPORTS 
LIVING AT HOME INDEPENDENTLY; PT'S SISTER IS LIVING WITH HIM TO ASSIST WITH 
MEDICATIONS AND TRANSPORTATION.   PT HAS A WALKER WITH NO MEDICAL EQUIPMENT 
PROVIDER PREFERENCE.  PT HAS NO OUTSIDE SERVICES ASSISTING IN THE HOME. PT'S 
SISTER DRIVES PT TO AND FROM DIALYSIS IN [place] ON MWF, 1000AM SCHEDULE.  CM 
DISCUSSED AVAILABILITY OF HOME HEALTH, REHAB SERVICES AND MEDICAL EQUIPMENT. 
PT DENIES DISCHARGE NEEDS, REPORTS HIS DAUGHTER'S BOYFRIEND WILL PICK HIM UP 
FOR DISCHARGE HOME.  IMPORTANT MESSAGE FROM MEDICARE PROVIDED AND EXPALINED. 
  
PT PLANS TO DISCHARGE HOME WITH FAMILY ASSISTANCE, DENIES NEEDS AT THIS TIME. 
 FAMILY TO TRANSPORT HOME.  CM TO FOLLOW AND ASSIST IF NEEDED.  
  
: Moises Berg
 DCPIA - Discharge Planning Initial Assessment
 
Updated by ERIC: Moises Berg on 19   4:44 pm
*  Is the patient Alert and Oriented?
Yes
*  How many steps to enter\exit or inside your home?
 
*  PCP
DR. REEVES IN [place]
*  Pharmacy
[facility] IN [place]
*  Preadmission Environment
Home with Family
*  ADLs
Partial Dependent
*  Partial ADLs (Assistance needed)
Medication Management
*  Equipment
Walker
*  Other Equipment
NO MEDICAL EQUIPMENT PROVIDER PREFERENCE
*  List name and contact numbers for known caregivers / representatives who 
currently or will assist patient after discharge:
MERARI ROYAL" SISTER CABELLO, [phone]
*  Verbal permission to speak to the caregivers and representatives has been 
obtained from the patient.
N/A
*  Community resources currently utilized
 
Other
*  Please name any agencies selected above.
OUTPATIENT DIAYSIS, [facility] DIALYSIS IN [place], MWF, 1000AM, SISTER 
DRIVES
*  Additional services required to return to the preadmission environment?
No
*  Can the patient safely return to the preadmission environment?
Yes
*  Has this patient been hospitalized within the prior 30 days at any 
hospital?
No
 
 
 
 
 
Coverage Notice
 
Reviewer: [email or web address] - Moises Berg
 
Notice Issued Date-Time: 2019   9:25
Notice Type: IM Discharge Notice
 
Notice Delivered To: Patient
Relationship to Patient: 
Representative Name: 
 
Delivery Method: HAND - Hand Delivered
Marla Days:
Prior Verbal Notification: 
 
Recipient Understood Notice: Yes
Recipient Signature: Yes
Med Rec Note Co-signed by Attending:
 
Coverage Notice Comment:  
 
Last DP export: 19  10:27 a
Patient Name: BROOK CABELLO
 
Encounter #: [id number]
Page [id number]
 
 
 
 
 
Electronically Signed by MARCELLE [facility] on 19 at 1549
 
 
 
 
 
 
**All edits/amendments must be made on the electronic document**
 
DICTATION DATE: 19     : CANDIE  19     
RPT#: [id number]                                DC DATE:19
                                               STATUS: DIS IN  
[facility]
[address] [place] [address]
***END OF REPORT***

## 2019-11-09 NOTE — NUR
AM MEDS GIVEN AT THIS TIME. PT IN BED, DENIES ANY NEEDS AT THIS TIME. CALL
LIGHT IN REACH,NAD NOTED, WILL CONTINUE TO MONITOR.

## 2019-11-09 NOTE — NUR
PROVIDED VERBAL AND WRITTEN DISCHARGE TEACHING TO PT AND FAMILY, ALL
VERBALIZED UNDERSTANDING REGARDING TEACHING. WHEELED PT TO FRONT ENTRANCE, VIA
WHEELCHAIR, WITH ALL BELONGINGS, ACCOMPANIED BY FAMILY, NAD NOTED.

## 2019-11-09 NOTE — NUR
PT UP TO SIDE OF BED, A/O X4, RESP EVEN AND NONLABORED ON RA. RT HAND IV SL.
LT ARM FISTULA WITH BRUIE AND THRILL NOTED. PT REQUESTED A GOWN, WILL PROVIDE
PT WITH GOWN. PT DENIES ANY OTHER NEEDS AT THIS TIME. CALL LIGHT IN REACH, NAD
NOTED, WILL CONTINUE PLAN OF CARE.

## 2019-11-13 NOTE — EC
PATIENT:BROOK CABELLO                   DATE OF SERVICE: 11/04/19
SEX: M                                  MEDICAL RECORD: [id number]
DATE OF BIRTH: [birth year]                        LOCATION:D.M2      D.213
AGE OF PATIENT: 68                             ADMISSION DATE: 11/04/19
 
REFERRING PHYSICIAN:                               
 
INTERPRETING PHYSICIAN: MARIA ESTHER BHANDARI MD             
 
 
 
                             ECHOCARDIOGRAM REPORT
  ECHO CHARGES 4               ECHO COMPLETE                 Date: 11/07/19
 
 
 
CLINICAL DIAGNOSIS: SOB                           
 
                         ECHOCARDIOGRAPHIC MEASUREMENTS
      (adult normal given)
   AC root (d.<3.7cm) 3.6  cm   LV Septum d (<1.2 cm> 1.7  cm
      Valve Excursion 0.6  cm     LV Septum (systole) 2.1  cm
Left Atria (s.<4.0cm> 3.9  cm          LVPW d(<1.2cm) 1.5  cm
        RV (d.<2.3cm) 2.4  cm           LVPW (sytole) 2.3  cm
  LV diastole(<5.6CM) 5.6  cm       MV E-F(>70mm/sec)      cm
           LV systole 3.5  cm           LVOT Diameter 1.8  cm
       MV exc.(>10mm)      cm
Est.ejection fraction (50-75%)     %
 
   DOPPLER:
     LVIT      cm/sec A 127  cm/sec E 72.0  cm/sec
       LA      cm/sec      RVSP 35.0 mmHg
     LVOT 104  cm/sec   AOP1/2T      m/s
  Asc. Ao 362  cm/sec
     RVOT 74.0 cm/sec
       RA      cm/sec
         cm/sec
 AV Gradient Peak 53.0 mmHg  AV Mean 33.0 mmHg  AV Area 0.7  cm
 MV Gradient Peak 7.5  mmHg  MV Mean 1.8  mmHg  MV Area      cm
   COMMENTS:                                              
 
 
 Cardiac Sonographer: 1               TANA Denmark            
      Cardiologist: 1          Dr. Bhandari                
             TAPE# PACS           
                                       Pericardial Effusion N                        
 
 
DATE OF SERVICE:  
 
FINDINGS:
1.  Left ventricular chamber size is upper limits of normal.  Left ventricular
systolic function is mildly reduced at 40% to 45%.
2.  Left atrium, right atrium, and right ventricular chamber sizes are within
normal limits.
3.  Valvular structures:  Aortic valve demonstrates moderate calcific aortic
stenosis, valve area calculates to 0.7 and there is gradient of 53 mm across the
valve.  The remaining valvular structures have normal structure and motion.
 
 
 
ECHOCARDIOGRAM REPORT                          [id number]    BROOK CABELLO           
 
 
4.  Doppler interrogation elsewise reveals no significant valvular insufficiency
or stenosis.  Pulmonary systolic pressure is estimated at 35 mmHg.
5.  No evidence of pericardial effusion or left ventricular thrombus.
 
TRANSINT:[id number] Voice Confirmation ID: [id number] DOCUMENT ID: [id number]
                                           
                                           MARIA ESTHER BHANDARI MD             
 
 
 
Electronically Signed by MARIA ESTHER BHANDARI on 11/13/19 at 1041
 
 
 
 
 
 
 
 
 
 
 
 
 
 
 
 
 
 
 
 
 
 
 
 
 
 
 
 
 
 
 
 
 
 
 
CC:                                                             [id number]
DICTATION DATE: 11/08/19 0810     :     11/08/19 0820      DIS IN  
                                                                      11/09/19
[place] [id number] [place] [id number]

## 2019-11-17 ENCOUNTER — HOSPITAL ENCOUNTER (INPATIENT)
Dept: HOSPITAL 84 - D.ER | Age: 69
LOS: 5 days | DRG: 283 | End: 2019-11-22
Attending: INTERNAL MEDICINE | Admitting: INTERNAL MEDICINE
Payer: MEDICARE

## 2019-11-17 VITALS
HEIGHT: 70 IN | BODY MASS INDEX: 19.61 KG/M2 | BODY MASS INDEX: 19.61 KG/M2 | WEIGHT: 136.99 LBS | BODY MASS INDEX: 19.61 KG/M2 | WEIGHT: 136.99 LBS | HEIGHT: 70 IN

## 2019-11-17 DIAGNOSIS — I21.4: Primary | ICD-10-CM

## 2019-11-17 DIAGNOSIS — I35.0: ICD-10-CM

## 2019-11-17 DIAGNOSIS — R40.2214: ICD-10-CM

## 2019-11-17 DIAGNOSIS — R41.0: ICD-10-CM

## 2019-11-17 DIAGNOSIS — E78.5: ICD-10-CM

## 2019-11-17 DIAGNOSIS — I25.10: ICD-10-CM

## 2019-11-17 DIAGNOSIS — Z99.2: ICD-10-CM

## 2019-11-17 DIAGNOSIS — Z87.891: ICD-10-CM

## 2019-11-17 DIAGNOSIS — S72.002A: ICD-10-CM

## 2019-11-17 DIAGNOSIS — I12.0: ICD-10-CM

## 2019-11-17 DIAGNOSIS — E87.5: ICD-10-CM

## 2019-11-17 DIAGNOSIS — R40.2124: ICD-10-CM
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
## 2019-11-17 DIAGNOSIS — R40.2344: ICD-10-CM

## 2019-11-17 DIAGNOSIS — J96.01: ICD-10-CM

## 2019-11-17 DIAGNOSIS — F01.51: ICD-10-CM

## 2019-11-17 DIAGNOSIS — N18.6: ICD-10-CM

## 2019-11-17 DIAGNOSIS — Z91.83: ICD-10-CM

## 2019-11-17 DIAGNOSIS — D63.1: ICD-10-CM

## 2019-11-17 LAB
CK MB SERPL-MCNC: 41.8 U/L (ref 0–3.6)
CK SERPL-CCNC: 291 UL (ref 21–232)
TROPONIN I SERPL-MCNC: 7.43 NG/ML (ref 0–0.06)

## 2019-11-17 PROCEDURE — 0BH17EZ INSERTION OF ENDOTRACHEAL AIRWAY INTO TRACHEA, VIA NATURAL OR ARTIFICIAL OPENING: ICD-10-PCS | Performed by: FAMILY MEDICINE

## 2019-11-17 PROCEDURE — 5A1935Z RESPIRATORY VENTILATION, LESS THAN 24 CONSECUTIVE HOURS: ICD-10-PCS | Performed by: FAMILY MEDICINE

## 2019-11-17 NOTE — HEMODYNAMI
PATIENT:BROOK CABELLO                                    MEDICAL RECORD: [id number]
: 46                                            LOCATION:[facility]     [address]
[id number]                                       ADMISSION DATE: 19
 
 
 Generatedon:201911:35
Patient name: BROOK CABELLO Patient #: [id number] Visit #: [id number] SSN: [id number]
- :
1950 Date of study: 2019
Page: Of
Hemodynamic Procedure Report
****************************
Patient Data
Patient Demographics
Procedure consent was obtained
First Name: BROOK           Gender: Male
Last Name: DESTINEY          : 1950
Patient #: [id number]       Age: 68 year(s)
Visit #: [id number]       Race: Black
SSN: 
Accession #:
[id number]
Additional ID: [id number]
Contact details
Address: [address] [place] [address]    Phone: [phone]
[address]
State: [place]
City: [place]
Zip code: [address]
Past Medical History
Allergies: No known allergies
Admission
Admission Data
Admission Date: 2019  Admission Time: 23:20
Arrival Date: 2019    Arrival Time: 0:00
Admit Source: Other         Insurance Payor: Medicare,
Room #: D.2118              Medicaid
Height (in.): 69.69         BSA: 1.84 (m2)
Height (cm.): 177           BMI: 21.71 (kg/m2)
Weight (lbs.): 149.92
Weight (kg.): 68
Lab Results
Lab Result Date: 2019 Lab Result Time: 0:00
Biochemistry
Name         Units    Result                Min      Max
BUN          mg/dl    58       --(----)-*   7        18
Creatinine   mg/dl    10.9     --(----)-*   0.6      1.3
CBC
Name         Units    Result                Min      Max
Hemoglobin   g/dl     10.3     *-(----)--   13.5     17.5
Procedure
Procedure Types
Cath Procedure
Diagnostic Procedure
LHC
Coronaries w/Grafts
FFR/IVUS
FFR Initial
Sedation Charges
 
Moderate Sedation up to 15 minutes
Procedure Description
Procedure Date
Procedure Date: 2019
Procedure Start Time: 11:13
Procedure End Time: 11:33
Procedure Staff
Name                            Function
Trevor Bhandari MD                Performing Physician
Luisana Colon RT               Monitor
Mariela Lloyd RT                    Scrub
Jazzmine Coronel RT              Scrub
Heidi Stein RN                Nurse
Indication
Chest pain
Chest discomfort
Procedure Data
Cath Procedure
Fluoroscopy
Diagnostic fluoroscopy      Total fluoroscopy Time: 6.6
time: 6.6 min               min
Diagnostic fluoroscopy      Total fluoroscopy dose: 598
dose: 598 mGy               mGy
Contrast Material
Contrast Material Type                       Amount (ml)
Isovue 300                                   120
Entry Location
Entry     Primary  Successful  Side  Size  Upsize Upsize Entry    Closure Succes
sful  Closure
Location                             (Fr)  1 (Fr) 2 (Fr) Remarks  Device        
      Remarks
Femoral                        Right 5 Fr  6 Fr                   Exoseal
artery                                     Short
Estimated blood loss: 10 ml
Diagnostic catheters
Device Type               Used For           End Catheter
Placement
MULTIPACK Pigtail 5 Fr    Ventriculography
catheter
MULTIPACK JL 4.0 5Fr      Procedure
catheter
MULTIPACK 3DRC 5Fr        Procedure
catheter
DIAGNOSTIC AR MOD 5Fr     Procedure
Catheter ([id number])
Procedure Complications
No complications
Procedure Medications
Medication           Administration Route Dosage
0.9% NaCl            I.V.                 100 ml/hr
Oxygen               etCO2 Nasal cannula  2 l/min
Lidocaine 2%         added to field       20
Heparin Flush Bag    added to field       2 bags
(1000units/500ml NS)
Versed               I.V.                 2 mg
Fentanyl             I.V.                 50 mcg
Fentanyl             I.V.                 50 mcg
Hemodynamics
Rest
BSA: 1.84 (m2) HGB: 10.3 (g/dl) O2 Consumption: Estimated: 223.81 (ml/min) O2 Co
 
nsumption
indexed: Estimated:121.64 (ml/min/m) Heart Rate: 86 (bpm)
Pressure Samples
Time     Site     Value (mmHg) Purpose      Heart      Use
Rate(bpm)
11:16    LV       107/4,14     Snapshot     78
Snapshots
Pre Cath      Intra         NCS           Post Cath
Vital Signs
Time     Heart  Resp   SPO2 etCO2   NIBP (mmHg)  Rhythm  Pain    Sedation
Rate   (ipm)  (%)  (mmHg)                       Status  Level
(bpm)
10:58:06 85     25     96   26.1    145/94(115)  NSR     0 (11)  10(A)
, No
pain
11:02:20 83     18     95   19.4    150/136(139) NSR     0 (11)  10(A)
, No
pain
11:06:36 80     11     96   24.7    128/81(100)  NSR     0 (11)  10(A)
, No
pain
11:11:18 79     19     96   35.1    120/75(97)   NSR     0 (11)  10(A)
, No
pain
11:15:31 78     12     96   22.4    118/70(88)   NSR     0 (11)  9(A)
, No
pain
11:19:36 78     19     96   21.6    116/75(91)   NSR     0 (11)  9(A)
, No
pain
11:23:42 79     19     97   29.1    117/74(94)   NSR     0 (11)  9(A)
, No
pain
11:27:40 78     15     96   35.1    109/75(94)   NSR     0 (11)  9(A)
, No
pain
11:31:54 78     21     97   34.4    62/30(53)    NSR     0 (11)  10(A)
, No
pain
Medications
Time     Medication       Route   Dose  Verified Delivered Reason     Notes  Eff
ectiveness
by       by
10:57:09 0.9% NaCl        I.V.    100   Trevor  Heidi     used for
ml/hr Elisabet Stein   procedure
RN
10:57:15 Oxygen           etCO2   2     Trevor  Heidi     used for
Nasal   l/min Elisabet Stein   procedure
cannula                RN
10:57:20 Lidocaine 2%     added   20ml  Trevor  Trevor   for local
to      vial  Elisabet Bhandari MD  anesthetic
field
10:57:25 Heparin Flush    added   2     Trevor  Trevor   used for
Bag              to      bags  Elisabet Bhandari MD  procedure
(1000units/500ml field
NS)
11:13:17 Versed           I.V.    2 mg  Trevor  Hedii     for
Elisabet Stein   sedation
RN
11:13:22 Fentanyl         I.V.    50    Trevor  Heidi     for
 
mcg   Elisabet Stein   sedation
RN
11:18:58 Fentanyl         I.V.    50    Trevor  Heidi     for
mcg   Elisabet Stein   sedation
RN
Procedure Log
Time     Note
10:36:50 Diagnostic Cath Status : Urgent
10:37:30 Jazzmine Coronel RT(R) (CV) sent for patient. Start
room use.
10:37:31 Time tracking: Regular hours (M-F 7:00 - 5:00)
10:37:36 Plan of Care:Hemodynamics will remain stable., Cardiac
rhythm will remain stable., Comfort level will be
maintained., Respiratory function will remain
adequate., Patient/ family verbilizes understanding of
procedure., Procedure tolerated without complication.,
Recovers from procedure without complications..
10:45:03 Indication : Chest pain
10:45:08 Indication : Chest discomfort
10:50:30 Arrival Date: 2019 12:00:00 AM
10:50:32 Admit Source: Other
10:50:43 Insurance Payor : Medicare, Medicaid
10:51:12 Patient Height : 69.69 inches
10:51:16 Patient Weight : 149.92 lbs
10:51:55 Lab Result : Hemoglobin 10.3 g/dl
10:51:55 Lab Result : Creatinine 10.9 mg/dl
10:51:55 Lab Result : BUN 58 mg/dl
10:52:10 Patient received from Med II to CCL 2 Alert and
oriented. Tansferred to table in Supine position.
10:52:13 Signed procedure consent form obtained from patient.
10:52:14 Warm blankets applied, and monica hugger turned on for
patient comfort.
10:52:14 Correct patient and procedure confirmed by team.
10:52:15 ECG and BP/O2 sat monitors applied to patient.
10:52:31 H&P Date Dictated: 2019 Within 30 days and on
chart., H&P Addendum completed by physician on day of
procedure. (MUST COMPLETE FOR ALL OUTPATIENTS).
10:52:32 Pre-procedure instructions explained to patient.
10:52:39 Family unavailable.
10:52:57 Patient allergic to No known allergies
10:53:00 Is the patient allergic to Iodine/contrast media? No.
10:53:01 Was the patient premedicated? Yes
10:57:01 Vital chart was started
10:57:09 0.9% NaCl 100 ml/hr I.V. was administered by Heidi Stein RN; used for procedure; Verbal order read back
and verified.
10:57:15 Oxygen 2 l/min etCO2 Nasal cannula was administered by
Heidi Stein RN; used for procedure; Verbal order
read back and verified.
10:57:18 Is patient on blood thinner?No
10:57:20 Lidocaine 2% 20ml vial added to field was administered
by Trevor Bhandari MD; for local anesthetic; Verbal
order read back and verified.
10:57:21 Patient diabetic? No.
10:57:25 Heparin Flush Bag (1000units/500ml NS) 2 bags added to
field was administered by Trevor Bhandari MD; used for
procedure; Verbal order read back and verified.
10:57:25 Snore? Yes
10:57:27 Sleep apnea? No
10:57:29 Deviated septum? No
 
10:57:33 Dentures? No ?
10:57:42 Patient pain scale 0/10 ?.
10:57:54 IV patent on arrival in right forearm with 0.9% NaCl
at O.
10:58:01 Lab results completed and on chart.
10:58:11 Stress Test: no; N/A ?
10:58:14 Risk of Mortality: 8.6
10:58:18 Risk of blood transfusion: 13.9
10:58:21 Risk of JOSE M: 26.
10:58:26 Right groin area was prepped with betadine and draped
in sterile fashion
10:58:35 Baseline sample Acquired.
10:58:37 Full Disclosure recording started
10:58:50 Pre procedure: right dorsailis pedis pulse Doppler
10:59:01 Alarms reviewed by R. N.
10:59:02 Sharps counted by scrub and verified by R.N.
11:05:10 5) <15 or on dialysis Very severe, or end stage kidney
failure.
11:05:32 Maximum allowable contrast dose (3.7 X eGFR X 0.75)16
ml.
11:11:53 Physician arrived
11:11:53 --------ALL STOP TIME OUT------
11:11:55 Final Timeout: patient, procedure, and site verified
with staff and physician. All members of the team are
in agreement.
11:12:05 Right groin site verified by team.
11:12:09 Fire Safety Assessment: A--An alcohol-based skin
anteseptic being used preoperatively., C--Open oxygen
or nitrous oxide is being used., D--An ESU, laser, or
fiber-optic light is being used.
11:12:25 Physical assessment completed. ASA score P 3 - A
patient with severe systemic disease as per Trevor Bhandari MD.
11:12:32 Sedation plan: IV Moderate Sedation Medication:Versed,
Fentanyl
11:12:36 Use device set Femoral Dx
11:12:44 Procedure started.
11:12:51 ACIST Syringe (02506) opened to sterile field.
11:12:51 Bag Decanter () opened to sterile field.
11:12:56 Zero performed for pressure channel P1
11:13:06 Zero performed for pressure channel P1
11:13:15 Medline Cath Pack ([id number]) opened to sterile field.
11:13:17 Versed 2 mg I.V. was administered by Heidi Stein RN;
for sedation; Verbal order read back and verified.
11:13:17 ACIST Hand Control (55100) opened to sterile field.
11:13:17 ACIST Manifold (16131) opened to sterile field.
11:13:18 DIAGNOSTIC Multipack 5Fr catheter set (NQ3148) opened
to sterile field.
11:13:21 SHEATH 5FR [place] (MLK327) opened to sterile field.
11:13:22 Fentanyl 50 mcg I.V. was administered by Heidi Stein
RN; for sedation; Verbal order read back and verified.
11:13:22 EMERALD Guide Wire (277-254) opened to sterile field.
11:13:29 Local anesthetic to right femoral artery with
Lidocaine 2% by Trevor Bhandari MD.**INITIAL ACCESS
ONLY**
11:13:41 A 5 Fr sheath was inserted into the Right Femoral
artery
11:14:11 J wire advanced.
11:14:47 A MULTIPACK Pigtail 5 Fr catheter was advanced over
the wire and used for Ventriculography.
 
11:15:00 LV angiography performed.
11:16:22 EF : 30 %
11:16:30 Catheter removed.
11:16:36 A MULTIPACK JL 4.0 5Fr catheter was advanced over the
wire and used for Procedure.
11:16:44 LCA angiography performed.
11:18:29 Catheter removed.
11:18:35 A MULTIPACK 3DRC 5Fr catheter was advanced over the
wire and used for Procedure.
11:18:44 SVG to LAD angiography performed.
11:18:57 RCA angiography performed.
11:18:58 Fentanyl 50 mcg I.V. was administered by Heidi Stein
RN; for sedation; Verbal order read back and verified.
11:19:21 Catheter removed.
11:19:53 A DIAGNOSTIC AR MOD 5Fr Catheter ([id number]) was
advanced over the wire and used for Procedure.
11:19:58 SVG to Circ angiography performed.
11:20:57 Catheter removed.
11:21:12 SHEATH 6FR [place] (BTB798) opened to sterile field.
11:21:23 Sheath upsized to a 6 Fr Short.
11:23:39 GUIDE 6FR ART 4.0 catheter ([id number]) opened to
sterile field.
11:23:55 6 Fr ART4 guide catheter was inserted over the wire
11:24:17 INFLATOR Merit BasixCompak (FH0801) opened to sterile
field.
11:24:18 Volcano Verrata Plus pressure wire (03296I) opened to
sterile field.
11:24:19 MIRACLEBROS guidewire (NO COST) opened to sterile
field.
11:24:47 GUIDE 6FR AL 2.0 catheter (XZ2QU98) opened to sterile
field.
11:24:56 6 Fr AL6 guide catheter was inserted over the wire
11:26:31 Guide catheter removed.
11:27:35 GUIDE 6FR ART 3.5 catheter ([id number]) opened to
sterile field.
11:27:56 FFR/IFR wire advanced.
11:29:21 Wire advanced across lesion.
11:29:25 Baseline FFR .91.
11:30:41 Wire removed.
11:30:42 Catheter removed.
11:30:54 Sheath removed intact; hemostasis achieved with
Exoseal to the Right Femoral artery.
11:31:03 EXOSEAL 6Fr () opened to sterile field.
11:31:06 Procedure ended.(Physican Out)
11:31:16 Fluoroscopy time 06.60 minutes.
11:31:22 Fluoroscopy dose: 598 mGy
11:31:22 Flurop Dose total: 598
11:31:26 Dose Area Product 48347 mGy/cm.
11:31:48 Contrast amount:Isovue 300 120ml.
11:31:50 Maximum allowable dose exceeded? No.
11:31:51 Sharps counted by scrub and verified by R.N.
11:31:54 Insertion/operative site no bleeding no hematoma.
11:31:57 Post-op/insertion site Right Femoral artery dressed
using a 4 x 4 and Tegaderm.
11:32:01 Post Procedure Pulses reassessed and unchanged
11:32:10 Post-procedure physical assessment completed. ASA
score P 2 - A patient with mild systemic disease as
per Trevor Bhandari MD.
11:32:13 Post procedure rhythm: unchanged.
11:32:16 Estimated blood loss: 10 ml
 
11:32:17 Post procedure instruction explained to
patient.Patient verbalizes understanding.
11:32:38 Procedure type changed to Cath procedure, Diagnostic
procedure, LHC, Coronaries w/Grafts, FFR/IVUS, FFR
Initial, Sedation Charges, Moderate Sedation up to 15
minutes
11:32:44 Procedure and supply charges have been captured,
reviewed, submitted and are correct.
11:33:09 Procedure Complication : No complications
11:33:11 Vital chart was stopped
11:33:13 [facility] Findings: mild to moderate CAD (<70%)
11:33:20 Operative report dictated upon procedure completion.
11:33:21 See physician's report for complete and final results.
11:33:25 Report given to [facility].
11:33:28 Patient transfered to [facility] with Bed.
11:33:31 Procedure ended.
11:33:31 Full Disclosure recording stopped
11:33:36 End room use (Document Last)
11:33:52 **ACC-PCI Only** Patient was given prescriptions, or
instructed by Trevor Bhandari MD to start/continue the
following medications upon discharge: Plavix
Device Usage
Item Name   Manufacture  Quantity  Catalog Number Hospital Part       Current [facility] Lot# /
Charge   Number     Stock   Stock   Serial#
Code
ACIST       Acist        1         45914          195389   292485     127389  20
Syringe     Medical
(55419)     Systems Inc
Bag         Microtek     1                   328229   66996      376425  5
Decanter    Medical Inc.
()
Medline     Medline      1         [id number]      160005   64047      742767  5
Cath Pack
([id number])
ACIST Hand  Acist        1         28325          036498   955859     330195  5
Control     Medical
(82738)     Systems Inc
ACIST       Acist        1         74344          983982   958169     947493  5
Manifold    Medical
(34105)     Systems Inc
DIAGNOSTIC  Cardinal     1         IT1884         843926   20190      358592  30
Multipack   Health
5Fr
catheter
set
(TG2307)
SHEATH 5FR  Terumo       1         MYY905         567481   922500     139569  5
Flat Rock
(ILY287)
EMERALD     Cardinal     1         502-919        743481   410424     638891  5
Guide Wire  Health
(502-485)
MULTIPACK   Cardinal     1                                            743283  5
Pigtail 5   Health
Fr catheter
MULTIPACK   Cardinal     1                                            270002  5
JL 4.0 5Fr  Health
catheter
MULTIPACK   Cardinal     1                                            501032  5
 
3DRC 5Fr    Health
catheter
DIAGNOSTIC  Cardinal     1         280164E        809879   578695     199094  15
AR MOD 5Fr  Health
Catheter
(637250M)
SHEATH 6FR  Terumo       1         HMJ814         521682   632658     207455  40
Flat Rock
(UAR723)
GUIDE 6FR   Lonsdale       1         [id number]  448407   274348     889504  0
ART 4.0     Scientific
catheter
(287446766)
INFLATOR    Merit        1         BH1978         935959   388100     086259  15
Merit       Medical
BasixCompak
(KR2967)
Volcano     Davin      1         37098T         446300   565057037  436307  5
Verrata
Plus
pressure
wire
(45338J)
MIRACLEBROS Abbott       1         [id number]       282186              186389  5
guidewire   Vascular
(NO COST)
GUIDE 6FR   Medtronic    1         LO2GW86        798473   27737      929301  1
AL 2.0
catheter
(VO3DF19)
GUIDE 6FR   L[place]dale       1         [id number]  738472   029872     606037  0
ART 3.5     Scientific
catheter
(997309147)
EXOSEAL 6Fr Cardinal     1                   393479   366571     721952  10
()     Health
Signature Audit Medina
Stage           Time        Signature      Unsigned
Intra-Procedure 2019  Luisana Colon
11:34:10 AM RT(R)
Intra-Procedure 2019  Heidi Stein
11:34:46 AM RN
Intra-Procedure 2019  Trevor Bhandari
11:35:09 AM MD
Signatures
Performing Physician :  Signature :
Trevor Bhandari MD        ______________________________
Date : ______________ Time :
______________
Monitor : Luisana Colon Signature :
RT                       ______________________________
Date : ______________ Time :
______________
Nurse : Heidi Stein RN Signature :
______________________________
 
Date : ______________ Time :
______________
 
 
 
 
 
 
 
 
 
 
 
 
 
 
 
 
 
 
 
 
 
 
 
 
 
 
 
 
 
 
 
 
 
 
 
 
 
 
 
 
 
 
 
 
 
 
 
 
 
 
 
 
 
[address] [place] [address]

## 2019-11-18 VITALS — SYSTOLIC BLOOD PRESSURE: 143 MMHG | DIASTOLIC BLOOD PRESSURE: 80 MMHG

## 2019-11-18 VITALS — DIASTOLIC BLOOD PRESSURE: 80 MMHG | SYSTOLIC BLOOD PRESSURE: 127 MMHG

## 2019-11-18 VITALS — SYSTOLIC BLOOD PRESSURE: 126 MMHG | DIASTOLIC BLOOD PRESSURE: 73 MMHG

## 2019-11-18 VITALS — DIASTOLIC BLOOD PRESSURE: 65 MMHG | SYSTOLIC BLOOD PRESSURE: 129 MMHG

## 2019-11-18 LAB
ALT SERPL-CCNC: 23 U/L (ref 10–68)
ANION GAP SERPL CALC-SCNC: 17.8 MMOL/L (ref 8–16)
BASOPHILS NFR BLD AUTO: 0 % (ref 0–2)
BUN SERPL-MCNC: 58 MG/DL (ref 7–18)
CALCIUM SERPL-MCNC: 8.4 MG/DL (ref 8.5–10.1)
CHLORIDE SERPL-SCNC: 102 MMOL/L (ref 98–107)
CHOLEST/HDLC SERPL: 2.9 RATIO (ref 2.3–4.9)
CO2 SERPL-SCNC: 30 MMOL/L (ref 21–32)
CREAT SERPL-MCNC: 10.9 MG/DL (ref 0.6–1.3)
EOSINOPHIL NFR BLD: 0.2 % (ref 0–7)
ERYTHROCYTE [DISTWIDTH] IN BLOOD BY AUTOMATED COUNT: 16.3 % (ref 11.5–14.5)
GLUCOSE SERPL-MCNC: 89 MG/DL (ref 74–106)
HCT VFR BLD CALC: 32.8 % (ref 42–54)
HDLC SERPL-MCNC: 50 MG/DL (ref 32–96)
HGB BLD-MCNC: 10.3 G/DL (ref 13.5–17.5)
IMM GRANULOCYTES NFR BLD: 0.3 % (ref 0–5)
LDL-HDL RATIO: 1.6 RATIO (ref 1.5–3.5)
LDLC SERPL-MCNC: 80 MG/DL (ref 0–100)
LYMPHOCYTES NFR BLD AUTO: 16.4 % (ref 15–50)
MCH RBC QN AUTO: 30.1 PG (ref 26–34)
MCHC RBC AUTO-ENTMCNC: 31.4 G/DL (ref 31–37)
MCV RBC: 95.9 FL (ref 80–100)
MONOCYTES NFR BLD: 8.9 % (ref 2–11)
NEUTROPHILS NFR BLD AUTO: 74.2 % (ref 40–80)
OSMOLALITY SERPL CALC.SUM OF ELEC: 301 MOSM/KG (ref 275–300)
PLATELET # BLD: 193 10X3/UL (ref 130–400)
PMV BLD AUTO: 11 FL (ref 7.4–10.4)
POTASSIUM SERPL-SCNC: 5.8 MMOL/L (ref 3.5–5.1)
RBC # BLD AUTO: 3.42 10X6/UL (ref 4.2–6.1)
SODIUM SERPL-SCNC: 144 MMOL/L (ref 136–145)
TRIGL SERPL-MCNC: 72 MG/DL (ref 30–200)
WBC # BLD AUTO: 6.6 10X3/UL (ref 4.8–10.8)

## 2019-11-18 PROCEDURE — 5A1D70Z PERFORMANCE OF URINARY FILTRATION, INTERMITTENT, LESS THAN 6 HOURS PER DAY: ICD-10-PCS | Performed by: INTERNAL MEDICINE

## 2019-11-18 PROCEDURE — B2111ZZ FLUOROSCOPY OF MULTIPLE CORONARY ARTERIES USING LOW OSMOLAR CONTRAST: ICD-10-PCS | Performed by: INTERNAL MEDICINE

## 2019-11-18 PROCEDURE — B2151ZZ FLUOROSCOPY OF LEFT HEART USING LOW OSMOLAR CONTRAST: ICD-10-PCS | Performed by: INTERNAL MEDICINE

## 2019-11-18 PROCEDURE — 4A033BC MEASUREMENT OF ARTERIAL PRESSURE, CORONARY, PERCUTANEOUS APPROACH: ICD-10-PCS | Performed by: INTERNAL MEDICINE

## 2019-11-18 PROCEDURE — B2121ZZ FLUOROSCOPY OF SINGLE CORONARY ARTERY BYPASS GRAFT USING LOW OSMOLAR CONTRAST: ICD-10-PCS | Performed by: INTERNAL MEDICINE

## 2019-11-18 PROCEDURE — B2181ZZ FLUOROSCOPY OF LEFT INTERNAL MAMMARY BYPASS GRAFT USING LOW OSMOLAR CONTRAST: ICD-10-PCS | Performed by: INTERNAL MEDICINE

## 2019-11-18 PROCEDURE — 4A023N7 MEASUREMENT OF CARDIAC SAMPLING AND PRESSURE, LEFT HEART, PERCUTANEOUS APPROACH: ICD-10-PCS | Performed by: INTERNAL MEDICINE

## 2019-11-18 NOTE — NUR
RECIEVED BEDSIDE SHIFT REPORT. ALERT AND ORIENTED X4. UP AD VIVEK. DSG TO RIGHT
GROIN CDI. IV TO RIGHT WRIST SL.. TELEMETRY IN PLACE. DENIES ANY NEEDS.

## 2019-11-18 NOTE — NUR
BACK FROM CATH LAB.  RIGHT GROIN STABLE WITHOUT BLEEDING OR HEMATOMA NOTED.
LEAVING FOR DIALYSIS BY BED.

## 2019-11-19 VITALS — SYSTOLIC BLOOD PRESSURE: 130 MMHG | DIASTOLIC BLOOD PRESSURE: 76 MMHG

## 2019-11-19 VITALS — DIASTOLIC BLOOD PRESSURE: 72 MMHG | SYSTOLIC BLOOD PRESSURE: 100 MMHG

## 2019-11-19 VITALS — SYSTOLIC BLOOD PRESSURE: 101 MMHG | DIASTOLIC BLOOD PRESSURE: 70 MMHG

## 2019-11-19 VITALS — SYSTOLIC BLOOD PRESSURE: 84 MMHG | DIASTOLIC BLOOD PRESSURE: 67 MMHG

## 2019-11-19 VITALS — DIASTOLIC BLOOD PRESSURE: 86 MMHG | SYSTOLIC BLOOD PRESSURE: 135 MMHG

## 2019-11-19 VITALS — DIASTOLIC BLOOD PRESSURE: 78 MMHG | SYSTOLIC BLOOD PRESSURE: 144 MMHG

## 2019-11-19 VITALS — DIASTOLIC BLOOD PRESSURE: 86 MMHG | SYSTOLIC BLOOD PRESSURE: 163 MMHG

## 2019-11-19 VITALS — DIASTOLIC BLOOD PRESSURE: 84 MMHG | SYSTOLIC BLOOD PRESSURE: 119 MMHG

## 2019-11-19 VITALS — DIASTOLIC BLOOD PRESSURE: 79 MMHG | SYSTOLIC BLOOD PRESSURE: 126 MMHG

## 2019-11-19 VITALS — DIASTOLIC BLOOD PRESSURE: 75 MMHG | SYSTOLIC BLOOD PRESSURE: 132 MMHG

## 2019-11-19 NOTE — NUR
Reassessment completed per flowsheet, no changes noted from previous
assessment. Patient disoriented to time/place/situation, follows instructions
with reorientation unsuccessful. S1/S2 noted Sinus Tach on telemetry, rythmic
and regular. Breathing is shallow on room air with O2 sat 93%, lung sounds
clear bilateral upper and mid with diminished lower. L arm fistula WNL,
thrill/bruit noted. All pulses palpable with cap refill < 3 sec, skin
warm/dry. Patient denies pain, winces with movement. Repositioned for comfort,
no further needs at this time. See flowhseet for details, all VSS and will
continue to monitor.

## 2019-11-19 NOTE — NUR
FOUND UP WANDERING OUT OF ROOM, CONFUSED WITH UNSTEADY GAIT.  ASSISTED BACK TO
BED WITH BED ALARM ON.  WILL MONITOR.

## 2019-11-19 NOTE — NUR
PT ARRIVED TO ROOM 2301 VIA OCTAVIO CHAIR ESCORTED BY AUDREY, STAFF MEMBER. AUDREY
STATED "IF HE'S IN THE OCTAVIO CHAIR HE'LL STAY CALM AND IN THE CHAIR. IF YOU PUT
HIM IN THE BED HE'LL GET UP. HE STAYS CALM IN THE OCTAVIO CHAIR." KEPT PT IN OCTAVIO
CHAIR WITH DOOR OPEN AND IN SIGHT OF PT. PT CALM AND COOPERATIVE ON TRANSFER.
WILL CONT TO MONITOR.

## 2019-11-19 NOTE — NUR
Patient resting in bed with eyes open, disoriented to time/place/situation. HS
meds given with applesauce, no difficulty swallowing noted. Patient winces
with movement but denies pain when questioned, repositioned for comfort. No
further needs and will continue to monitor.

## 2019-11-19 NOTE — NUR
PT IN BED, BILATERAL SOFT WRIST RESTRAINTS APPLIED. APN MONISHA AT BEDSIDE,
NOTIFIED OF RESTRAINTS. WILL CONT TO MONITOR.

## 2019-11-19 NOTE — NUR
PT GOT OUT OF OCTAVIO CHAIR AND FELL ON RIGHT SIDE ON FLOOR. PT DENIES ANY PAIN.
COLE RN, YARIEL RN, SAUNDRA RN ASKED PT IF HE WAS IN ANY PAIN BEFORE MOVING
PT, PT DENIED. PT DENIED HITTING HIS HEAD ON THE FLOOR. BED ALARM IN PLACE.
WILL CONT TO MONITOR.

## 2019-11-19 NOTE — NUR
Received patient laying in bed with eyes open, assessment completed per
flowsheet. Patient disoriented to time/place/situation, reorientation
unsuccessful. S1/S2 noted NSR on telemetry, rythmic and regular. Breathing is
shallow on room air with O2 sat 94%, lung sounds clear bilateral upper and mid
with diminished lower. Abdomen is round/soft with bowel sounds active x4,
non-tender. Patient anuric at this time. All pulses palpable with cap refill
< 3 sec, skin warm/dry. Unable to assess pain at this time, patient does not
answer questions. Repositioned for comfort, see flowsheet for details. All VSS
and will continue to monitor.

## 2019-11-19 NOTE — NUR
CONTUES TO GET MORE CONFUSED AN UNCOOPERATIVE.  YAEL NIXON CALLED FOR A SECOND
TIME.  DC CANCELLED.  HALDOL GIVEN.  TRANSFERED TO ICU BY OCTAVIO CHAIR FOR OCTAVIO
PSYCH EVAL.

## 2019-11-20 VITALS — SYSTOLIC BLOOD PRESSURE: 105 MMHG | DIASTOLIC BLOOD PRESSURE: 68 MMHG

## 2019-11-20 VITALS — DIASTOLIC BLOOD PRESSURE: 82 MMHG | SYSTOLIC BLOOD PRESSURE: 135 MMHG

## 2019-11-20 VITALS — SYSTOLIC BLOOD PRESSURE: 94 MMHG | DIASTOLIC BLOOD PRESSURE: 30 MMHG

## 2019-11-20 VITALS — SYSTOLIC BLOOD PRESSURE: 127 MMHG | DIASTOLIC BLOOD PRESSURE: 105 MMHG

## 2019-11-20 VITALS — DIASTOLIC BLOOD PRESSURE: 84 MMHG | SYSTOLIC BLOOD PRESSURE: 93 MMHG

## 2019-11-20 VITALS — SYSTOLIC BLOOD PRESSURE: 94 MMHG | DIASTOLIC BLOOD PRESSURE: 64 MMHG

## 2019-11-20 VITALS — DIASTOLIC BLOOD PRESSURE: 74 MMHG | SYSTOLIC BLOOD PRESSURE: 131 MMHG

## 2019-11-20 VITALS — DIASTOLIC BLOOD PRESSURE: 100 MMHG | SYSTOLIC BLOOD PRESSURE: 110 MMHG

## 2019-11-20 VITALS — DIASTOLIC BLOOD PRESSURE: 62 MMHG | SYSTOLIC BLOOD PRESSURE: 92 MMHG

## 2019-11-20 VITALS — SYSTOLIC BLOOD PRESSURE: 104 MMHG | DIASTOLIC BLOOD PRESSURE: 77 MMHG

## 2019-11-20 VITALS — SYSTOLIC BLOOD PRESSURE: 108 MMHG | DIASTOLIC BLOOD PRESSURE: 79 MMHG

## 2019-11-20 VITALS — SYSTOLIC BLOOD PRESSURE: 106 MMHG | DIASTOLIC BLOOD PRESSURE: 69 MMHG

## 2019-11-20 VITALS — DIASTOLIC BLOOD PRESSURE: 96 MMHG | SYSTOLIC BLOOD PRESSURE: 129 MMHG

## 2019-11-20 VITALS — SYSTOLIC BLOOD PRESSURE: 99 MMHG | DIASTOLIC BLOOD PRESSURE: 65 MMHG

## 2019-11-20 VITALS — DIASTOLIC BLOOD PRESSURE: 44 MMHG | SYSTOLIC BLOOD PRESSURE: 92 MMHG

## 2019-11-20 VITALS — DIASTOLIC BLOOD PRESSURE: 74 MMHG | SYSTOLIC BLOOD PRESSURE: 113 MMHG

## 2019-11-20 VITALS — DIASTOLIC BLOOD PRESSURE: 35 MMHG | SYSTOLIC BLOOD PRESSURE: 98 MMHG

## 2019-11-20 VITALS — SYSTOLIC BLOOD PRESSURE: 106 MMHG | DIASTOLIC BLOOD PRESSURE: 65 MMHG

## 2019-11-20 VITALS — DIASTOLIC BLOOD PRESSURE: 46 MMHG | SYSTOLIC BLOOD PRESSURE: 122 MMHG

## 2019-11-20 VITALS — DIASTOLIC BLOOD PRESSURE: 86 MMHG | SYSTOLIC BLOOD PRESSURE: 105 MMHG

## 2019-11-20 VITALS — DIASTOLIC BLOOD PRESSURE: 37 MMHG | SYSTOLIC BLOOD PRESSURE: 109 MMHG

## 2019-11-20 VITALS — DIASTOLIC BLOOD PRESSURE: 74 MMHG | SYSTOLIC BLOOD PRESSURE: 120 MMHG

## 2019-11-20 VITALS — SYSTOLIC BLOOD PRESSURE: 109 MMHG | DIASTOLIC BLOOD PRESSURE: 83 MMHG

## 2019-11-20 VITALS — DIASTOLIC BLOOD PRESSURE: 50 MMHG | SYSTOLIC BLOOD PRESSURE: 91 MMHG

## 2019-11-20 LAB
ALBUMIN SERPL-MCNC: 3.6 G/DL (ref 3.4–5)
ALP SERPL-CCNC: 88 U/L (ref 46–116)
ALT SERPL-CCNC: 29 U/L (ref 10–68)
ANION GAP SERPL CALC-SCNC: 26.3 MMOL/L (ref 8–16)
BASOPHILS NFR BLD AUTO: 0 % (ref 0–2)
BILIRUB SERPL-MCNC: 0.83 MG/DL (ref 0.2–1.3)
BUN SERPL-MCNC: 58 MG/DL (ref 7–18)
CALCIUM SERPL-MCNC: 8.8 MG/DL (ref 8.5–10.1)
CHLORIDE SERPL-SCNC: 97 MMOL/L (ref 98–107)
CO2 SERPL-SCNC: 20.9 MMOL/L (ref 21–32)
CREAT SERPL-MCNC: 9.3 MG/DL (ref 0.6–1.3)
EOSINOPHIL NFR BLD: 0 % (ref 0–7)
ERYTHROCYTE [DISTWIDTH] IN BLOOD BY AUTOMATED COUNT: 15.9 % (ref 11.5–14.5)
GLOBULIN SER-MCNC: 3.8 G/L
GLUCOSE SERPL-MCNC: 112 MG/DL (ref 74–106)
HCT VFR BLD CALC: 38.6 % (ref 42–54)
HGB BLD-MCNC: 12 G/DL (ref 13.5–17.5)
IMM GRANULOCYTES NFR BLD: 0.3 % (ref 0–5)
INR PPP: 1.37 (ref 0.85–1.17)
LYMPHOCYTES NFR BLD AUTO: 7.8 % (ref 15–50)
MCH RBC QN AUTO: 29.6 PG (ref 26–34)
MCHC RBC AUTO-ENTMCNC: 31.1 G/DL (ref 31–37)
MCV RBC: 95.1 FL (ref 80–100)
MONOCYTES NFR BLD: 10.8 % (ref 2–11)
NEUTROPHILS NFR BLD AUTO: 81.1 % (ref 40–80)
OSMOLALITY SERPL CALC.SUM OF ELEC: 294 MOSM/KG (ref 275–300)
PLATELET # BLD: 196 10X3/UL (ref 130–400)
PMV BLD AUTO: 10.9 FL (ref 7.4–10.4)
POTASSIUM SERPL-SCNC: 5.2 MMOL/L (ref 3.5–5.1)
PROT SERPL-MCNC: 7.4 G/DL (ref 6.4–8.2)
PROTHROMBIN TIME: 16.3 SECONDS (ref 11.6–15)
RBC # BLD AUTO: 4.06 10X6/UL (ref 4.2–6.1)
SODIUM SERPL-SCNC: 139 MMOL/L (ref 136–145)
WBC # BLD AUTO: 10.4 10X3/UL (ref 4.8–10.8)

## 2019-11-20 NOTE — NUR
Reassessment completed per flowsheet, no changes noted from previous
assessment. Patient disoriented to time/place/situation, follows instructions.
S1/S2 noted NSR on telemetry, rythmic and regular. Breathing is shallow on
room air with O2 sat 95%, lung sounds clear bilateral upper and mid with
diminished lower. R groin cath site soft to palpation, dressing CDI. All
pulses palpable with cap refill < 3 sec, skin warm/dry. Repositioned for
comfort, see flowsheet for details. All VSS and will continue to monitor.

## 2019-11-20 NOTE — NUR
SHIFT ASSESSMENT COMPLETE. VS STABLE. RESPIRATIONS EVEN AND UNLABORED. NO
VISUAL CUES OF DISTRESS NOTED. WILL CONTINUE TO MONITOR.

## 2019-11-20 NOTE — CN
PATIENT NAME:BROOK CABELLO                                 MEDICAL RECORD: [id number]
: 46                                              LOCATION:[facility]
ADMIT DATE: 19                                       ACCOUNT: [id number]
CONSULTING PHYSICIAN:    OLVIN ROMO MD             
                                               
REFERRING PHYSICIAN:     MORIAH FERMIN MD            
 
 
DATE OF CONSULTATION:  2019
 
PSYCHIATRIC CONSULTATION
 
IDENTIFYING DATA:  The patient is 68 years old and he is admitted to the
hospital on a voluntary basis.
 
CHIEF COMPLAINT:  Chest pain.
 
HISTORY OF PRESENT ILLNESS:  The patient presented to the hospital with a
history of coronary artery disease and chest pain.  He has been evaluated and
treated and was scheduled to be discharged, but became quite confused and
agitated.  He has almost no recollection of current events.  He is oriented to
person and place, but not at all to time and situation.  Telling me that he is
here for some reason related to his laundry and that the year is definitely
1965.  He denies that he would seek to harm himself or others.
 
MENTAL STATUS EXAMINATION:  The patient is awake, alert and partially oriented
as described above.  His mood is flat.  His affect is constricted.  Thought
processes are circumstantial with significant impairment of his memory,
concentration, and abstraction abilities.  He denies that he would seek to harm
himself or others.
 
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
 
PLAN:  The patient had agitation and aggression associated with this
hospitalization.  It is inappropriate for him to live alone.  He needs to be
transferred to the behavioral unit for ongoing treatment.  His long-term
prognosis is guarded.  Every effort will be made to place him in the least
restrictive environment possible.
 
TRANSINT:[id number] Voice Confirmation ID: [id number] DOCUMENT ID: [id number]
                                           
                                           OLVIN ROMO MD             
 
 
 
Electronically Signed by OLVIN ROMO on 19 at 1243
 
 
 
 
CC:                                                             [id number]
DICTATION DATE: 19 1526     :     19 1656      ADM IN  
                                                                              
[id number] [place] [address]

## 2019-11-20 NOTE — NUR
Attempted to contact Rasheed Winter (Sister) to obtain consent for procedure,
unable to reach with VM left requesting return call.

## 2019-11-20 NOTE — NUR
BEDSIDE SHIFT REPORT COMPLETE. ASSESSMENT DONE SEE FLOW SHEET. VSS. ROOM FREED
OF CLUTTER. DR TEJEDA GIVEN PT UPDATE. WILL CONTINUE TO MONITOR.

## 2019-11-20 NOTE — NUR
Patient laying in bed with eyes open, states he needs "to get to the doctor
for Dialysis". Explained current situation/pre-op plans with stated
understanding, disorientation noted. All VSS and will continue to monitor.

## 2019-11-20 NOTE — NUR
Patient awakens easily, remains disoriented to time/place/situation. Patient
attempting to get OOB, assisted back into bed with bed alarm on/functioning.
Repositioned for comfort, no further needs and will continue to monitor.

## 2019-11-20 NOTE — NUR
0830 DR LANGFORD NOTIFIED OF PT STATUS. ORDER FOR DIALYSIS TODAY. DIALYSIS
NOTIFIED. ANESTHESIA NOTIFIED OF PT STATUS ORDER TO HOLD INDUR GIVEN. DIALYSIS
TO BE GIVEN BEFORE PROCEDURE.
 
0900 PT OFF FLOOR FOR CT OF HEAD. RN AT BEDSIDE.

## 2019-11-20 NOTE — NUR
REPORT RECIEVED CARE ASSUMED ASSESSMENT DONE SEE FLOW SHEET. VSS. FAMILY
NOTIFIED OF SURGERY. NO CONSENT OBTAINED. PT SISTER INFORMED OF PROCEDURE.
CANNOT SIGN DUE TO DAUGHTERS. WILL CONTINUE TO MONITOR.

## 2019-11-21 VITALS — DIASTOLIC BLOOD PRESSURE: 50 MMHG | SYSTOLIC BLOOD PRESSURE: 112 MMHG

## 2019-11-21 VITALS — SYSTOLIC BLOOD PRESSURE: 124 MMHG | DIASTOLIC BLOOD PRESSURE: 39 MMHG

## 2019-11-21 VITALS — SYSTOLIC BLOOD PRESSURE: 118 MMHG | DIASTOLIC BLOOD PRESSURE: 68 MMHG

## 2019-11-21 VITALS — SYSTOLIC BLOOD PRESSURE: 110 MMHG | DIASTOLIC BLOOD PRESSURE: 68 MMHG

## 2019-11-21 VITALS — SYSTOLIC BLOOD PRESSURE: 129 MMHG | DIASTOLIC BLOOD PRESSURE: 69 MMHG

## 2019-11-21 VITALS — SYSTOLIC BLOOD PRESSURE: 130 MMHG | DIASTOLIC BLOOD PRESSURE: 65 MMHG

## 2019-11-21 VITALS — DIASTOLIC BLOOD PRESSURE: 57 MMHG | SYSTOLIC BLOOD PRESSURE: 126 MMHG

## 2019-11-21 VITALS — DIASTOLIC BLOOD PRESSURE: 110 MMHG | SYSTOLIC BLOOD PRESSURE: 133 MMHG

## 2019-11-21 VITALS — SYSTOLIC BLOOD PRESSURE: 146 MMHG | DIASTOLIC BLOOD PRESSURE: 96 MMHG

## 2019-11-21 VITALS — SYSTOLIC BLOOD PRESSURE: 113 MMHG | DIASTOLIC BLOOD PRESSURE: 83 MMHG

## 2019-11-21 VITALS — DIASTOLIC BLOOD PRESSURE: 73 MMHG | SYSTOLIC BLOOD PRESSURE: 127 MMHG

## 2019-11-21 VITALS — SYSTOLIC BLOOD PRESSURE: 115 MMHG | DIASTOLIC BLOOD PRESSURE: 65 MMHG

## 2019-11-21 VITALS — DIASTOLIC BLOOD PRESSURE: 54 MMHG | SYSTOLIC BLOOD PRESSURE: 102 MMHG

## 2019-11-21 VITALS — DIASTOLIC BLOOD PRESSURE: 61 MMHG | SYSTOLIC BLOOD PRESSURE: 99 MMHG

## 2019-11-21 VITALS — DIASTOLIC BLOOD PRESSURE: 65 MMHG | SYSTOLIC BLOOD PRESSURE: 114 MMHG

## 2019-11-21 VITALS — DIASTOLIC BLOOD PRESSURE: 47 MMHG | SYSTOLIC BLOOD PRESSURE: 104 MMHG

## 2019-11-21 VITALS — SYSTOLIC BLOOD PRESSURE: 138 MMHG | DIASTOLIC BLOOD PRESSURE: 73 MMHG

## 2019-11-21 VITALS — DIASTOLIC BLOOD PRESSURE: 68 MMHG | SYSTOLIC BLOOD PRESSURE: 107 MMHG

## 2019-11-21 VITALS — SYSTOLIC BLOOD PRESSURE: 100 MMHG | DIASTOLIC BLOOD PRESSURE: 59 MMHG

## 2019-11-21 VITALS — SYSTOLIC BLOOD PRESSURE: 117 MMHG | DIASTOLIC BLOOD PRESSURE: 88 MMHG

## 2019-11-21 VITALS — SYSTOLIC BLOOD PRESSURE: 157 MMHG | DIASTOLIC BLOOD PRESSURE: 50 MMHG

## 2019-11-21 VITALS — DIASTOLIC BLOOD PRESSURE: 73 MMHG | SYSTOLIC BLOOD PRESSURE: 121 MMHG

## 2019-11-21 VITALS — DIASTOLIC BLOOD PRESSURE: 70 MMHG | SYSTOLIC BLOOD PRESSURE: 115 MMHG

## 2019-11-21 VITALS — DIASTOLIC BLOOD PRESSURE: 71 MMHG | SYSTOLIC BLOOD PRESSURE: 155 MMHG

## 2019-11-21 LAB
ALBUMIN SERPL-MCNC: 3.3 G/DL (ref 3.4–5)
ALP SERPL-CCNC: 104 U/L (ref 46–116)
ALT SERPL-CCNC: 106 U/L (ref 10–68)
ANION GAP SERPL CALC-SCNC: 22.2 MMOL/L (ref 8–16)
ANION GAP SERPL CALC-SCNC: 26.9 MMOL/L (ref 8–16)
BASOPHILS NFR BLD AUTO: 0 % (ref 0–2)
BASOPHILS NFR BLD AUTO: 0.1 % (ref 0–2)
BILIRUB SERPL-MCNC: 0.81 MG/DL (ref 0.2–1.3)
BUN SERPL-MCNC: 60 MG/DL (ref 7–18)
BUN SERPL-MCNC: 81 MG/DL (ref 7–18)
CALCIUM SERPL-MCNC: 9.3 MG/DL (ref 8.5–10.1)
CALCIUM SERPL-MCNC: 9.5 MG/DL (ref 8.5–10.1)
CHLORIDE SERPL-SCNC: 95 MMOL/L (ref 98–107)
CHLORIDE SERPL-SCNC: 95 MMOL/L (ref 98–107)
CO2 SERPL-SCNC: 22.2 MMOL/L (ref 21–32)
CO2 SERPL-SCNC: 26.4 MMOL/L (ref 21–32)
CREAT SERPL-MCNC: 8 MG/DL (ref 0.6–1.3)
CREAT SERPL-MCNC: 9.2 MG/DL (ref 0.6–1.3)
EOSINOPHIL NFR BLD: 0 % (ref 0–7)
EOSINOPHIL NFR BLD: 0 % (ref 0–7)
ERYTHROCYTE [DISTWIDTH] IN BLOOD BY AUTOMATED COUNT: 15.8 % (ref 11.5–14.5)
ERYTHROCYTE [DISTWIDTH] IN BLOOD BY AUTOMATED COUNT: 16 % (ref 11.5–14.5)
GLOBULIN SER-MCNC: 4 G/L
GLUCOSE SERPL-MCNC: 106 MG/DL (ref 74–106)
GLUCOSE SERPL-MCNC: 115 MG/DL (ref 74–106)
HCT VFR BLD CALC: 42.1 % (ref 42–54)
HCT VFR BLD CALC: 44.5 % (ref 42–54)
HGB BLD-MCNC: 13.7 G/DL (ref 13.5–17.5)
HGB BLD-MCNC: 14.3 G/DL (ref 13.5–17.5)
IMM GRANULOCYTES NFR BLD: 0.4 % (ref 0–5)
IMM GRANULOCYTES NFR BLD: 0.5 % (ref 0–5)
LYMPHOCYTES NFR BLD AUTO: 5.4 % (ref 15–50)
LYMPHOCYTES NFR BLD AUTO: 6.3 % (ref 15–50)
MCH RBC QN AUTO: 30 PG (ref 26–34)
MCH RBC QN AUTO: 30 PG (ref 26–34)
MCHC RBC AUTO-ENTMCNC: 32.1 G/DL (ref 31–37)
MCHC RBC AUTO-ENTMCNC: 32.5 G/DL (ref 31–37)
MCV RBC: 92.3 FL (ref 80–100)
MCV RBC: 93.5 FL (ref 80–100)
MONOCYTES NFR BLD: 10.3 % (ref 2–11)
MONOCYTES NFR BLD: 7.6 % (ref 2–11)
NEUTROPHILS NFR BLD AUTO: 82.8 % (ref 40–80)
NEUTROPHILS NFR BLD AUTO: 86.6 % (ref 40–80)
OSMOLALITY SERPL CALC.SUM OF ELEC: 294 MOSM/KG (ref 275–300)
OSMOLALITY SERPL CALC.SUM OF ELEC: 302 MOSM/KG (ref 275–300)
PA ADP PRP-ACNC: 214 PRU (ref 194–418)
PHOSPHATE SERPL-MCNC: 11.5 MG/DL (ref 2.5–4.9)
PLATELET # BLD: 184 10X3/UL (ref 130–400)
PLATELET # BLD: 208 10X3/UL (ref 130–400)
PMV BLD AUTO: 12 FL (ref 7.4–10.4)
PMV BLD AUTO: 12.2 FL (ref 7.4–10.4)
POTASSIUM SERPL-SCNC: 4.6 MMOL/L (ref 3.5–5.1)
POTASSIUM SERPL-SCNC: 5.1 MMOL/L (ref 3.5–5.1)
PROT SERPL-MCNC: 7.3 G/DL (ref 6.4–8.2)
RBC # BLD AUTO: 4.56 10X6/UL (ref 4.2–6.1)
RBC # BLD AUTO: 4.76 10X6/UL (ref 4.2–6.1)
SODIUM SERPL-SCNC: 139 MMOL/L (ref 136–145)
SODIUM SERPL-SCNC: 139 MMOL/L (ref 136–145)
WBC # BLD AUTO: 11.3 10X3/UL (ref 4.8–10.8)
WBC # BLD AUTO: 13.1 10X3/UL (ref 4.8–10.8)

## 2019-11-21 NOTE — NUR
REASSESSMENT COMPLETE. RESTRAINTS REMAIN OFF. PT LAYING IN BED WITH EYES
CLOSED. NON VERBAL, YET FOLLOWS COMMANDS AT WILL. REPOSITIONED FOR COMFORT.
BED ALARM ON AND CHECKED. PASSING FLATUS. SEE FS FOR FULL ASSESSMENT DETAILS.
SAFETY MEASURES IN PLACE.

## 2019-11-21 NOTE — NUR
PER PTS DAUGHTER, WILL BE HERE AT 1300 TO SPEAK WITH DR TEJEDA AND WILL
UPDATE CALL-IN CODE AND EMERGENCY CONTACT INFORMATION.

## 2019-11-21 NOTE — NUR
RECIEVED CALL FROM WOMEN STATING SHE IS PTS DAUGHTER AND WANTS AN UPDATE.
NOTED THERE IS NO CALL IN CODE ESTABLISHED AND THE NUMBER CALLING WAS NOT THE
NAME OR NUMBER LISTED ON THE EMERGENCY CONTACT LIST. THIS NURSE NOTIFIED
CALLER THAN SINCE THERE IS NOT A CALL IN CODE ESTABLISHED THAT THE ONLY THING
WE CAN TELL HER IS THAT THE PATIENT IS STABLE, BUT IF SHE OR ANOTHER FAMILY
MEMBER WOULD LIKE TO COME UP HERE AND ESTABLISH A CALL-IN CODE THEY WERE
WELCOME TO IN ORDER TO BE ABLE TO RECIEVE INFORMATION IN THE FUTURE.

## 2019-11-21 NOTE — NUR
NOTED PT HAS REFUSED ALL MEALS AND REFUSED ALL PO MEDS TODAY. ORAL CARE
PROVIDED Q2H, PT TURNED Q2H. WHEN ATTEMPTS TO HAVE PT TAKE A DRINK OR A BITE
OF FOOD HE CLOSES MOUTH OR IF HE OPENS HIS MOUTH HE DOES NOT SUCK THE STRAW.
MULTIPLE ATTEMPTS MADE. PHYSICIANS AWARE. WILL CONTINUE TO OFFER PO FOOD AND
DRINKS. VSS. WILL CONTINUE PLAN OF CARE.

## 2019-11-21 NOTE — NUR
Pt observed to be sleeping for short intervals during hrly rounds overnight; OOB to BR to void x3; c/o headache and cough relieved with Tylenol and PRN cough med  Denies pain  UP IN BED AWAKE AT THIS TIME. VSS. NO ACUTE DISTRESS NOTED. PT OPENS EYES,
DOES NOT FOLLOW COMMANDS, AND DOES NOT ANSWER QUESTIONS. PT IS NOTED TO SAY
"HUH" AT TIMES WHEN SPOKEN TO, STAFF ASKED PT IF HE CAN HEAR THEM OR IF THEY
NEED TO SPEAK UP AND PT DID NOT ANSWER. WILL CONTINUE PLAN OF CARE.

## 2019-11-21 NOTE — NUR
SPOKE WITH PTS SISTER WHO STATED PTS DAUGHTER IS RUNNING LATE BUT IS STILL ON
HER WAY HERE TO SEE PT.

## 2019-11-21 NOTE — NUR
PTS FAMILY HAVE NOT ARRIVED HERE YET. CALLED EMERGENCY CONTACY NUMBER TO SEE
WHEN FAMILY WILL ARRIVE, NO ANSWER RECIEVED, NO VOICEMAIL NOTED TO LEAVE
MESSAGE. WILL ATTEMPT TO CALL FAMILY BACK IN A LITTLE BIT. PT OPENS EYES WHEN
SPOKEN TO, DOES NOT ANSWER QUESTIONS, IS MORE ALERT THAN WAS DURING 1400
NOTE. VSS. WILL CONTINUE PLAN OF CARE.

## 2019-11-21 NOTE — NUR
FAMILY AT BEDSIDE. ALL QUESTIONS ANSWERED. PO MEDS NOT GIVEN. PT SHOOK HEAD
"NO" AND CLAMPED MOUTH CLOSED. WILL TRY AGAIN AT A LATER TIME.

## 2019-11-21 NOTE — NUR
PTS FAMILY IS HERE, HAVE SPOKEN WITH DR TEJEDA. ALL QUESTIONS ANSWERED. VSS.
WILL CONTINUE PLAN OF CARE.

## 2019-11-21 NOTE — NUR
ASSESSMENT COMPLETE. SEE FLOWSHEET FOR DETAILS. PT LAYING IN BED WITH EYES
CLOSED. VSS. RESTRAINTS REMOVED AT THIS TIME. WILL MONITOR CLOSELY. SAFETY
MEASURES IN PLACE. CBIR.

## 2019-11-21 NOTE — NUR
AT THIS TIME PT NOTED LESS RESPONSIVE; NOT OPENING EYES WHEN SPOKEN TO AND NO
VERBAL RESPONSE. PT IS NOTED TO PUSH AWAY WHEN ATTEMPTING TO REPOSITION AND
DOES RESPOND TO DISCOMFORT. DR FERMIN PAGED TO NOTIFY FOR FURTHER ORDERS.
BLOOD GLUCOSE . TEMP IS 98.7. TOTAL LINEN CHANGE PROVIDED, FORMED BROWN
INCONTINENT BOWEL MOVEMENT NOTED. ESPERANZA CARE PROVIDED. VSS. WILL CONTINUE TO
CLOSELY OBSERVE.

## 2019-11-22 VITALS — SYSTOLIC BLOOD PRESSURE: 132 MMHG | DIASTOLIC BLOOD PRESSURE: 51 MMHG

## 2019-11-22 VITALS — SYSTOLIC BLOOD PRESSURE: 150 MMHG | DIASTOLIC BLOOD PRESSURE: 93 MMHG

## 2019-11-22 VITALS — SYSTOLIC BLOOD PRESSURE: 104 MMHG | DIASTOLIC BLOOD PRESSURE: 46 MMHG

## 2019-11-22 VITALS — SYSTOLIC BLOOD PRESSURE: 119 MMHG | DIASTOLIC BLOOD PRESSURE: 84 MMHG

## 2019-11-22 VITALS — SYSTOLIC BLOOD PRESSURE: 121 MMHG | DIASTOLIC BLOOD PRESSURE: 72 MMHG

## 2019-11-22 VITALS — DIASTOLIC BLOOD PRESSURE: 51 MMHG | SYSTOLIC BLOOD PRESSURE: 77 MMHG

## 2019-11-22 VITALS — DIASTOLIC BLOOD PRESSURE: 58 MMHG | SYSTOLIC BLOOD PRESSURE: 105 MMHG

## 2019-11-22 VITALS — SYSTOLIC BLOOD PRESSURE: 123 MMHG | DIASTOLIC BLOOD PRESSURE: 83 MMHG

## 2019-11-22 VITALS — DIASTOLIC BLOOD PRESSURE: 78 MMHG | SYSTOLIC BLOOD PRESSURE: 93 MMHG

## 2019-11-22 VITALS — DIASTOLIC BLOOD PRESSURE: 4 MMHG | SYSTOLIC BLOOD PRESSURE: 115 MMHG

## 2019-11-22 VITALS — DIASTOLIC BLOOD PRESSURE: 109 MMHG | SYSTOLIC BLOOD PRESSURE: 152 MMHG

## 2019-11-22 VITALS — DIASTOLIC BLOOD PRESSURE: 77 MMHG | SYSTOLIC BLOOD PRESSURE: 108 MMHG

## 2019-11-22 VITALS — SYSTOLIC BLOOD PRESSURE: 91 MMHG | DIASTOLIC BLOOD PRESSURE: 34 MMHG

## 2019-11-22 VITALS — DIASTOLIC BLOOD PRESSURE: 51 MMHG | SYSTOLIC BLOOD PRESSURE: 166 MMHG

## 2019-11-22 VITALS — SYSTOLIC BLOOD PRESSURE: 118 MMHG | DIASTOLIC BLOOD PRESSURE: 54 MMHG

## 2019-11-22 VITALS — DIASTOLIC BLOOD PRESSURE: 93 MMHG | SYSTOLIC BLOOD PRESSURE: 150 MMHG

## 2019-11-22 VITALS — DIASTOLIC BLOOD PRESSURE: 26 MMHG | SYSTOLIC BLOOD PRESSURE: 98 MMHG

## 2019-11-22 VITALS — DIASTOLIC BLOOD PRESSURE: 77 MMHG | SYSTOLIC BLOOD PRESSURE: 126 MMHG

## 2019-11-22 LAB
ALBUMIN SERPL-MCNC: 3.4 G/DL (ref 3.4–5)
ALP SERPL-CCNC: 118 U/L (ref 46–116)
ALT SERPL-CCNC: 265 U/L (ref 10–68)
ANION GAP SERPL CALC-SCNC: 27 MMOL/L (ref 8–16)
ANION GAP SERPL CALC-SCNC: 32.4 MMOL/L (ref 8–16)
BASOPHILS NFR BLD AUTO: 0 % (ref 0–2)
BASOPHILS NFR BLD AUTO: 0 % (ref 0–2)
BILIRUB SERPL-MCNC: 1.23 MG/DL (ref 0.2–1.3)
BUN SERPL-MCNC: 105 MG/DL (ref 7–18)
BUN SERPL-MCNC: 93 MG/DL (ref 7–18)
CALCIUM SERPL-MCNC: 10 MG/DL (ref 8.5–10.1)
CALCIUM SERPL-MCNC: 9.8 MG/DL (ref 8.5–10.1)
CHLORIDE SERPL-SCNC: 96 MMOL/L (ref 98–107)
CHLORIDE SERPL-SCNC: 96 MMOL/L (ref 98–107)
CO2 SERPL-SCNC: 18.3 MMOL/L (ref 21–32)
CO2 SERPL-SCNC: 20.4 MMOL/L (ref 21–32)
CREAT SERPL-MCNC: 10.3 MG/DL (ref 0.6–1.3)
CREAT SERPL-MCNC: 10.8 MG/DL (ref 0.6–1.3)
EOSINOPHIL NFR BLD: 0 % (ref 0–7)
EOSINOPHIL NFR BLD: 0.2 % (ref 0–7)
ERYTHROCYTE [DISTWIDTH] IN BLOOD BY AUTOMATED COUNT: 16 % (ref 11.5–14.5)
ERYTHROCYTE [DISTWIDTH] IN BLOOD BY AUTOMATED COUNT: 16.2 % (ref 11.5–14.5)
GLOBULIN SER-MCNC: 4.3 G/L
GLUCOSE SERPL-MCNC: 123 MG/DL (ref 74–106)
GLUCOSE SERPL-MCNC: 89 MG/DL (ref 74–106)
HCT VFR BLD CALC: 42.6 % (ref 42–54)
HCT VFR BLD CALC: 48.4 % (ref 42–54)
HGB BLD-MCNC: 13.9 G/DL (ref 13.5–17.5)
HGB BLD-MCNC: 15.6 G/DL (ref 13.5–17.5)
IMM GRANULOCYTES NFR BLD: 0.3 % (ref 0–5)
IMM GRANULOCYTES NFR BLD: 0.6 % (ref 0–5)
LYMPHOCYTES NFR BLD AUTO: 6 % (ref 15–50)
LYMPHOCYTES NFR BLD AUTO: 7.4 % (ref 15–50)
MAGNESIUM SERPL-MCNC: 3.2 MG/DL (ref 1.8–2.4)
MCH RBC QN AUTO: 29.5 PG (ref 26–34)
MCH RBC QN AUTO: 29.7 PG (ref 26–34)
MCHC RBC AUTO-ENTMCNC: 32.2 G/DL (ref 31–37)
MCHC RBC AUTO-ENTMCNC: 32.6 G/DL (ref 31–37)
MCV RBC: 91 FL (ref 80–100)
MCV RBC: 91.5 FL (ref 80–100)
MONOCYTES NFR BLD: 14.6 % (ref 2–11)
MONOCYTES NFR BLD: 6.7 % (ref 2–11)
NEUTROPHILS NFR BLD AUTO: 79.1 % (ref 40–80)
NEUTROPHILS NFR BLD AUTO: 85.1 % (ref 40–80)
OSMOLALITY SERPL CALC.SUM OF ELEC: 308 MOSM/KG (ref 275–300)
OSMOLALITY SERPL CALC.SUM OF ELEC: 309 MOSM/KG (ref 275–300)
PA ADP PRP-ACNC: 174 PRU (ref 194–418)
PHOSPHATE SERPL-MCNC: 15.9 MG/DL (ref 2.5–4.9)
PLATELET # BLD: 196 10X3/UL (ref 130–400)
PLATELET # BLD: 204 10X3/UL (ref 130–400)
PMV BLD AUTO: 12 FL (ref 7.4–10.4)
PMV BLD AUTO: 12.3 FL (ref 7.4–10.4)
POTASSIUM SERPL-SCNC: 5.4 MMOL/L (ref 3.5–5.1)
POTASSIUM SERPL-SCNC: 5.7 MMOL/L (ref 3.5–5.1)
PROT SERPL-MCNC: 7.7 G/DL (ref 6.4–8.2)
RBC # BLD AUTO: 4.68 10X6/UL (ref 4.2–6.1)
RBC # BLD AUTO: 5.29 10X6/UL (ref 4.2–6.1)
SODIUM SERPL-SCNC: 138 MMOL/L (ref 136–145)
SODIUM SERPL-SCNC: 141 MMOL/L (ref 136–145)
TROPONIN I SERPL-MCNC: 27.4 NG/ML (ref 0–0.06)
WBC # BLD AUTO: 11.9 10X3/UL (ref 4.8–10.8)
WBC # BLD AUTO: 14.5 10X3/UL (ref 4.8–10.8)

## 2019-11-22 NOTE — NUR
HERE, NEW ORDER RECIEVED TO OBTAIN EKG NOW. EKG ABNORMAL AND NEW
ORDER RECIEVED TO PAGE ROSETTA SANCHES FOR CARDIAC. ROSETTA SANCHES PAGED AND LEFT VM TO
RETURN CALL. PT PULLING AT LINES AND ETT. NEW ORDER RECIEVED FROM  TO
PLACE SOFT WRIST RESTRAINTS.

## 2019-11-22 NOTE — NUR
DIALYSIS NURSE ASKED PRIMARY NURSE TO COME LOOK AT PT BECAUSE HE WAS
"BREATHING WEIRD" UPON ASSESSMENT PT WAS HAVING AGONAL BREATHING AND THEN
STOPPED BREATHING, HR DROPPED DOWN TO 40 AND WAS TRENDING DOWN.
PRIMARY NURSE YELLED OUT TO SECOND NURSE TO CALL A CODE. PRIMARY NURSE ASKED
DIALYSIS NURSE TO GRAB THE CRASH CART. CODE TEAM ARRIVED. SEE CODE SHEET.

## 2019-11-22 NOTE — NUR
PT ARRIVED TO FLOOR FROM CVICU. PT NOT RESPONDING TO QUESTIONS BUT HAS EYES
OPEN. OVERNIGHT NURSE REPORTS THIS IS NOT ABNORMAL.
BED ALARM ON. CALL LIGHT WITHIN REACH. WILL CONT TO FOLLOW POC

## 2019-11-22 NOTE — NUR
PT RECIEVING DIALYSIS AT BEDSIDE. VSS AND WNL. BED ALARM ON. CALL LIGHT WITHIN
REACH. WILL CONT TO FOLLOW POC

## 2019-11-22 NOTE — MORECARE
CASE MANAGEMENT DISCHARGE SUMMARY
 
 
PATIENT: BROOK CABELLO                       UNIT: [id number]
ACCOUNT#: [id number]                       ADM DATE: 19
AGE: 68     : 46  SEX: M            ROOM/BED: D.2305    
AUTHOR: MARCELLE ASTUDILLO                             PHYSICIAN:                               
 
REFERRING PHYSICIAN: MORIAH FERMIN MD            
DATE OF SERVICE: 19
Discharge Plan
 
 
Patient Name: BROOK CABELLO
Facility: [facility]:[place]
Encounter #: [id number]
Medical Record #: [id number]
: 1950
Planned Disposition: 
Anticipated Discharge Date: 
 
Discharge Date: 
Expected LOS: 
Initial Reviewer: [email or web address]
Initial Review Date: 2019
Generated: 19   5:40 pm 
  
 
 
 
 
 
 
 
Patient Name: BROOK CABELLO
 
Encounter #: [id number]
Page [id number]
 
 
 
 
 
Electronically Signed by MARCELLE ASTUDILLO on 19 at 1640
 
 
 
 
 
 
**All edits/amendments must be made on the electronic document**
 
DICTATION DATE: 19 1640     : CANDIE  19 [facility]     
RPT#: [id number]                                DC DATE:        
                                               STATUS: ADM IN  
[facility]
 [place] [address]
***END OF REPORT***

## 2019-11-22 NOTE — NUR
NUTRITION F/U
PT NPO FOR SURGERY TODAY. WILL PROVIDE DIET WHEN RESUMED. ASSIST WITH
NUTRITION SUPPORT IF NEEDED.
RD FOLLOWING

## 2019-11-22 NOTE — NUR
UNABLE TO OBTAIN BP ON PT. ASKED SECOND NURSE TO ASSIST. LEVOPHED RESTARTED AT
5MCG. PAGED  AND NEW ORDERS RECIEVED TO GIVE 500ML NS BOLUS AND PAGE
CARDIAC.  PAGED AND NEW ORDERS RECIEVED TO START DOBUTAMINE GTT,
AMIODARONE GTT AND BOLUS, AND GIVE 2 AMP OF CA CHLORIDE. PLACED ORDERS INTO
COMPUTER AND STARTING GRABBING SUPPLIES. AT 1425 PT WENT INTO V-FIB. CODE BLUE
CALLED. AND  TOOK OVER WITH ORDERS AT THIS POINT. SEE CODE SHEET.
 ENDED CODE AT 1455 AND PRONOUNCED DEATH AT 1455.

## 2019-11-22 NOTE — MORECARE
CASE MANAGEMENT DISCHARGE SUMMARY
 
 
PATIENT: BROOK CABELLO                       UNIT: [id number]
ACCOUNT#: [id number]                       ADM DATE: 19
AGE: 68     : 46  SEX: M            ROOM/BED: D.2305    
AUTHOR: MARCELLE ASTUDILLO                             PHYSICIAN:                               
 
REFERRING PHYSICIAN: MORIAH FERMIN MD            
DATE OF SERVICE: 19
Discharge Plan
 
 
Patient Name: BROOK CABELLO
[facility]
Encounter #: [id number]
Medical Record #: [id number]
: 1950
Planned Disposition: 
Anticipated Discharge Date: 
 
Discharge Date: 
Expected LOS: 
Initial Reviewer: [id number]
Initial Review Date: 2019
Generated: 19   6:01 pm 
Comments
 
DCP- Discharge Planning
 
Updated by [email or web address]: Shawna Baugh on 19   3:58 pm CT
Late Entry  19    
  
Patient confused unable to get discharge planning assessment completed. No 
answer on phone number listed in chart. CM will continue to follow and assist 
as needed with discharge planning / needs.  
  
  
Late Entry  19  
  
Nursing stated that family is suppose to be here around 1 pm.  CM will meet 
with them when they arrive for discharge planning.  19 @ 1630 family 
still hasn't arrived.  CM will continue to follow and assist as needed with 
discharge planning / needs.
  
 
 
 
 
 
 
 
 
 
Last DP export: 19   3:40 
Patient Name: BROOK CABELLO
Encounter #: [id number]
Page [id number]
 
 
 
 
 
Electronically Signed by MARCELLE ASTUDILLO on 19 at 1701
 
 
 
 
 
 
**All edits/amendments must be made on the electronic document**
 
DICTATION DATE: 19     : CANDIE  19     
RPT#: [id number]                                DC DATE:        
                                               STATUS: ADM IN  
[facility]
[address] [place] [address]
***END OF REPORT***

## 2019-11-22 NOTE — NUR
REASSESSMENT COMPLETE. SEE FLOWSHEET FOR DETAILS. NO NEW CHANGES NOTED IN PT
CONDITION. LAYING IN BED WITH EYES CLOSED SHOWING NO SS OF DISTRESS. VSS.
SAFETY MEASURES IN PLACE. CBIR.

## 2019-11-22 NOTE — NUR
REPOSITIONED FOR COMFORT. VSS. ORAL CARE PROVIDED. RESTRAINTS REMAIN OFF. OT
CONTINUES TO FOLLOW COMMANDS/PROMPTS AT WILL. BED ALARM ON. SAFETY MEASURES IN
PLACE. CBIR.

## 2019-11-25 NOTE — OP
PATIENT NAME:  BROOK CABELLO                             MEDICAL RECORD: [id number]
:46                                             LOCATION:D[facility]    D.2305
                                                         ADMISSION DATE:19
SURGEON:  MARIA ESTHER STERN MD             
 
 
DATE OF OPERATION:  2019
 
DATE OF SERVICE:  2019
 
PROCEDURES:
1.  Left heart catheterization.
2.  Selective coronary angiography.
3.  Left ventriculogram.
4.  Vein graft angiography.
5.  LIMA angiography.
6.  IFR.
 
INDICATION:  Non-Q-wave myocardial infarction and coronary artery disease, CABG,
previous multivessel percutaneous transluminal coronary angioplasty stent.
 
PROCEDURE IN DETAIL:  After informed consent was obtained and after a detailed
description of risks, benefits as well as alternative therapies, the patient
elected to proceed with angiogram and heart catheterization.  The right femoral
area was prepped and draped in normal sterile fashion.  Right femoral artery was
cannulated via modified Seldinger technique with placement of 6-Canadian sheath. 
All catheters exchanged through this sheath.
 
FINDINGS:  Left ventriculogram was performed in the standard 30-degree MORE view
reveals good cardiac wall motion, ejection fraction 50%.
 
SELECTIVE CORONARY ANGIOGRAPHY:
1.  Left main is closed.
2.  LIMA to the LAD is widely patent.
3.  Vein graft to circumflex is widely patent, previously placed stents appear
to be widely patent.  There is questionable area of stenosis after the distal
stent; however, IFR was normal at 0.91.
4.  The right coronary artery has mild irregularities, but no flow-limiting
stenosis.
 
OVERALL IMPRESSION:  Wide patency of the previously placed stents, wide patency
of the left internal mammary artery graft.  Continue medical management of the
coronary artery disease and cardiac risk factors.
 
TRANSINT:[id number] Voice Confirmation ID: [id number] DOCUMENT ID: [id number]
                                           
                                           MARIA ESTHER STERN MD             
 
 
 
Electronically Signed by MARIA ESTHER STERN on 19 at 1702
CC:                                                             [id number]
DICTATION DATE: 19 1136     :     19 1151      DIS IN  
                                                                      19
[place] [id number] [place] [phone]

## 2022-10-26 NOTE — NUR
REPORT RECEIVED. HE WAS ASLEEP BUT AROUSED AND SPOKE. HE IS ALERT DENIES ANY
CURRENT NEEDS. RIGHT HAND SALINE LOCK INTACT. RESP EVEN WITHOUT LABOR BED IN
LOWEST POSITION AND LOCKED. CAREPLAN REVIEW DONE WITH SAFETY PRECAUTIONS IN
PLACE. CL IN REACH. DENIES ANY CURRENT C/P no known mental health issues.

## 2023-12-25 NOTE — NUR
RECEIVED PT IN BED SITTING ON SIDE AAOX4 RESP UNLABORED SKIN W/D COLOR WNL
DRSG C/D/I TO [facility] NOTED WITH GOOD THRILL AND BRUIT PT C/O OF SOME
INDIGESTION AT THIE TIME Patient/Other Family Member